# Patient Record
Sex: FEMALE | Race: WHITE
[De-identification: names, ages, dates, MRNs, and addresses within clinical notes are randomized per-mention and may not be internally consistent; named-entity substitution may affect disease eponyms.]

---

## 2017-07-21 ENCOUNTER — HOSPITAL ENCOUNTER (EMERGENCY)
Dept: HOSPITAL 8 - ED | Age: 42
Discharge: HOME | End: 2017-07-21
Payer: COMMERCIAL

## 2017-07-21 VITALS — SYSTOLIC BLOOD PRESSURE: 120 MMHG | DIASTOLIC BLOOD PRESSURE: 80 MMHG

## 2017-07-21 VITALS — BODY MASS INDEX: 30.47 KG/M2 | WEIGHT: 155.21 LBS | HEIGHT: 60 IN
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
## 2017-07-21 DIAGNOSIS — S33.5XXA: Primary | ICD-10-CM
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
## 2017-07-21 DIAGNOSIS — Z88.8: ICD-10-CM
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
## 2017-07-21 PROCEDURE — 72110 X-RAY EXAM L-2 SPINE 4/>VWS: CPT

## 2017-07-21 PROCEDURE — 99284 EMERGENCY DEPT VISIT MOD MDM: CPT

## 2017-07-21 PROCEDURE — 96372 THER/PROPH/DIAG INJ SC/IM: CPT

## 2017-07-21 PROCEDURE — 72072 X-RAY EXAM THORAC SPINE 3VWS: CPT

## 2017-12-12 ENCOUNTER — HOSPITAL ENCOUNTER (EMERGENCY)
Dept: HOSPITAL 8 - ED | Age: 42
Discharge: HOME | End: 2017-12-12
Payer: COMMERCIAL

## 2017-12-12 VITALS — DIASTOLIC BLOOD PRESSURE: 87 MMHG | SYSTOLIC BLOOD PRESSURE: 125 MMHG

## 2017-12-12 VITALS — BODY MASS INDEX: 25.22 KG/M2 | HEIGHT: 64 IN | WEIGHT: 147.71 LBS

## 2017-12-12 DIAGNOSIS — K21.9: Primary | ICD-10-CM

## 2017-12-12 LAB
BUN SERPL-MCNC: 12 MG/DL (ref 7–18)
HCT VFR BLD CALC: 43.4 % (ref 34.6–47.8)
HGB BLD-MCNC: 14.5 G/DL (ref 11.7–16.4)
WBC # BLD AUTO: 8.1 X10^3/UL (ref 3.4–10)

## 2017-12-12 PROCEDURE — 80048 BASIC METABOLIC PNL TOTAL CA: CPT

## 2017-12-12 PROCEDURE — 82040 ASSAY OF SERUM ALBUMIN: CPT

## 2017-12-12 PROCEDURE — 36415 COLL VENOUS BLD VENIPUNCTURE: CPT

## 2017-12-12 PROCEDURE — 87086 URINE CULTURE/COLONY COUNT: CPT

## 2017-12-12 PROCEDURE — 96376 TX/PRO/DX INJ SAME DRUG ADON: CPT

## 2017-12-12 PROCEDURE — 81001 URINALYSIS AUTO W/SCOPE: CPT

## 2017-12-12 PROCEDURE — 74176 CT ABD & PELVIS W/O CONTRAST: CPT

## 2017-12-12 PROCEDURE — 85025 COMPLETE CBC W/AUTO DIFF WBC: CPT

## 2017-12-12 PROCEDURE — 96374 THER/PROPH/DIAG INJ IV PUSH: CPT

## 2017-12-12 PROCEDURE — 96375 TX/PRO/DX INJ NEW DRUG ADDON: CPT

## 2017-12-12 PROCEDURE — 99285 EMERGENCY DEPT VISIT HI MDM: CPT

## 2017-12-12 RX ADMIN — MORPHINE SULFATE PRN MG: 4 INJECTION INTRAVENOUS at 16:53

## 2017-12-12 RX ADMIN — MORPHINE SULFATE PRN MG: 4 INJECTION INTRAVENOUS at 17:53

## 2020-01-18 ENCOUNTER — HOSPITAL ENCOUNTER (EMERGENCY)
Dept: HOSPITAL 8 - ED | Age: 45
Discharge: HOME | End: 2020-01-18
Payer: COMMERCIAL

## 2020-01-18 VITALS — BODY MASS INDEX: 28.98 KG/M2 | HEIGHT: 64 IN | WEIGHT: 169.76 LBS

## 2020-01-18 VITALS — SYSTOLIC BLOOD PRESSURE: 101 MMHG | DIASTOLIC BLOOD PRESSURE: 63 MMHG

## 2020-01-18 DIAGNOSIS — Z90.49: ICD-10-CM

## 2020-01-18 DIAGNOSIS — Z90.710: ICD-10-CM

## 2020-01-18 DIAGNOSIS — Z87.891: ICD-10-CM

## 2020-01-18 DIAGNOSIS — K21.9: ICD-10-CM

## 2020-01-18 DIAGNOSIS — G43.009: Primary | ICD-10-CM

## 2020-01-18 DIAGNOSIS — N10: ICD-10-CM

## 2020-01-18 LAB
ALBUMIN SERPL-MCNC: 4.4 G/DL (ref 3.4–5)
ALP SERPL-CCNC: 112 U/L (ref 45–117)
ALT SERPL-CCNC: 43 U/L (ref 12–78)
ANION GAP SERPL CALC-SCNC: 9 MMOL/L (ref 5–15)
BASOPHILS # BLD AUTO: 0.09 X10^3/UL (ref 0–0.1)
BASOPHILS NFR BLD AUTO: 1 % (ref 0–1)
BILIRUB SERPL-MCNC: 0.5 MG/DL (ref 0.2–1)
CALCIUM SERPL-MCNC: 9.3 MG/DL (ref 8.5–10.1)
CHLORIDE SERPL-SCNC: 113 MMOL/L (ref 98–107)
CREAT SERPL-MCNC: 1.19 MG/DL (ref 0.55–1.02)
CULTURE INDICATED?: YES
EOSINOPHIL # BLD AUTO: 0.1 X10^3/UL (ref 0–0.4)
EOSINOPHIL NFR BLD AUTO: 1 % (ref 1–7)
ERYTHROCYTE [DISTWIDTH] IN BLOOD BY AUTOMATED COUNT: 12.7 % (ref 9.6–15.2)
HCG UR SG: 1.02 (ref 1–1.03)
LYMPHOCYTES # BLD AUTO: 1.46 X10^3/UL (ref 1–3.4)
LYMPHOCYTES NFR BLD AUTO: 15 % (ref 22–44)
MCH RBC QN AUTO: 31.3 PG (ref 27–34.8)
MCHC RBC AUTO-ENTMCNC: 33.2 G/DL (ref 32.4–35.8)
MCV RBC AUTO: 94.2 FL (ref 80–100)
MD: NO
MICROSCOPIC: (no result)
MONOCYTES # BLD AUTO: 0.41 X10^3/UL (ref 0.2–0.8)
MONOCYTES NFR BLD AUTO: 4 % (ref 2–9)
NEUTROPHILS # BLD AUTO: 7.94 X10^3/UL (ref 1.8–6.8)
NEUTROPHILS NFR BLD AUTO: 80 % (ref 42–75)
PLATELET # BLD AUTO: 380 X10^3/UL (ref 130–400)
PMV BLD AUTO: 7.8 FL (ref 7.4–10.4)
PROT SERPL-MCNC: 7.6 G/DL (ref 6.4–8.2)
RBC # BLD AUTO: 4.52 X10^6/UL (ref 3.82–5.3)

## 2020-01-18 PROCEDURE — 81001 URINALYSIS AUTO W/SCOPE: CPT

## 2020-01-18 PROCEDURE — 96365 THER/PROPH/DIAG IV INF INIT: CPT

## 2020-01-18 PROCEDURE — 36415 COLL VENOUS BLD VENIPUNCTURE: CPT

## 2020-01-18 PROCEDURE — 96375 TX/PRO/DX INJ NEW DRUG ADDON: CPT

## 2020-01-18 PROCEDURE — 83690 ASSAY OF LIPASE: CPT

## 2020-01-18 PROCEDURE — 87086 URINE CULTURE/COLONY COUNT: CPT

## 2020-01-18 PROCEDURE — 80053 COMPREHEN METABOLIC PANEL: CPT

## 2020-01-18 PROCEDURE — 96361 HYDRATE IV INFUSION ADD-ON: CPT

## 2020-01-18 PROCEDURE — 96376 TX/PRO/DX INJ SAME DRUG ADON: CPT

## 2020-01-18 PROCEDURE — 85025 COMPLETE CBC W/AUTO DIFF WBC: CPT

## 2020-01-18 PROCEDURE — 81025 URINE PREGNANCY TEST: CPT

## 2020-01-18 PROCEDURE — 99283 EMERGENCY DEPT VISIT LOW MDM: CPT

## 2020-01-18 NOTE — NUR
ROCEPHIN ROSEMARY, INFUSING AT 100ML/HR VIA PUMP.  IV SITE PATENT.  PT LYING 
QUIETLY ON BED.  REPORTS SLIGHT IMPROVEMENT IN HA PAIN, BUT CONTINUING NAUSEA; 
REQUESTED ADDITIONAL ZOFRAN; ERP WILL BE CONSULTED.  SIDE RAILS UP X2, CALL 
LIGHT W/IN REACH, ROOM LIGHTS DIMMED. PT'S DAUGHTER IN ROOM.

## 2020-01-21 ENCOUNTER — HOSPITAL ENCOUNTER (EMERGENCY)
Dept: HOSPITAL 8 - ED | Age: 45
Discharge: HOME | End: 2020-01-21
Payer: COMMERCIAL

## 2020-01-21 VITALS — WEIGHT: 171.3 LBS | BODY MASS INDEX: 29.24 KG/M2 | HEIGHT: 64 IN

## 2020-01-21 VITALS — DIASTOLIC BLOOD PRESSURE: 80 MMHG | SYSTOLIC BLOOD PRESSURE: 118 MMHG

## 2020-01-21 DIAGNOSIS — Z90.710: ICD-10-CM

## 2020-01-21 DIAGNOSIS — Z90.49: ICD-10-CM

## 2020-01-21 DIAGNOSIS — R10.9: Primary | ICD-10-CM

## 2020-01-21 DIAGNOSIS — Z88.8: ICD-10-CM

## 2020-01-21 LAB
ALBUMIN SERPL-MCNC: 4.5 G/DL (ref 3.4–5)
ALP SERPL-CCNC: 118 U/L (ref 45–117)
ALT SERPL-CCNC: 39 U/L (ref 12–78)
ANION GAP SERPL CALC-SCNC: 7 MMOL/L (ref 5–15)
BASOPHILS # BLD AUTO: 0.07 X10^3/UL (ref 0–0.1)
BASOPHILS NFR BLD AUTO: 1 % (ref 0–1)
BILIRUB SERPL-MCNC: 0.5 MG/DL (ref 0.2–1)
CALCIUM SERPL-MCNC: 9.7 MG/DL (ref 8.5–10.1)
CHLORIDE SERPL-SCNC: 112 MMOL/L (ref 98–107)
CREAT SERPL-MCNC: 1.38 MG/DL (ref 0.55–1.02)
CULTURE INDICATED?: NO
EOSINOPHIL # BLD AUTO: 0.12 X10^3/UL (ref 0–0.4)
EOSINOPHIL NFR BLD AUTO: 2 % (ref 1–7)
ERYTHROCYTE [DISTWIDTH] IN BLOOD BY AUTOMATED COUNT: 12.7 % (ref 9.6–15.2)
LYMPHOCYTES # BLD AUTO: 1.7 X10^3/UL (ref 1–3.4)
LYMPHOCYTES NFR BLD AUTO: 29 % (ref 22–44)
MCH RBC QN AUTO: 31.6 PG (ref 27–34.8)
MCHC RBC AUTO-ENTMCNC: 33.4 G/DL (ref 32.4–35.8)
MCV RBC AUTO: 94.7 FL (ref 80–100)
MD: NO
MICROSCOPIC: (no result)
MONOCYTES # BLD AUTO: 0.3 X10^3/UL (ref 0.2–0.8)
MONOCYTES NFR BLD AUTO: 5 % (ref 2–9)
NEUTROPHILS # BLD AUTO: 3.61 X10^3/UL (ref 1.8–6.8)
NEUTROPHILS NFR BLD AUTO: 62 % (ref 42–75)
PLATELET # BLD AUTO: 384 X10^3/UL (ref 130–400)
PMV BLD AUTO: 7.4 FL (ref 7.4–10.4)
PROT SERPL-MCNC: 7.9 G/DL (ref 6.4–8.2)
RBC # BLD AUTO: 4.86 X10^6/UL (ref 3.82–5.3)

## 2020-01-21 PROCEDURE — 83605 ASSAY OF LACTIC ACID: CPT

## 2020-01-21 PROCEDURE — 99284 EMERGENCY DEPT VISIT MOD MDM: CPT

## 2020-01-21 PROCEDURE — 80053 COMPREHEN METABOLIC PANEL: CPT

## 2020-01-21 PROCEDURE — 85025 COMPLETE CBC W/AUTO DIFF WBC: CPT

## 2020-01-21 PROCEDURE — 87040 BLOOD CULTURE FOR BACTERIA: CPT

## 2020-01-21 PROCEDURE — 36415 COLL VENOUS BLD VENIPUNCTURE: CPT

## 2020-01-21 PROCEDURE — 96372 THER/PROPH/DIAG INJ SC/IM: CPT

## 2020-01-21 PROCEDURE — 81003 URINALYSIS AUTO W/O SCOPE: CPT

## 2020-01-21 PROCEDURE — 74176 CT ABD & PELVIS W/O CONTRAST: CPT

## 2020-01-21 NOTE — NUR
BREAK RN: THIS IS A 43 YO FEMALE WHO PRESENTS TO THE ER C/O RIGHT FLANK PAIN 
RADIATING DOWN RIGHT LEG WITH GENERAL BODY ACHES. PT REPORTS NAUSEA BUT DENIES 
VOMITING OR DIARRHEA. PT AO X 4. SKIN PWD. RESP EVEN AND UNLABORED. PT ON CONT 
BP AND O2 MONITORS. CALL LIGHT WITHIN REACH. WILL CONT TO MONITOR PT.

## 2020-03-18 ENCOUNTER — HOSPITAL ENCOUNTER (OUTPATIENT)
Dept: LAB | Facility: MEDICAL CENTER | Age: 45
End: 2020-03-18
Attending: FAMILY MEDICINE
Payer: COMMERCIAL

## 2020-03-18 LAB
25(OH)D3 SERPL-MCNC: 47 NG/ML (ref 30–100)
ALBUMIN SERPL BCP-MCNC: 4.5 G/DL (ref 3.2–4.9)
ALBUMIN/GLOB SERPL: 2 G/DL
ALP SERPL-CCNC: 91 U/L (ref 30–99)
ALT SERPL-CCNC: 40 U/L (ref 2–50)
ANION GAP SERPL CALC-SCNC: 9 MMOL/L (ref 7–16)
AST SERPL-CCNC: 26 U/L (ref 12–45)
BASOPHILS # BLD AUTO: 1 % (ref 0–1.8)
BASOPHILS # BLD: 0.05 K/UL (ref 0–0.12)
BILIRUB SERPL-MCNC: <0.2 MG/DL (ref 0.1–1.5)
BUN SERPL-MCNC: 16 MG/DL (ref 8–22)
CALCIUM SERPL-MCNC: 9.5 MG/DL (ref 8.5–10.5)
CHLORIDE SERPL-SCNC: 111 MMOL/L (ref 96–112)
CO2 SERPL-SCNC: 19 MMOL/L (ref 20–33)
CREAT SERPL-MCNC: 1 MG/DL (ref 0.5–1.4)
EOSINOPHIL # BLD AUTO: 0.08 K/UL (ref 0–0.51)
EOSINOPHIL NFR BLD: 1.5 % (ref 0–6.9)
ERYTHROCYTE [DISTWIDTH] IN BLOOD BY AUTOMATED COUNT: 44.2 FL (ref 35.9–50)
EST. AVERAGE GLUCOSE BLD GHB EST-MCNC: 103 MG/DL
ESTRADIOL SERPL-MCNC: 55.9 PG/ML
FASTING STATUS PATIENT QL REPORTED: NORMAL
GLOBULIN SER CALC-MCNC: 2.3 G/DL (ref 1.9–3.5)
GLUCOSE SERPL-MCNC: 97 MG/DL (ref 65–99)
HBA1C MFR BLD: 5.2 % (ref 0–5.6)
HCT VFR BLD AUTO: 40.9 % (ref 37–47)
HCYS SERPL-SCNC: 10.88 UMOL/L
HGB BLD-MCNC: 13.3 G/DL (ref 12–16)
IMM GRANULOCYTES # BLD AUTO: 0.02 K/UL (ref 0–0.11)
IMM GRANULOCYTES NFR BLD AUTO: 0.4 % (ref 0–0.9)
LYMPHOCYTES # BLD AUTO: 1.29 K/UL (ref 1–4.8)
LYMPHOCYTES NFR BLD: 24.9 % (ref 22–41)
MCH RBC QN AUTO: 32 PG (ref 27–33)
MCHC RBC AUTO-ENTMCNC: 32.5 G/DL (ref 33.6–35)
MCV RBC AUTO: 98.6 FL (ref 81.4–97.8)
MONOCYTES # BLD AUTO: 0.28 K/UL (ref 0–0.85)
MONOCYTES NFR BLD AUTO: 5.4 % (ref 0–13.4)
NEUTROPHILS # BLD AUTO: 3.47 K/UL (ref 2–7.15)
NEUTROPHILS NFR BLD: 66.8 % (ref 44–72)
NRBC # BLD AUTO: 0 K/UL
NRBC BLD-RTO: 0 /100 WBC
PLATELET # BLD AUTO: 335 K/UL (ref 164–446)
PMV BLD AUTO: 9.4 FL (ref 9–12.9)
POTASSIUM SERPL-SCNC: 3.9 MMOL/L (ref 3.6–5.5)
PROGEST SERPL-MCNC: 0.07 NG/ML
PROT SERPL-MCNC: 6.8 G/DL (ref 6–8.2)
RBC # BLD AUTO: 4.15 M/UL (ref 4.2–5.4)
SODIUM SERPL-SCNC: 139 MMOL/L (ref 135–145)
T3FREE SERPL-MCNC: 2.53 PG/ML (ref 2.4–4.2)
T4 FREE SERPL-MCNC: 0.88 NG/DL (ref 0.53–1.43)
THYROPEROXIDASE AB SERPL-ACNC: <9 IU/ML (ref 0–9)
TSH SERPL DL<=0.005 MIU/L-ACNC: 1.38 UIU/ML (ref 0.38–5.33)
WBC # BLD AUTO: 5.2 K/UL (ref 4.8–10.8)

## 2020-03-18 PROCEDURE — 83036 HEMOGLOBIN GLYCOSYLATED A1C: CPT

## 2020-03-18 PROCEDURE — 84403 ASSAY OF TOTAL TESTOSTERONE: CPT

## 2020-03-18 PROCEDURE — 82306 VITAMIN D 25 HYDROXY: CPT

## 2020-03-18 PROCEDURE — 36415 COLL VENOUS BLD VENIPUNCTURE: CPT

## 2020-03-18 PROCEDURE — 84402 ASSAY OF FREE TESTOSTERONE: CPT

## 2020-03-18 PROCEDURE — 84481 FREE ASSAY (FT-3): CPT

## 2020-03-18 PROCEDURE — 82670 ASSAY OF TOTAL ESTRADIOL: CPT

## 2020-03-18 PROCEDURE — 84144 ASSAY OF PROGESTERONE: CPT

## 2020-03-18 PROCEDURE — 84482 T3 REVERSE: CPT

## 2020-03-18 PROCEDURE — 84270 ASSAY OF SEX HORMONE GLOBUL: CPT

## 2020-03-18 PROCEDURE — 86376 MICROSOMAL ANTIBODY EACH: CPT

## 2020-03-18 PROCEDURE — 80053 COMPREHEN METABOLIC PANEL: CPT

## 2020-03-18 PROCEDURE — 82679 ASSAY OF ESTRONE: CPT

## 2020-03-18 PROCEDURE — 86800 THYROGLOBULIN ANTIBODY: CPT

## 2020-03-18 PROCEDURE — 85025 COMPLETE CBC W/AUTO DIFF WBC: CPT

## 2020-03-18 PROCEDURE — 84439 ASSAY OF FREE THYROXINE: CPT

## 2020-03-18 PROCEDURE — 83525 ASSAY OF INSULIN: CPT

## 2020-03-18 PROCEDURE — 83090 ASSAY OF HOMOCYSTEINE: CPT

## 2020-03-18 PROCEDURE — 84443 ASSAY THYROID STIM HORMONE: CPT

## 2020-03-20 LAB
ESTRONE SERPL-MCNC: 10.8 PG/ML
INSULIN P FAST SERPL-ACNC: 15 UIU/ML (ref 3–19)
THYROGLOB AB SERPL-ACNC: <0.9 IU/ML (ref 0–4)

## 2020-03-21 LAB
SHBG SERPL-SCNC: 20 NMOL/L (ref 30–135)
T3REVERSE SERPL-MCNC: 10.5 NG/DL (ref 9–27)
TESTOST FREE SERPL-MCNC: 26.4 PG/ML (ref 1.1–5.8)
TESTOST SERPL-MCNC: 122 NG/DL (ref 9–55)

## 2020-07-14 ENCOUNTER — APPOINTMENT (RX ONLY)
Dept: URBAN - METROPOLITAN AREA CLINIC 4 | Facility: CLINIC | Age: 45
Setting detail: DERMATOLOGY
End: 2020-07-14

## 2020-07-14 DIAGNOSIS — L81.4 OTHER MELANIN HYPERPIGMENTATION: ICD-10-CM

## 2020-07-14 DIAGNOSIS — L82.1 OTHER SEBORRHEIC KERATOSIS: ICD-10-CM

## 2020-07-14 DIAGNOSIS — D22 MELANOCYTIC NEVI: ICD-10-CM

## 2020-07-14 DIAGNOSIS — Q819 OTHER SPECIFIED ANOMALIES OF SKIN: ICD-10-CM

## 2020-07-14 DIAGNOSIS — Z71.89 OTHER SPECIFIED COUNSELING: ICD-10-CM

## 2020-07-14 DIAGNOSIS — Q826 OTHER SPECIFIED ANOMALIES OF SKIN: ICD-10-CM

## 2020-07-14 DIAGNOSIS — D18.0 HEMANGIOMA: ICD-10-CM

## 2020-07-14 DIAGNOSIS — Q828 OTHER SPECIFIED ANOMALIES OF SKIN: ICD-10-CM

## 2020-07-14 PROBLEM — D22.61 MELANOCYTIC NEVI OF RIGHT UPPER LIMB, INCLUDING SHOULDER: Status: ACTIVE | Noted: 2020-07-14

## 2020-07-14 PROBLEM — D22.5 MELANOCYTIC NEVI OF TRUNK: Status: ACTIVE | Noted: 2020-07-14

## 2020-07-14 PROBLEM — D22.62 MELANOCYTIC NEVI OF LEFT UPPER LIMB, INCLUDING SHOULDER: Status: ACTIVE | Noted: 2020-07-14

## 2020-07-14 PROBLEM — D18.01 HEMANGIOMA OF SKIN AND SUBCUTANEOUS TISSUE: Status: ACTIVE | Noted: 2020-07-14

## 2020-07-14 PROBLEM — D22.39 MELANOCYTIC NEVI OF OTHER PARTS OF FACE: Status: ACTIVE | Noted: 2020-07-14

## 2020-07-14 PROBLEM — L85.8 OTHER SPECIFIED EPIDERMAL THICKENING: Status: ACTIVE | Noted: 2020-07-14

## 2020-07-14 PROBLEM — D23.39 OTHER BENIGN NEOPLASM OF SKIN OF OTHER PARTS OF FACE: Status: ACTIVE | Noted: 2020-07-14

## 2020-07-14 PROCEDURE — ? DIAGNOSIS COMMENT

## 2020-07-14 PROCEDURE — 99203 OFFICE O/P NEW LOW 30 MIN: CPT

## 2020-07-14 PROCEDURE — ? OBSERVATION

## 2020-07-14 PROCEDURE — ? COUNSELING

## 2020-07-14 PROCEDURE — ? ADDITIONAL NOTES

## 2020-07-14 ASSESSMENT — LOCATION DETAILED DESCRIPTION DERM
LOCATION DETAILED: LEFT ANTERIOR PROXIMAL UPPER ARM
LOCATION DETAILED: RIGHT PROXIMAL POSTERIOR UPPER ARM
LOCATION DETAILED: RIGHT MID-UPPER BACK
LOCATION DETAILED: RIGHT SUPERIOR FOREHEAD
LOCATION DETAILED: LEFT PERIAREOLAR BREAST 12-1:00 REGION
LOCATION DETAILED: EPIGASTRIC SKIN
LOCATION DETAILED: SUPERIOR THORACIC SPINE
LOCATION DETAILED: RIGHT ANTERIOR PROXIMAL UPPER ARM
LOCATION DETAILED: LEFT PROXIMAL POSTERIOR UPPER ARM
LOCATION DETAILED: RIGHT VENTRAL DISTAL FOREARM
LOCATION DETAILED: LEFT MEDIAL UPPER BACK
LOCATION DETAILED: RIGHT ANTERIOR DISTAL UPPER ARM
LOCATION DETAILED: RIGHT ANTERIOR SHOULDER
LOCATION DETAILED: LOWER STERNUM
LOCATION DETAILED: LEFT SUPERIOR MEDIAL MIDBACK
LOCATION DETAILED: RIGHT LATERAL SUPERIOR CHEST
LOCATION DETAILED: LEFT INFERIOR MEDIAL FOREHEAD
LOCATION DETAILED: LEFT SUPERIOR FOREHEAD
LOCATION DETAILED: LEFT VENTRAL PROXIMAL FOREARM
LOCATION DETAILED: RIGHT PERIAREOLAR BREAST 11-12:00 REGION

## 2020-07-14 ASSESSMENT — LOCATION SIMPLE DESCRIPTION DERM
LOCATION SIMPLE: LEFT BREAST
LOCATION SIMPLE: LEFT UPPER ARM
LOCATION SIMPLE: RIGHT SHOULDER
LOCATION SIMPLE: LEFT UPPER BACK
LOCATION SIMPLE: RIGHT POSTERIOR UPPER ARM
LOCATION SIMPLE: LEFT POSTERIOR UPPER ARM
LOCATION SIMPLE: RIGHT UPPER ARM
LOCATION SIMPLE: CHEST
LOCATION SIMPLE: RIGHT BREAST
LOCATION SIMPLE: RIGHT UPPER BACK
LOCATION SIMPLE: UPPER BACK
LOCATION SIMPLE: LEFT LOWER BACK
LOCATION SIMPLE: RIGHT FOREARM
LOCATION SIMPLE: LEFT FOREARM
LOCATION SIMPLE: ABDOMEN
LOCATION SIMPLE: LEFT FOREHEAD
LOCATION SIMPLE: RIGHT FOREHEAD

## 2020-07-14 ASSESSMENT — LOCATION ZONE DERM
LOCATION ZONE: ARM
LOCATION ZONE: FACE
LOCATION ZONE: TRUNK

## 2020-07-14 NOTE — PROCEDURE: ADDITIONAL NOTES
Detail Level: Detailed
Additional Notes: Recommended CeraVe SA, amlactin that can help with the bumps.

## 2020-07-14 NOTE — PROCEDURE: DIAGNOSIS COMMENT
Comment: Lesions of concerns on intake clinically consistent with Seborrheic keratoses.
Detail Level: Detailed
Comment: Photos obtained with measurement, will monitor.

## 2020-09-04 ENCOUNTER — HOSPITAL ENCOUNTER (OUTPATIENT)
Dept: LAB | Facility: MEDICAL CENTER | Age: 45
End: 2020-09-04
Attending: FAMILY MEDICINE
Payer: COMMERCIAL

## 2020-09-04 LAB
25(OH)D3 SERPL-MCNC: 64 NG/ML (ref 30–100)
ALBUMIN SERPL BCP-MCNC: 5 G/DL (ref 3.2–4.9)
ALBUMIN/GLOB SERPL: 2.4 G/DL
ALP SERPL-CCNC: 102 U/L (ref 30–99)
ALT SERPL-CCNC: 22 U/L (ref 2–50)
ANION GAP SERPL CALC-SCNC: 11 MMOL/L (ref 7–16)
AST SERPL-CCNC: 18 U/L (ref 12–45)
BASOPHILS # BLD AUTO: 0.8 % (ref 0–1.8)
BASOPHILS # BLD: 0.06 K/UL (ref 0–0.12)
BILIRUB SERPL-MCNC: 0.3 MG/DL (ref 0.1–1.5)
BUN SERPL-MCNC: 19 MG/DL (ref 8–22)
CALCIUM SERPL-MCNC: 10.2 MG/DL (ref 8.5–10.5)
CHLORIDE SERPL-SCNC: 109 MMOL/L (ref 96–112)
CO2 SERPL-SCNC: 20 MMOL/L (ref 20–33)
CREAT SERPL-MCNC: 1.09 MG/DL (ref 0.5–1.4)
EOSINOPHIL # BLD AUTO: 0.09 K/UL (ref 0–0.51)
EOSINOPHIL NFR BLD: 1.3 % (ref 0–6.9)
ERYTHROCYTE [DISTWIDTH] IN BLOOD BY AUTOMATED COUNT: 44.2 FL (ref 35.9–50)
ESTRADIOL SERPL-MCNC: 51.7 PG/ML
FASTING STATUS PATIENT QL REPORTED: NORMAL
GLOBULIN SER CALC-MCNC: 2.1 G/DL (ref 1.9–3.5)
GLUCOSE SERPL-MCNC: 96 MG/DL (ref 65–99)
HCT VFR BLD AUTO: 46.2 % (ref 37–47)
HCYS SERPL-SCNC: 11.54 UMOL/L
HGB BLD-MCNC: 14.5 G/DL (ref 12–16)
IMM GRANULOCYTES # BLD AUTO: 0.02 K/UL (ref 0–0.11)
IMM GRANULOCYTES NFR BLD AUTO: 0.3 % (ref 0–0.9)
LYMPHOCYTES # BLD AUTO: 1.37 K/UL (ref 1–4.8)
LYMPHOCYTES NFR BLD: 19.2 % (ref 22–41)
MCH RBC QN AUTO: 31.5 PG (ref 27–33)
MCHC RBC AUTO-ENTMCNC: 31.4 G/DL (ref 33.6–35)
MCV RBC AUTO: 100.2 FL (ref 81.4–97.8)
MONOCYTES # BLD AUTO: 0.28 K/UL (ref 0–0.85)
MONOCYTES NFR BLD AUTO: 3.9 % (ref 0–13.4)
NEUTROPHILS # BLD AUTO: 5.33 K/UL (ref 2–7.15)
NEUTROPHILS NFR BLD: 74.5 % (ref 44–72)
NRBC # BLD AUTO: 0 K/UL
NRBC BLD-RTO: 0 /100 WBC
PLATELET # BLD AUTO: 332 K/UL (ref 164–446)
PMV BLD AUTO: 10.1 FL (ref 9–12.9)
POTASSIUM SERPL-SCNC: 3.7 MMOL/L (ref 3.6–5.5)
PROGEST SERPL-MCNC: 0.08 NG/ML
PROT SERPL-MCNC: 7.1 G/DL (ref 6–8.2)
RBC # BLD AUTO: 4.61 M/UL (ref 4.2–5.4)
SODIUM SERPL-SCNC: 140 MMOL/L (ref 135–145)
T3FREE SERPL-MCNC: 2.43 PG/ML (ref 2–4.4)
TSH SERPL DL<=0.005 MIU/L-ACNC: 0.5 UIU/ML (ref 0.38–5.33)
WBC # BLD AUTO: 7.2 K/UL (ref 4.8–10.8)

## 2020-09-04 PROCEDURE — 84144 ASSAY OF PROGESTERONE: CPT

## 2020-09-04 PROCEDURE — 83525 ASSAY OF INSULIN: CPT

## 2020-09-04 PROCEDURE — 84443 ASSAY THYROID STIM HORMONE: CPT

## 2020-09-04 PROCEDURE — 84402 ASSAY OF FREE TESTOSTERONE: CPT

## 2020-09-04 PROCEDURE — 83090 ASSAY OF HOMOCYSTEINE: CPT

## 2020-09-04 PROCEDURE — 80053 COMPREHEN METABOLIC PANEL: CPT

## 2020-09-04 PROCEDURE — 82306 VITAMIN D 25 HYDROXY: CPT

## 2020-09-04 PROCEDURE — 85025 COMPLETE CBC W/AUTO DIFF WBC: CPT

## 2020-09-04 PROCEDURE — 84481 FREE ASSAY (FT-3): CPT

## 2020-09-04 PROCEDURE — 84305 ASSAY OF SOMATOMEDIN: CPT

## 2020-09-04 PROCEDURE — 82679 ASSAY OF ESTRONE: CPT

## 2020-09-04 PROCEDURE — 84403 ASSAY OF TOTAL TESTOSTERONE: CPT

## 2020-09-04 PROCEDURE — 82670 ASSAY OF TOTAL ESTRADIOL: CPT

## 2020-09-04 PROCEDURE — 36415 COLL VENOUS BLD VENIPUNCTURE: CPT

## 2020-09-04 PROCEDURE — 84270 ASSAY OF SEX HORMONE GLOBUL: CPT

## 2020-09-06 LAB — INSULIN P FAST SERPL-ACNC: 12 UIU/ML (ref 3–19)

## 2020-09-07 LAB
ESTRONE SERPL-MCNC: 9.1 PG/ML
IGF-I SERPL-MCNC: 230 NG/ML (ref 62–243)
IGF-I Z-SCORE SERPL: 1.6

## 2020-09-08 LAB
SHBG SERPL-SCNC: 18 NMOL/L (ref 30–135)
TESTOST FREE SERPL-MCNC: 14.9 PG/ML (ref 1.1–5.8)
TESTOST SERPL-MCNC: 67 NG/DL (ref 9–55)

## 2020-09-18 ENCOUNTER — APPOINTMENT (OUTPATIENT)
Dept: LAB | Facility: MEDICAL CENTER | Age: 45
End: 2020-09-18
Attending: FAMILY MEDICINE
Payer: COMMERCIAL

## 2020-09-19 ENCOUNTER — HOSPITAL ENCOUNTER (OUTPATIENT)
Facility: MEDICAL CENTER | Age: 45
End: 2020-09-19
Attending: FAMILY MEDICINE
Payer: COMMERCIAL

## 2020-09-19 PROCEDURE — 87046 STOOL CULTR AEROBIC BACT EA: CPT

## 2020-09-19 PROCEDURE — 87045 FECES CULTURE AEROBIC BACT: CPT

## 2020-09-19 PROCEDURE — 87899 AGENT NOS ASSAY W/OPTIC: CPT

## 2020-09-21 ENCOUNTER — HOSPITAL ENCOUNTER (OUTPATIENT)
Dept: LAB | Facility: MEDICAL CENTER | Age: 45
End: 2020-09-21
Attending: FAMILY MEDICINE
Payer: COMMERCIAL

## 2020-09-23 LAB
E COLI SXT1+2 STL IA: NORMAL
SIGNIFICANT IND 70042: NORMAL
SITE SITE: NORMAL
SOURCE SOURCE: NORMAL

## 2020-09-25 LAB
BACTERIA STL CULT: NORMAL
E COLI SXT1+2 STL IA: NORMAL
SIGNIFICANT IND 70042: NORMAL
SITE SITE: NORMAL
SOURCE SOURCE: NORMAL

## 2020-10-09 ENCOUNTER — HOSPITAL ENCOUNTER (OUTPATIENT)
Dept: LAB | Facility: MEDICAL CENTER | Age: 45
End: 2020-10-09
Attending: FAMILY MEDICINE
Payer: COMMERCIAL

## 2020-10-09 PROCEDURE — 91065 BREATH HYDROGEN/METHANE TEST: CPT

## 2020-10-25 LAB — TEST NAME 95000: NORMAL

## 2020-11-02 DIAGNOSIS — N13.30 HYDRONEPHROSIS, UNSPECIFIED HYDRONEPHROSIS TYPE: ICD-10-CM

## 2020-11-02 DIAGNOSIS — R93.429 ECHOGENIC KIDNEYS ON RENAL ULTRASOUND: ICD-10-CM

## 2020-11-02 DIAGNOSIS — Z87.442 HISTORY OF KIDNEY STONES: ICD-10-CM

## 2020-11-04 ENCOUNTER — HOSPITAL ENCOUNTER (OUTPATIENT)
Dept: LAB | Facility: MEDICAL CENTER | Age: 45
End: 2020-11-04
Attending: INTERNAL MEDICINE
Payer: COMMERCIAL

## 2020-11-04 ENCOUNTER — HOSPITAL ENCOUNTER (OUTPATIENT)
Dept: RADIOLOGY | Facility: MEDICAL CENTER | Age: 45
End: 2020-11-04
Attending: INTERNAL MEDICINE
Payer: COMMERCIAL

## 2020-11-04 DIAGNOSIS — R93.429 ECHOGENIC KIDNEYS ON RENAL ULTRASOUND: ICD-10-CM

## 2020-11-04 DIAGNOSIS — Z87.442 HISTORY OF KIDNEY STONES: ICD-10-CM

## 2020-11-04 DIAGNOSIS — N13.30 HYDRONEPHROSIS, UNSPECIFIED HYDRONEPHROSIS TYPE: ICD-10-CM

## 2020-11-04 LAB
ALBUMIN SERPL BCP-MCNC: 4.3 G/DL (ref 3.2–4.9)
ANION GAP SERPL CALC-SCNC: 13 MMOL/L (ref 7–16)
APPEARANCE UR: CLEAR
BACTERIA #/AREA URNS HPF: ABNORMAL /HPF
BILIRUB UR QL STRIP.AUTO: NEGATIVE
BUN SERPL-MCNC: 17 MG/DL (ref 8–22)
CALCIUM SERPL-MCNC: 9.9 MG/DL (ref 8.4–10.2)
CHLORIDE SERPL-SCNC: 109 MMOL/L (ref 96–112)
CO2 SERPL-SCNC: 18 MMOL/L (ref 20–33)
COLOR UR: YELLOW
CREAT SERPL-MCNC: 0.89 MG/DL (ref 0.5–1.4)
CREAT UR-MCNC: 17.36 MG/DL
EPI CELLS #/AREA URNS HPF: ABNORMAL /HPF
ERYTHROCYTE [DISTWIDTH] IN BLOOD BY AUTOMATED COUNT: 42.9 FL (ref 35.9–50)
FASTING STATUS PATIENT QL REPORTED: NORMAL
GLUCOSE SERPL-MCNC: 76 MG/DL (ref 65–99)
GLUCOSE UR STRIP.AUTO-MCNC: NEGATIVE MG/DL
HCT VFR BLD AUTO: 40.5 % (ref 37–47)
HGB BLD-MCNC: 13.8 G/DL (ref 12–16)
HYALINE CASTS #/AREA URNS LPF: ABNORMAL /LPF
KETONES UR STRIP.AUTO-MCNC: ABNORMAL MG/DL
LEUKOCYTE ESTERASE UR QL STRIP.AUTO: ABNORMAL
MCH RBC QN AUTO: 31.9 PG (ref 27–33)
MCHC RBC AUTO-ENTMCNC: 34.1 G/DL (ref 33.6–35)
MCV RBC AUTO: 93.5 FL (ref 81.4–97.8)
MICRO URNS: ABNORMAL
MICROALBUMIN UR-MCNC: <1.2 MG/DL
MICROALBUMIN/CREAT UR: NORMAL MG/G (ref 0–30)
NITRITE UR QL STRIP.AUTO: NEGATIVE
PH UR STRIP.AUTO: 6 [PH] (ref 5–8)
PHOSPHATE SERPL-MCNC: 3.4 MG/DL (ref 2.5–4.5)
PLATELET # BLD AUTO: 343 K/UL (ref 164–446)
PMV BLD AUTO: 9.7 FL (ref 9–12.9)
POTASSIUM SERPL-SCNC: 3.9 MMOL/L (ref 3.6–5.5)
PROT UR QL STRIP: NEGATIVE MG/DL
RBC # BLD AUTO: 4.33 M/UL (ref 4.2–5.4)
RBC # URNS HPF: ABNORMAL /HPF
RBC UR QL AUTO: NEGATIVE
SODIUM SERPL-SCNC: 140 MMOL/L (ref 135–145)
SP GR UR STRIP.AUTO: <=1.005
URATE SERPL-MCNC: 4.8 MG/DL (ref 1.9–8.2)
WBC # BLD AUTO: 6.4 K/UL (ref 4.8–10.8)
WBC #/AREA URNS HPF: ABNORMAL /HPF

## 2020-11-04 PROCEDURE — 85027 COMPLETE CBC AUTOMATED: CPT

## 2020-11-04 PROCEDURE — 74176 CT ABD & PELVIS W/O CONTRAST: CPT

## 2020-11-04 PROCEDURE — 36415 COLL VENOUS BLD VENIPUNCTURE: CPT

## 2020-11-04 PROCEDURE — 82570 ASSAY OF URINE CREATININE: CPT

## 2020-11-04 PROCEDURE — 82306 VITAMIN D 25 HYDROXY: CPT

## 2020-11-04 PROCEDURE — 84100 ASSAY OF PHOSPHORUS: CPT

## 2020-11-04 PROCEDURE — 82043 UR ALBUMIN QUANTITATIVE: CPT

## 2020-11-04 PROCEDURE — 83970 ASSAY OF PARATHORMONE: CPT

## 2020-11-04 PROCEDURE — 84550 ASSAY OF BLOOD/URIC ACID: CPT

## 2020-11-04 PROCEDURE — 81001 URINALYSIS AUTO W/SCOPE: CPT

## 2020-11-04 PROCEDURE — 82040 ASSAY OF SERUM ALBUMIN: CPT

## 2020-11-04 PROCEDURE — 80048 BASIC METABOLIC PNL TOTAL CA: CPT

## 2020-11-04 PROCEDURE — 84156 ASSAY OF PROTEIN URINE: CPT

## 2020-11-05 LAB
25(OH)D3 SERPL-MCNC: 74 NG/ML (ref 30–100)
CREAT UR-MCNC: 18.26 MG/DL
PROT UR-MCNC: <4 MG/DL (ref 0–15)
PROT/CREAT UR: NORMAL MG/G (ref 10–107)
PTH-INTACT SERPL-MCNC: 46.3 PG/ML (ref 14–72)

## 2020-11-06 ENCOUNTER — TELEPHONE (OUTPATIENT)
Dept: NEPHROLOGY | Facility: MEDICAL CENTER | Age: 45
End: 2020-11-06

## 2020-11-07 NOTE — TELEPHONE ENCOUNTER
Spoke with patient on the phone. She has had multiple kidney stones in the past requiring surgery / laser lithotripsy, and ureteral stents in the past. She doesn't know what kind of stones she has but she was given magnesium citrate pills in the past.     Will discuss further kidney stone history in clinic. Will likely also refer to urology.     Trung Schwab MD  Nephrology

## 2020-11-11 ENCOUNTER — OFFICE VISIT (OUTPATIENT)
Dept: NEPHROLOGY | Facility: MEDICAL CENTER | Age: 45
End: 2020-11-11
Payer: COMMERCIAL

## 2020-11-11 VITALS
HEART RATE: 78 BPM | SYSTOLIC BLOOD PRESSURE: 106 MMHG | RESPIRATION RATE: 16 BRPM | BODY MASS INDEX: 26.29 KG/M2 | HEIGHT: 64 IN | OXYGEN SATURATION: 98 % | DIASTOLIC BLOOD PRESSURE: 68 MMHG | TEMPERATURE: 98 F | WEIGHT: 154 LBS

## 2020-11-11 DIAGNOSIS — N20.0 NEPHROLITHIASIS: ICD-10-CM

## 2020-11-11 DIAGNOSIS — N25.1 DIABETES INSIPIDUS, NEPHROGENIC (HCC): ICD-10-CM

## 2020-11-11 DIAGNOSIS — N18.2 CKD (CHRONIC KIDNEY DISEASE) STAGE 2, GFR 60-89 ML/MIN: ICD-10-CM

## 2020-11-11 PROBLEM — F31.9 BIPOLAR DISORDER (HCC): Status: ACTIVE | Noted: 2020-11-11

## 2020-11-11 PROBLEM — E03.9 HYPOTHYROIDISM: Status: ACTIVE | Noted: 2020-11-11

## 2020-11-11 PROBLEM — G89.4 CHRONIC PAIN SYNDROME: Status: ACTIVE | Noted: 2020-11-11

## 2020-11-11 PROBLEM — Z85.41 HISTORY OF MALIGNANT NEOPLASM OF CERVIX: Status: ACTIVE | Noted: 2020-11-11

## 2020-11-11 PROBLEM — E66.9 OBESITY: Status: ACTIVE | Noted: 2020-11-11

## 2020-11-11 PROBLEM — R32 URINARY INCONTINENCE: Status: ACTIVE | Noted: 2020-11-11

## 2020-11-11 PROBLEM — G40.909 SEIZURE DISORDER (HCC): Status: ACTIVE | Noted: 2020-11-11

## 2020-11-11 PROBLEM — G43.909 MIGRAINE: Status: ACTIVE | Noted: 2020-11-11

## 2020-11-11 PROBLEM — K76.0 NONALCOHOLIC FATTY LIVER DISEASE: Status: ACTIVE | Noted: 2020-11-11

## 2020-11-11 PROCEDURE — 99204 OFFICE O/P NEW MOD 45 MIN: CPT | Performed by: INTERNAL MEDICINE

## 2020-11-11 RX ORDER — LAMOTRIGINE 100 MG/1
100 TABLET ORAL 2 TIMES DAILY
COMMUNITY
End: 2022-11-15

## 2020-11-11 RX ORDER — ALPRAZOLAM 0.25 MG/1
0.25 TABLET ORAL
COMMUNITY
End: 2020-11-11

## 2020-11-11 RX ORDER — LITHIUM CARBONATE 300 MG
3 TABLET ORAL DAILY
COMMUNITY
End: 2021-05-07

## 2020-11-11 RX ORDER — CIPROFLOXACIN 500 MG/1
500 TABLET, FILM COATED ORAL
COMMUNITY
End: 2020-11-11

## 2020-11-11 RX ORDER — CHOLECALCIFEROL (VITAMIN D3) 125 MCG
CAPSULE ORAL
COMMUNITY
End: 2022-05-10

## 2020-11-11 RX ORDER — CEPHALEXIN 500 MG/1
500 CAPSULE ORAL
COMMUNITY
End: 2020-11-11

## 2020-11-11 RX ORDER — CYCLOBENZAPRINE HCL 10 MG
10 TABLET ORAL
COMMUNITY
End: 2023-07-31

## 2020-11-11 RX ORDER — BUTALBITAL, ACETAMINOPHEN AND CAFFEINE 50; 325; 40 MG/1; MG/1; MG/1
50-325 TABLET ORAL
COMMUNITY
End: 2020-11-11

## 2020-11-11 RX ORDER — DICYCLOMINE HCL 20 MG
20 TABLET ORAL
COMMUNITY
End: 2024-02-23

## 2020-11-11 RX ORDER — DOXEPIN HYDROCHLORIDE 10 MG/1
10 CAPSULE ORAL
COMMUNITY
End: 2020-12-08

## 2020-11-11 RX ORDER — ALBUTEROL SULFATE 90 UG/1
90 AEROSOL, METERED RESPIRATORY (INHALATION)
COMMUNITY
End: 2024-02-23

## 2020-11-11 RX ORDER — POTASSIUM CITRATE 15 MEQ/1
15 TABLET, EXTENDED RELEASE ORAL
COMMUNITY
End: 2020-11-11

## 2020-11-11 RX ORDER — PSEUDOEPHEDRINE HCL 30 MG
100 TABLET ORAL
COMMUNITY
End: 2020-11-11

## 2020-11-11 RX ORDER — LEVOTHYROXINE SODIUM 0.07 MG/1
75 TABLET ORAL
COMMUNITY
Start: 2020-10-08 | End: 2020-11-11

## 2020-11-11 RX ORDER — SUMATRIPTAN 50 MG/1
50 TABLET, FILM COATED ORAL
COMMUNITY
End: 2023-02-06

## 2020-11-11 RX ORDER — TOPIRAMATE 50 MG/1
50 TABLET, FILM COATED ORAL 2 TIMES DAILY
COMMUNITY
End: 2021-10-26

## 2020-11-11 RX ORDER — VENLAFAXINE HYDROCHLORIDE 37.5 MG/1
37.5 CAPSULE, EXTENDED RELEASE ORAL
COMMUNITY
End: 2020-11-11

## 2020-11-11 RX ORDER — ESZOPICLONE 3 MG/1
3 TABLET, FILM COATED ORAL
COMMUNITY
End: 2020-11-11

## 2020-11-11 RX ORDER — DICYCLOMINE HYDROCHLORIDE 10 MG/1
10 CAPSULE ORAL
COMMUNITY
End: 2020-11-11

## 2020-11-11 RX ORDER — LEVETIRACETAM 1000 MG/1
1000 TABLET ORAL
COMMUNITY
End: 2020-11-11

## 2020-11-11 RX ORDER — ALPRAZOLAM 0.5 MG/1
0.5 TABLET ORAL
COMMUNITY
End: 2023-02-10

## 2020-11-11 RX ORDER — ARMODAFINIL 250 MG/1
250 TABLET ORAL
COMMUNITY
End: 2020-11-11

## 2020-11-11 RX ORDER — AZITHROMYCIN 250 MG/1
250 TABLET, FILM COATED ORAL
COMMUNITY
End: 2020-11-11

## 2020-11-11 RX ORDER — BENZONATATE 100 MG/1
100 CAPSULE ORAL
COMMUNITY
End: 2023-02-06

## 2020-11-11 RX ORDER — LORATADINE 10 MG/1
10 TABLET ORAL
COMMUNITY
End: 2021-07-23

## 2020-11-11 RX ORDER — BUTALBITAL AND ACETAMINOPHEN 50; 300 MG/1; MG/1
300 CAPSULE ORAL
COMMUNITY
End: 2020-11-11

## 2020-11-11 RX ORDER — CIPROFLOXACIN HYDROCHLORIDE 3.5 MG/ML
0.3 SOLUTION/ DROPS TOPICAL
COMMUNITY
End: 2020-11-11

## 2020-11-11 RX ORDER — FLUTICASONE PROPIONATE 50 MCG
SPRAY, SUSPENSION (ML) NASAL
COMMUNITY
End: 2021-07-23

## 2020-11-11 RX ORDER — HYDROMORPHONE HYDROCHLORIDE 4 MG/1
TABLET ORAL
COMMUNITY
End: 2023-07-31

## 2020-11-11 RX ORDER — VENLAFAXINE HYDROCHLORIDE 75 MG/1
75 CAPSULE, EXTENDED RELEASE ORAL
COMMUNITY
End: 2020-11-11

## 2020-11-11 RX ORDER — 7-OXODEHYDROEPIANDROSTERONE,MC 100 %
POWDER (GRAM) MISCELLANEOUS
COMMUNITY
End: 2022-05-13

## 2020-11-11 RX ORDER — LIOTHYRONINE SODIUM 5 UG/1
0.01 TABLET ORAL
COMMUNITY
End: 2023-07-31

## 2020-11-11 RX ORDER — PHENAZOPYRIDINE HYDROCHLORIDE 100 MG/1
100 TABLET, FILM COATED ORAL
COMMUNITY
End: 2020-11-11

## 2020-11-11 RX ORDER — GABAPENTIN 300 MG/1
300 CAPSULE ORAL
COMMUNITY
End: 2020-11-11

## 2020-11-11 RX ORDER — LIDOCAINE 50 MG/G
5 PATCH TOPICAL
COMMUNITY
End: 2023-02-06

## 2020-11-11 RX ORDER — ESZOPICLONE 3 MG/1
3 TABLET, FILM COATED ORAL
COMMUNITY
Start: 2020-10-15 | End: 2022-08-12 | Stop reason: SDUPTHER

## 2020-11-11 RX ORDER — OXYCODONE HYDROCHLORIDE AND ACETAMINOPHEN 5; 325 MG/1; MG/1
5 TABLET ORAL
COMMUNITY
End: 2020-11-11

## 2020-11-11 RX ORDER — BUTALBITAL, ACETAMINOPHEN, CAFFEINE AND CODEINE PHOSPHATE 50; 325; 40; 30 MG/1; MG/1; MG/1; MG/1
40 CAPSULE ORAL
COMMUNITY
Start: 2020-10-15 | End: 2023-02-06

## 2020-11-11 RX ORDER — OXYBUTYNIN CHLORIDE 15 MG/1
15 TABLET, EXTENDED RELEASE ORAL
COMMUNITY
End: 2020-11-11

## 2020-11-11 RX ORDER — CLOTRIMAZOLE 10 MG/1
10 LOZENGE ORAL; TOPICAL
COMMUNITY
End: 2020-11-11

## 2020-11-11 RX ORDER — OXYBUTYNIN CHLORIDE 10 MG/1
10 TABLET, EXTENDED RELEASE ORAL
COMMUNITY
End: 2020-11-11

## 2020-11-11 RX ORDER — ONDANSETRON 4 MG/1
4 TABLET, ORALLY DISINTEGRATING ORAL
COMMUNITY
End: 2024-02-23 | Stop reason: SDUPTHER

## 2020-11-11 RX ORDER — LEVOTHYROXINE SODIUM 0.07 MG/1
75 TABLET ORAL
COMMUNITY

## 2020-11-11 RX ORDER — INHALER, ASSIST DEVICES
SPACER (EA) MISCELLANEOUS
COMMUNITY
End: 2023-07-31

## 2020-11-11 RX ORDER — OXYCODONE AND ACETAMINOPHEN 10; 325 MG/1; MG/1
10-325 TABLET ORAL
COMMUNITY
End: 2020-11-11

## 2020-11-11 ASSESSMENT — ENCOUNTER SYMPTOMS
ABDOMINAL PAIN: 1
SHORTNESS OF BREATH: 0
FEVER: 0

## 2020-11-11 ASSESSMENT — FIBROSIS 4 INDEX: FIB4 SCORE: 0.53

## 2020-11-11 NOTE — PROGRESS NOTES
Chief Complaint   Patient presents with   • New Patient       Requesting Provider: Edmond Thomas D.*    HPI:  Earnestine Lindsay is a 45 y.o. female with a history of nephrolithiasis, bipolar II disorder who presents today to establish nephrology care.     Re: nephrolithiasis, She has had more kidney stones than she can count. Her first kidney stone episode was mid-20's. She has had stones multiple times a year since her 20's. Stones have been so severe in the past that she has needed laser lithotripsy. She drinks at least 120 oz of water overnight. She complains of constant thirst. She drinks lemon pureed with water and a little bit of stevia. She has had 24-hour collections in the past, and was told she was low on citrate. She's not sure if she had lots of calcium in the urine. She has never had sestamibi scan.     Re: Bipolar disorder, she has been on lithium therapy since late 20's. Kidney stones came before lithium therapy.         Past Medical History:   Diagnosis Date   • Anxiety    • Bipolar 2 disorder (HCC)    • Bipolar disorder (HCC) 11/11/2020   • Hypothyroidism 11/11/2020   • Nephrolithiasis 11/11/2020       Past Surgical History:   Procedure Laterality Date   • CHOLECYSTECTOMY  2009   • ABDOMINAL HYSTERECTOMY TOTAL  2002   • LITHOTRIPSY Right 2014 and 2015    kidney stone   • LYSIS ADHESIONS GENERAL     • OOPHORECTOMY          Outpatient Encounter Medications as of 11/11/2020   Medication Sig Dispense Refill   • albuterol 108 (90 Base) MCG/ACT Aero Soln inhalation aerosol 90 Puffs.     • ALPRAZolam (XANAX) 0.5 MG Tab 0.5 mg.     • benzonatate (TESSALON) 100 MG Cap 100 mg.     • butalbital-acetaminophen-caffeine-codeine (FIORICET W/CODEINE) -04-30 MG per capsule 40 Caps.     • cyclobenzaprine (FLEXERIL) 10 mg Tab 10 mg.     • dicyclomine (BENTYL) 20 MG Tab 20 mg.     • Eszopiclone 3 MG Tab 3 mg.     • levothyroxine (SYNTHROID) 75 MCG Tab 75 mcg.     • lidocaine (LIDODERM) 5 % Patch 5 Patches.      • liothyronine (CYTOMEL) 5 MCG Tab 0.005 mcg.     • loratadine (CLARITIN) 10 MG Tab 10 mg.     • ondansetron (ZOFRAN ODT) 4 MG TABLET DISPERSIBLE 4 mg.     • Multiple Vitamins-Minerals (MULTIVITAMIN ADULT PO) Take  by mouth.     • Cholecalciferol (VITAMIN D) 125 MCG (5000 UT) Cap Take  by mouth.     • Dextrose, Diabetic Use, (GLUCOSE) 1 g Chew Tab Take  by mouth.     • 7-Keto DHEA Powder by Does not apply route.     • doxepin (SINEQUAN) 10 MG Cap 10 mg.     • topiramate (TOPAMAX) 50 MG tablet Take 50 mg by mouth 2 Times a Day.     • SUMAtriptan (IMITREX) 50 MG Tab 50 mg.     • lithium (ESKALITH) 300 MG Tab Take 3 Tabs by mouth every day. 1 tab in AM, 2 tabs in PM     • TESTOSTERONE AQUEOUS IM      • Spacer/Aero-Holding Chambers (AEROCHAMBER MAX W/FLOW-VU) Misc Aerochamber Plus Flow-Vu     • fluticasone (FLONASE) 50 MCG/ACT nasal spray fluticasone propionate 50 mcg/actuation nasal spray,suspension     • HYDROmorphone (DILAUDID) 4 MG Tab hydromorphone 4 mg tablet     • lamotrigine (LAMICTAL) 200 MG tablet Take 1 Tab by mouth 2 Times a Day.     • progesterone (PROMETRIUM) 100 MG Cap Take 1 Cap by mouth every day.     • [DISCONTINUED] ALPRAZolam (XANAX) 0.25 MG Tab 0.25 mg.     • [DISCONTINUED] Armodafinil 250 MG Tab 250 mg.     • [DISCONTINUED] azithromycin (ZITHROMAX) 250 MG Tab 250 mg.     • [DISCONTINUED] Butalbital-Acetaminophen  MG Cap 300 mg.     • [DISCONTINUED] butalbital/apap/caffeine -40 mg (FIORICET) -40 MG Tab  Tabs.     • [DISCONTINUED] cephALEXin (KEFLEX) 500 MG Cap 500 mg.     • [DISCONTINUED] ciprofloxacin (CIPRO) 500 MG Tab 500 mg.     • [DISCONTINUED] ciprofloxacin (CILOXIN) 0.3 % Solution 0.3 Drops.     • [DISCONTINUED] clotrimazole (MYCELEX) 10 MG Ct ct 10 mg.     • [DISCONTINUED] dicyclomine (BENTYL) 10 MG Cap 10 mg.     • [DISCONTINUED] docusate sodium 100 MG Cap 100 mg.     • [DISCONTINUED] Eszopiclone 3 MG Tab 3 mg.     • [DISCONTINUED] gabapentin (NEURONTIN)  300 MG Cap 300 mg.     • [DISCONTINUED] levetiracetam (KEPPRA) 1000 MG tablet 1,000 mg.     • [DISCONTINUED] levothyroxine (SYNTHROID) 75 MCG Tab 75 mcg.     • [DISCONTINUED] onabotulinum toxin type A (BOTOX) 100 UNIT Recon Soln 100 Units.     • [DISCONTINUED] oxybutynin SR (DITROPAN-XL) 10 MG CR tablet 10 mg.     • [DISCONTINUED] oxybutynin (DITROPAN XL) 15 MG CR tablet 15 mg.     • [DISCONTINUED] oxyCODONE-acetaminophen (PERCOCET-10)  MG Tab  Tabs.     • [DISCONTINUED] oxyCODONE-acetaminophen (PERCOCET) 5-325 MG Tab 5 Tabs.     • [DISCONTINUED] phenazopyridine (PYRIDIUM) 100 MG Tab 100 mg.     • [DISCONTINUED] Potassium Citrate 15 MEQ (1620 MG) Tab CR 15 mg.     • [DISCONTINUED] venlafaxine XR (EFFEXOR XR) 37.5 MG CAPSULE SR 24 HR 37.5 mg.     • [DISCONTINUED] venlafaxine XR (EFFEXOR XR) 75 MG CAPSULE SR 24 HR 75 mg.       No facility-administered encounter medications on file as of 11/11/2020.         Allergies   Allergen Reactions   • Promethazine Hives       Social History     Socioeconomic History   • Marital status: Single     Spouse name: Not on file   • Number of children: Not on file   • Years of education: Not on file   • Highest education level: Not on file   Occupational History   • Not on file   Social Needs   • Financial resource strain: Not on file   • Food insecurity     Worry: Not on file     Inability: Not on file   • Transportation needs     Medical: Not on file     Non-medical: Not on file   Tobacco Use   • Smoking status: Former Smoker     Packs/day: 2.00     Years: 13.00     Pack years: 26.00     Types: Cigarettes   • Smokeless tobacco: Never Used   Substance and Sexual Activity   • Alcohol use: Not Currently     Comment: Used to drink, stopped comlpetely 2010's   • Drug use: Yes     Types: Marijuana   • Sexual activity: Not on file   Lifestyle   • Physical activity     Days per week: Not on file     Minutes per session: Not on file   • Stress: Not on file   Relationships   •  "Social connections     Talks on phone: Not on file     Gets together: Not on file     Attends Holiness service: Not on file     Active member of club or organization: Not on file     Attends meetings of clubs or organizations: Not on file     Relationship status: Not on file   • Intimate partner violence     Fear of current or ex partner: Not on file     Emotionally abused: Not on file     Physically abused: Not on file     Forced sexual activity: Not on file   Other Topics Concern   • Not on file   Social History Narrative    Works as        Family History   Problem Relation Age of Onset   • Kidney stones Brother    • Kidney Disease Neg Hx        Review of Systems   Constitutional: Negative for fever.   Respiratory: Negative for shortness of breath.    Cardiovascular: Negative for chest pain.   Gastrointestinal: Positive for abdominal pain (right back and burning across both sides).   Genitourinary: Positive for dysuria.   All other systems reviewed and are negative.      /68 (BP Location: Right arm, Patient Position: Sitting)   Pulse 78   Temp 36.7 °C (98 °F)   Resp 16   Ht 1.626 m (5' 4\")   Wt 69.9 kg (154 lb)   SpO2 98%   BMI 26.43 kg/m²     Physical Exam   Constitutional: She is oriented to person, place, and time and well-developed, well-nourished, and in no distress. No distress.   HENT:   Mouth/Throat: Oropharynx is clear and moist. No oropharyngeal exudate.   Eyes: EOM are normal. No scleral icterus.   Neck: Neck supple. No tracheal deviation present.   Cardiovascular: Normal rate, regular rhythm and normal heart sounds.   No murmur heard.  Pulmonary/Chest: Effort normal and breath sounds normal. No stridor. She has no rales.   Abdominal: Soft. Bowel sounds are normal. There is no abdominal tenderness.   Musculoskeletal: Normal range of motion.         General: No edema.   Neurological: She is alert and oriented to person, place, and time.   Skin: Skin is warm and dry. No rash " noted.   Psychiatric: Mood and affect normal.         Labs reviewed.    Recent Labs     03/18/20  0855 09/04/20  0755 11/04/20  1611   ALBUMIN 4.5 5.0* 4.3   SODIUM 139 140 140   POTASSIUM 3.9 3.7 3.9   CHLORIDE 111 109 109   CO2 19* 20 18*   BUN 16 19 17   CREATININE 1.00 1.09 0.89   PHOSPHORUS  --   --  3.4       Lab Results   Component Value Date/Time    WBC 6.4 11/04/2020 04:11 PM    RBC 4.33 11/04/2020 04:11 PM    HEMOGLOBIN 13.8 11/04/2020 04:11 PM    HEMATOCRIT 40.5 11/04/2020 04:11 PM    MCV 93.5 11/04/2020 04:11 PM    MCH 31.9 11/04/2020 04:11 PM    MCHC 34.1 11/04/2020 04:11 PM    MPV 9.7 11/04/2020 04:11 PM           URINALYSIS:  Lab Results   Component Value Date/Time    COLORURINE Yellow 11/04/2020 1611    CLARITY Clear 11/04/2020 1611    SPECGRAVITY <=1.005 11/04/2020 1611    PHURINE 6.0 11/04/2020 1611    KETONES Trace (A) 11/04/2020 1611    PROTEINURIN Negative 11/04/2020 1611    BILIRUBINUR Negative 11/04/2020 1611    NITRITE Negative 11/04/2020 1611    LEUKESTERAS Small (A) 11/04/2020 1611    OCCULTBLOOD Negative 11/04/2020 1611     UPC  Lab Results   Component Value Date/Time    TOTPROTUR <4.0 11/04/2020 1611      Lab Results   Component Value Date/Time    CREATININEU 18.26 11/04/2020 1611         Imaging reviewed  No orders to display         Assessment:  Earnestine Lindsay is a 45 y.o. female with a history of nephrolithiasis, bipolar II disorder who presents today to establish nephrology care.    Plan:  1. Nephrolithiasis  -Unclear etiology of recurrent nephrolithiasis.  Patient does not know if she has ever had any kidney stones analyzed for chemistry composition.  Recommend 24-hour urine collection.  Patient does have hypercalciuria, would consider sestamibi scan to evaluate for parathyroid adenoma.  Recommend drinking just water instead of lemon water during 24-hour collection.  Okay to continue lithium and topiramate while obtaining 24-hour collection.  If patient does have high urine  calcium, would consider hydrochlorothiazide 25 mg p.o. twice daily to decrease calciuria.    2. CKD (chronic kidney disease) stage 2, GFR 60-89 ml/min  -Patient hovers between CKD stage II and IIIa.  CKD likely due to mild interstitial disease from long-term lithium exposure.  I advised the patient to avoid NSAIDs and other nephrotoxins.    3.  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  Patient well compensated and able to respond to thirst by drinking plenty of water.  If patient is ever hospitalized, she will likely need copious water repletion if she does not have free access to water herself.        RTC 4 months with preclinic labs    Trung Schwab MD  Nephrology

## 2020-12-08 ENCOUNTER — PRE-ADMISSION TESTING (OUTPATIENT)
Dept: ADMISSIONS | Facility: MEDICAL CENTER | Age: 45
End: 2020-12-08
Attending: UROLOGY
Payer: COMMERCIAL

## 2020-12-08 DIAGNOSIS — Z01.812 PRE-OPERATIVE LABORATORY EXAMINATION: ICD-10-CM

## 2020-12-08 LAB
ANION GAP SERPL CALC-SCNC: 10 MMOL/L (ref 7–16)
BASOPHILS # BLD AUTO: 0.8 % (ref 0–1.8)
BASOPHILS # BLD: 0.06 K/UL (ref 0–0.12)
BUN SERPL-MCNC: 11 MG/DL (ref 8–22)
CALCIUM SERPL-MCNC: 10.3 MG/DL (ref 8.5–10.5)
CHLORIDE SERPL-SCNC: 107 MMOL/L (ref 96–112)
CO2 SERPL-SCNC: 23 MMOL/L (ref 20–33)
COVID ORDER STATUS COVID19: NORMAL
CREAT SERPL-MCNC: 0.95 MG/DL (ref 0.5–1.4)
EOSINOPHIL # BLD AUTO: 0.1 K/UL (ref 0–0.51)
EOSINOPHIL NFR BLD: 1.4 % (ref 0–6.9)
ERYTHROCYTE [DISTWIDTH] IN BLOOD BY AUTOMATED COUNT: 46.3 FL (ref 35.9–50)
GLUCOSE SERPL-MCNC: 85 MG/DL (ref 65–99)
HCT VFR BLD AUTO: 44.5 % (ref 37–47)
HGB BLD-MCNC: 14.3 G/DL (ref 12–16)
IMM GRANULOCYTES # BLD AUTO: 0.02 K/UL (ref 0–0.11)
IMM GRANULOCYTES NFR BLD AUTO: 0.3 % (ref 0–0.9)
LYMPHOCYTES # BLD AUTO: 1.99 K/UL (ref 1–4.8)
LYMPHOCYTES NFR BLD: 27.3 % (ref 22–41)
MCH RBC QN AUTO: 32.4 PG (ref 27–33)
MCHC RBC AUTO-ENTMCNC: 32.1 G/DL (ref 33.6–35)
MCV RBC AUTO: 100.7 FL (ref 81.4–97.8)
MONOCYTES # BLD AUTO: 0.37 K/UL (ref 0–0.85)
MONOCYTES NFR BLD AUTO: 5.1 % (ref 0–13.4)
NEUTROPHILS # BLD AUTO: 4.75 K/UL (ref 2–7.15)
NEUTROPHILS NFR BLD: 65.1 % (ref 44–72)
NRBC # BLD AUTO: 0 K/UL
NRBC BLD-RTO: 0 /100 WBC
PLATELET # BLD AUTO: 344 K/UL (ref 164–446)
PMV BLD AUTO: 10.2 FL (ref 9–12.9)
POTASSIUM SERPL-SCNC: 4.1 MMOL/L (ref 3.6–5.5)
RBC # BLD AUTO: 4.42 M/UL (ref 4.2–5.4)
SODIUM SERPL-SCNC: 140 MMOL/L (ref 135–145)
WBC # BLD AUTO: 7.3 K/UL (ref 4.8–10.8)

## 2020-12-08 PROCEDURE — C9803 HOPD COVID-19 SPEC COLLECT: HCPCS

## 2020-12-08 PROCEDURE — U0003 INFECTIOUS AGENT DETECTION BY NUCLEIC ACID (DNA OR RNA); SEVERE ACUTE RESPIRATORY SYNDROME CORONAVIRUS 2 (SARS-COV-2) (CORONAVIRUS DISEASE [COVID-19]), AMPLIFIED PROBE TECHNIQUE, MAKING USE OF HIGH THROUGHPUT TECHNOLOGIES AS DESCRIBED BY CMS-2020-01-R: HCPCS

## 2020-12-08 PROCEDURE — 85025 COMPLETE CBC W/AUTO DIFF WBC: CPT

## 2020-12-08 PROCEDURE — 36415 COLL VENOUS BLD VENIPUNCTURE: CPT

## 2020-12-08 PROCEDURE — 80048 BASIC METABOLIC PNL TOTAL CA: CPT

## 2020-12-08 RX ORDER — TOPIRAMATE 100 MG/1
TABLET, FILM COATED ORAL
COMMUNITY
Start: 2020-11-15 | End: 2021-10-26

## 2020-12-08 ASSESSMENT — FIBROSIS 4 INDEX: FIB4 SCORE: 0.53

## 2020-12-09 ENCOUNTER — ANESTHESIA (OUTPATIENT)
Dept: SURGERY | Facility: MEDICAL CENTER | Age: 45
End: 2020-12-09
Payer: COMMERCIAL

## 2020-12-09 ENCOUNTER — HOSPITAL ENCOUNTER (OUTPATIENT)
Facility: MEDICAL CENTER | Age: 45
End: 2020-12-09
Attending: UROLOGY | Admitting: UROLOGY
Payer: COMMERCIAL

## 2020-12-09 ENCOUNTER — ANESTHESIA EVENT (OUTPATIENT)
Dept: SURGERY | Facility: MEDICAL CENTER | Age: 45
End: 2020-12-09
Payer: COMMERCIAL

## 2020-12-09 ENCOUNTER — APPOINTMENT (OUTPATIENT)
Dept: RADIOLOGY | Facility: MEDICAL CENTER | Age: 45
End: 2020-12-09
Attending: UROLOGY
Payer: COMMERCIAL

## 2020-12-09 VITALS
WEIGHT: 155.42 LBS | OXYGEN SATURATION: 96 % | BODY MASS INDEX: 26.53 KG/M2 | DIASTOLIC BLOOD PRESSURE: 72 MMHG | TEMPERATURE: 98 F | HEIGHT: 64 IN | RESPIRATION RATE: 16 BRPM | HEART RATE: 69 BPM | SYSTOLIC BLOOD PRESSURE: 106 MMHG

## 2020-12-09 DIAGNOSIS — G89.18 POSTOPERATIVE PAIN: ICD-10-CM

## 2020-12-09 LAB
SARS-COV-2 RNA RESP QL NAA+PROBE: NOTDETECTED
SPECIMEN SOURCE: NORMAL

## 2020-12-09 PROCEDURE — 700111 HCHG RX REV CODE 636 W/ 250 OVERRIDE (IP): Performed by: ANESTHESIOLOGY

## 2020-12-09 PROCEDURE — 160025 RECOVERY II MINUTES (STATS): Performed by: UROLOGY

## 2020-12-09 PROCEDURE — 160039 HCHG SURGERY MINUTES - EA ADDL 1 MIN LEVEL 3: Performed by: UROLOGY

## 2020-12-09 PROCEDURE — 160046 HCHG PACU - 1ST 60 MINS PHASE II: Performed by: UROLOGY

## 2020-12-09 PROCEDURE — 160028 HCHG SURGERY MINUTES - 1ST 30 MINS LEVEL 3: Performed by: UROLOGY

## 2020-12-09 PROCEDURE — C1758 CATHETER, URETERAL: HCPCS | Performed by: UROLOGY

## 2020-12-09 PROCEDURE — 160048 HCHG OR STATISTICAL LEVEL 1-5: Performed by: UROLOGY

## 2020-12-09 PROCEDURE — 160002 HCHG RECOVERY MINUTES (STAT): Performed by: UROLOGY

## 2020-12-09 PROCEDURE — 160036 HCHG PACU - EA ADDL 30 MINS PHASE I: Performed by: UROLOGY

## 2020-12-09 PROCEDURE — 160035 HCHG PACU - 1ST 60 MINS PHASE I: Performed by: UROLOGY

## 2020-12-09 PROCEDURE — 700102 HCHG RX REV CODE 250 W/ 637 OVERRIDE(OP): Performed by: ANESTHESIOLOGY

## 2020-12-09 PROCEDURE — C2617 STENT, NON-COR, TEM W/O DEL: HCPCS | Performed by: UROLOGY

## 2020-12-09 PROCEDURE — 700102 HCHG RX REV CODE 250 W/ 637 OVERRIDE(OP): Performed by: UROLOGY

## 2020-12-09 PROCEDURE — 500879 HCHG PACK, CYSTO: Performed by: UROLOGY

## 2020-12-09 PROCEDURE — C1769 GUIDE WIRE: HCPCS | Performed by: UROLOGY

## 2020-12-09 PROCEDURE — A9270 NON-COVERED ITEM OR SERVICE: HCPCS | Performed by: ANESTHESIOLOGY

## 2020-12-09 PROCEDURE — 160009 HCHG ANES TIME/MIN: Performed by: UROLOGY

## 2020-12-09 PROCEDURE — 700101 HCHG RX REV CODE 250: Performed by: ANESTHESIOLOGY

## 2020-12-09 PROCEDURE — A9270 NON-COVERED ITEM OR SERVICE: HCPCS | Performed by: UROLOGY

## 2020-12-09 PROCEDURE — 501329 HCHG SET, CYSTO IRRIG Y TUR: Performed by: UROLOGY

## 2020-12-09 PROCEDURE — 700117 HCHG RX CONTRAST REV CODE 255: Performed by: UROLOGY

## 2020-12-09 PROCEDURE — 700105 HCHG RX REV CODE 258: Performed by: UROLOGY

## 2020-12-09 DEVICE — STENT UROLOGICAL POLARIS 6X24  ULTRA: Type: IMPLANTABLE DEVICE | Status: FUNCTIONAL

## 2020-12-09 RX ORDER — OXYCODONE HYDROCHLORIDE AND ACETAMINOPHEN 5; 325 MG/1; MG/1
1 TABLET ORAL EVERY 6 HOURS PRN
Qty: 9 TAB | Refills: 0 | Status: SHIPPED | OUTPATIENT
Start: 2020-12-09 | End: 2020-12-12

## 2020-12-09 RX ORDER — ONDANSETRON 2 MG/ML
INJECTION INTRAMUSCULAR; INTRAVENOUS PRN
Status: DISCONTINUED | OUTPATIENT
Start: 2020-12-09 | End: 2020-12-09 | Stop reason: SURG

## 2020-12-09 RX ORDER — OXYCODONE HCL 5 MG/5 ML
5 SOLUTION, ORAL ORAL
Status: COMPLETED | OUTPATIENT
Start: 2020-12-09 | End: 2020-12-09

## 2020-12-09 RX ORDER — DIPHENHYDRAMINE HYDROCHLORIDE 50 MG/ML
12.5 INJECTION INTRAMUSCULAR; INTRAVENOUS
Status: DISCONTINUED | OUTPATIENT
Start: 2020-12-09 | End: 2020-12-09 | Stop reason: HOSPADM

## 2020-12-09 RX ORDER — HYDROMORPHONE HYDROCHLORIDE 1 MG/ML
0.2 INJECTION, SOLUTION INTRAMUSCULAR; INTRAVENOUS; SUBCUTANEOUS
Status: DISCONTINUED | OUTPATIENT
Start: 2020-12-09 | End: 2020-12-09 | Stop reason: HOSPADM

## 2020-12-09 RX ORDER — KETOROLAC TROMETHAMINE 30 MG/ML
INJECTION, SOLUTION INTRAMUSCULAR; INTRAVENOUS PRN
Status: DISCONTINUED | OUTPATIENT
Start: 2020-12-09 | End: 2020-12-09 | Stop reason: SURG

## 2020-12-09 RX ORDER — CEFAZOLIN SODIUM 1 G/3ML
INJECTION, POWDER, FOR SOLUTION INTRAMUSCULAR; INTRAVENOUS PRN
Status: DISCONTINUED | OUTPATIENT
Start: 2020-12-09 | End: 2020-12-09 | Stop reason: SURG

## 2020-12-09 RX ORDER — IODIXANOL 270 MG/ML
INJECTION, SOLUTION INTRAVASCULAR
Status: DISCONTINUED | OUTPATIENT
Start: 2020-12-09 | End: 2020-12-09 | Stop reason: HOSPADM

## 2020-12-09 RX ORDER — SODIUM CHLORIDE, SODIUM LACTATE, POTASSIUM CHLORIDE, CALCIUM CHLORIDE 600; 310; 30; 20 MG/100ML; MG/100ML; MG/100ML; MG/100ML
INJECTION, SOLUTION INTRAVENOUS CONTINUOUS
Status: DISCONTINUED | OUTPATIENT
Start: 2020-12-09 | End: 2020-12-09 | Stop reason: HOSPADM

## 2020-12-09 RX ORDER — HALOPERIDOL 5 MG/ML
1 INJECTION INTRAMUSCULAR
Status: DISCONTINUED | OUTPATIENT
Start: 2020-12-09 | End: 2020-12-09 | Stop reason: HOSPADM

## 2020-12-09 RX ORDER — OXYCODONE HCL 5 MG/5 ML
10 SOLUTION, ORAL ORAL
Status: COMPLETED | OUTPATIENT
Start: 2020-12-09 | End: 2020-12-09

## 2020-12-09 RX ORDER — DEXAMETHASONE SODIUM PHOSPHATE 4 MG/ML
INJECTION, SOLUTION INTRA-ARTICULAR; INTRALESIONAL; INTRAMUSCULAR; INTRAVENOUS; SOFT TISSUE PRN
Status: DISCONTINUED | OUTPATIENT
Start: 2020-12-09 | End: 2020-12-09 | Stop reason: SURG

## 2020-12-09 RX ORDER — LIDOCAINE HYDROCHLORIDE 20 MG/ML
INJECTION, SOLUTION EPIDURAL; INFILTRATION; INTRACAUDAL; PERINEURAL PRN
Status: DISCONTINUED | OUTPATIENT
Start: 2020-12-09 | End: 2020-12-09 | Stop reason: SURG

## 2020-12-09 RX ORDER — MIDAZOLAM HYDROCHLORIDE 1 MG/ML
INJECTION INTRAMUSCULAR; INTRAVENOUS PRN
Status: DISCONTINUED | OUTPATIENT
Start: 2020-12-09 | End: 2020-12-09 | Stop reason: SURG

## 2020-12-09 RX ORDER — ONDANSETRON 2 MG/ML
4 INJECTION INTRAMUSCULAR; INTRAVENOUS
Status: COMPLETED | OUTPATIENT
Start: 2020-12-09 | End: 2020-12-09

## 2020-12-09 RX ORDER — HYDROMORPHONE HYDROCHLORIDE 1 MG/ML
0.1 INJECTION, SOLUTION INTRAMUSCULAR; INTRAVENOUS; SUBCUTANEOUS
Status: DISCONTINUED | OUTPATIENT
Start: 2020-12-09 | End: 2020-12-09 | Stop reason: HOSPADM

## 2020-12-09 RX ORDER — HYDROMORPHONE HYDROCHLORIDE 1 MG/ML
0.4 INJECTION, SOLUTION INTRAMUSCULAR; INTRAVENOUS; SUBCUTANEOUS
Status: DISCONTINUED | OUTPATIENT
Start: 2020-12-09 | End: 2020-12-09 | Stop reason: HOSPADM

## 2020-12-09 RX ORDER — LORAZEPAM 2 MG/ML
0.5 INJECTION INTRAMUSCULAR
Status: DISCONTINUED | OUTPATIENT
Start: 2020-12-09 | End: 2020-12-09 | Stop reason: HOSPADM

## 2020-12-09 RX ADMIN — LIDOCAINE HYDROCHLORIDE 50 MG: 20 INJECTION, SOLUTION EPIDURAL; INFILTRATION; INTRACAUDAL at 15:21

## 2020-12-09 RX ADMIN — FENTANYL CITRATE 50 MCG: 50 INJECTION, SOLUTION INTRAMUSCULAR; INTRAVENOUS at 17:21

## 2020-12-09 RX ADMIN — FENTANYL CITRATE 50 MCG: 50 INJECTION, SOLUTION INTRAMUSCULAR; INTRAVENOUS at 16:16

## 2020-12-09 RX ADMIN — CEFAZOLIN 2 G: 330 INJECTION, POWDER, FOR SOLUTION INTRAMUSCULAR; INTRAVENOUS at 15:21

## 2020-12-09 RX ADMIN — HYDROMORPHONE HYDROCHLORIDE 0.4 MG: 1 INJECTION, SOLUTION INTRAMUSCULAR; INTRAVENOUS; SUBCUTANEOUS at 16:43

## 2020-12-09 RX ADMIN — FENTANYL CITRATE 50 MCG: 50 INJECTION, SOLUTION INTRAMUSCULAR; INTRAVENOUS at 17:28

## 2020-12-09 RX ADMIN — OXYCODONE HYDROCHLORIDE 10 MG: 5 SOLUTION ORAL at 16:14

## 2020-12-09 RX ADMIN — MIDAZOLAM HYDROCHLORIDE 2 MG: 1 INJECTION, SOLUTION INTRAMUSCULAR; INTRAVENOUS at 15:18

## 2020-12-09 RX ADMIN — HYDROMORPHONE HYDROCHLORIDE 0.2 MG: 1 INJECTION, SOLUTION INTRAMUSCULAR; INTRAVENOUS; SUBCUTANEOUS at 17:00

## 2020-12-09 RX ADMIN — FENTANYL CITRATE 150 MCG: 50 INJECTION, SOLUTION INTRAMUSCULAR; INTRAVENOUS at 15:21

## 2020-12-09 RX ADMIN — PROPOFOL 150 MG: 10 INJECTION, EMULSION INTRAVENOUS at 15:21

## 2020-12-09 RX ADMIN — ONDANSETRON 4 MG: 2 INJECTION INTRAMUSCULAR; INTRAVENOUS at 15:21

## 2020-12-09 RX ADMIN — FENTANYL CITRATE 50 MCG: 50 INJECTION, SOLUTION INTRAMUSCULAR; INTRAVENOUS at 16:29

## 2020-12-09 RX ADMIN — POVIDONE IODINE 15 ML: 100 SOLUTION TOPICAL at 15:12

## 2020-12-09 RX ADMIN — SODIUM CHLORIDE, POTASSIUM CHLORIDE, SODIUM LACTATE AND CALCIUM CHLORIDE: 600; 310; 30; 20 INJECTION, SOLUTION INTRAVENOUS at 15:13

## 2020-12-09 RX ADMIN — DEXAMETHASONE SODIUM PHOSPHATE 4 MG: 4 INJECTION, SOLUTION INTRA-ARTICULAR; INTRALESIONAL; INTRAMUSCULAR; INTRAVENOUS; SOFT TISSUE at 15:21

## 2020-12-09 RX ADMIN — ONDANSETRON 4 MG: 2 INJECTION INTRAMUSCULAR; INTRAVENOUS at 18:17

## 2020-12-09 RX ADMIN — KETOROLAC TROMETHAMINE 30 MG: 30 INJECTION, SOLUTION INTRAMUSCULAR at 15:44

## 2020-12-09 RX ADMIN — HYDROMORPHONE HYDROCHLORIDE 0.4 MG: 1 INJECTION, SOLUTION INTRAMUSCULAR; INTRAVENOUS; SUBCUTANEOUS at 17:05

## 2020-12-09 ASSESSMENT — PAIN DESCRIPTION - PAIN TYPE
TYPE: ACUTE PAIN;SURGICAL PAIN

## 2020-12-09 NOTE — ANESTHESIA QCDR
2019 [facility] Clinical Data Registry (for Quality Improvement)     Postoperative nausea/vomiting risk protocol (Adult = 18 yrs and Pediatric 3-17 yrs)- (430 and 463)  General inhalation anesthetic (NOT TIVA) with PONV risk factors: Yes  Provision of anti-emetic therapy with at least 2 different classes of agents: Yes   Patient DID NOT receive anti-emetic therapy and reason is documented in Medical Record:  N/A    Multimodal Pain Management- (477)  Non-emergent surgery AND patient age >= 18: Yes  Use of Multimodal Pain Management, two or more drugs and/or interventions, NOT including systemic opioids: Yes  Exception: Documented allergy to multiple classes of analgesics: N/A    Smoking Abstinence (404)  Patient is current smoker (cigarette, pipe, e-cig, marijuanna): No  Elective Surgery:   Abstinence instructions provided prior to day of surgery:   Patient abstained from smoking on day of surgery:     Pre-Op Beta-Blocker in Isolated CABG (44)  Isolated CABG AND patient age >= 18:   Beta-blocker admin within 24 hours of surgical incision:   Exception:of medical reason(s) for not administering beta blocker within 24 hours prior to surgical incision (e.g., not  indicated,other medical reason):     PACU assessment of acute postoperative pain prior to Anesthesia Care End- Applies to Patients Age = 18- (ABG7)  Initial PACU pain score is which of the following: < 7/10  Patient unable to report pain score: N/A    Post-anesthetic transfer of care checklist/protocol to PACU/ICU- (426 and 427)  Upon conclusion of case, patient transferred to which of the following locations: PACU/Non-ICU  Use of transfer checklist/protocol: Yes  Exclusion: Service Performed in Patient Hospital Room (and thus did not require transfer): N/A  Unplanned admission to ICU related to anesthesia service up through end of PACU care- (MD51)  Unplanned admission to ICU (not initially anticipated at anesthesia start time): No

## 2020-12-09 NOTE — OR SURGEON
Immediate Post OP Note    PreOp Diagnosis: right hydro    PostOp Diagnosis: same, likely UPJO    Procedure(s):  CYSTOSCOPY, WITH URETERAL STENT INSERTION - Wound Class: Clean Contaminated  RETROGRADE  URETEROSCOPY - Wound Class: Clean Contaminated    Surgeon(s):  Dorian Estrada M.D.    Anesthesiologist/Type of Anesthesia:  Anesthesiologist: Parveen Cortes M.D./General    Surgical Staff:  Circulator: Sridhar Archer R.N.  Laser Staff: Audi Cordero  Scrub Person: Brianna Torres  Radiology Technologist: Earnestine Olivarez    Specimens removed if any:  * No specimens in log *    Estimated Blood Loss: none    Findings: obstructed UPJ with redundant fold; no stone.  24x6 stent    Complications: none        12/9/2020 3:51 PM Dorian Estrada M.D.

## 2020-12-09 NOTE — ANESTHESIA PREPROCEDURE EVALUATION
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    Pertinent diagnostic labs and testing reviewed    Informed Consent:    Anesthetic plan and risks discussed with patient.    Use of blood products discussed with: patient whom consented to blood products.

## 2020-12-09 NOTE — ANESTHESIA TIME REPORT
Anesthesia Start and Stop Event Times     Date Time Event    12/9/2020 1509 Ready for Procedure     1518 Anesthesia Start     1553 Anesthesia Stop        Responsible Staff  12/09/20    Name Role Begin End    Parveen Cortes M.D. Anesth 1518 1553        Preop Diagnosis (Free Text):  Pre-op Diagnosis     RIGHT HYDRONEPHROSIS        Preop Diagnosis (Codes):    Post op Diagnosis  Hydronephrosis      Premium Reason  B. 1st Call    Comments:

## 2020-12-09 NOTE — ANESTHESIA PROCEDURE NOTES
Airway    Date/Time: 12/9/2020 3:22 PM  Performed by: Parveen Cortes M.D.  Authorized by: Parveen Cortes M.D.     Location:  OR  Urgency:  Elective  Indications for Airway Management:  Anesthesia      Spontaneous Ventilation: absent    Sedation Level:  Deep  Preoxygenated: Yes    Mask Difficulty Assessment:  0 - not attempted  Final Airway Type:  Supraglottic airway  Final Supraglottic Airway:  Standard LMA    SGA Size:  4  Number of Attempts at Approach:  1

## 2020-12-10 NOTE — OP REPORT
DATE OF SERVICE:  12/09/2020     NAME OF OPERATION:   1.  Right retrograde pyelogram.  2.  Right ureteroscopy.  3.  Right ureteral stent placement.     PREOPERATIVE DIAGNOSES:    1.  Right hydronephrosis.  2.  Possible right ureteropelvic junction stone.     POSTOPERATIVE DIAGNOSES:    1.  Right hydronephrosis.  2.  Likely right ureteropelvic junction obstruction.     PRIMARY SURGEON:  Dorian Estrada MD     ANESTHESIOLOGIST:  Parveen Cortes MD     FINDINGS:  Normal appearing right ureter.  A redundant fold at the right   ureteropelvic junction, likely causing functional obstruction.  Hydronephrosis   noted within the right kidney.  No stone intrarenally identified.  A 24 cm x   6-Faroese ureteral stent.     INDICATIONS:  Briefly, the patient is a 45-year-old woman with recurrent   severe right right-sided flank pain.  A CT scan this demonstrated mild right   hydronephrosis.  There is a calcification in the vicinity of her right   ureteropelvic junction.  This raised the suspicion for obstructing stone   versus ureteropelvic junction obstruction.  Informed consent has been obtained   for further evaluation ureteroscopically.  Informed consent was obtained.     DESCRIPTION OF PROCEDURE:  Patient was taken to the operating room and placed   on the operating table in supine position.  After administration of general   anesthetic, she was placed in lithotomy.  Genitals were prepped and draped   sterilely.  A 21 Faroese cystoscope was passed in the bladder.  Bladder was   within normal limits.  Right ureteral orifice was identified and a 6-Faroese   open-ended ureteral catheter was placed in the mid distal ureter.    Approximately 8 mL of half strength contrast was injected retrograde.  There   is normal course and caliber of the ureter and what appeared to be an open   ureteropelvic junction.     A wire was then passed up to the ureter under fluoroscopic guidance.  A   digital flexible disposable ureteroscope was then  passed easily over the wire   to the level of the proximal ureter.  The wire was removed.  An inspection of   the proximal ureter demonstrated that there was an acute change of direction   creating a flap-like deformity that I was able to negotiate with the scope.    There was no intrinsic scar.  This was suggestive of a redundant fold of the   proximal ureter or at the junction itself.  In the kidney, there was mild   hydronephrosis observed.  Each jumana was inspected for evidence of a stone.    There was no stone found.     The scope was then slowly withdrawn across the ureteropelvic junction.  Again,   there was no intrinsic scar identified.  That stated, there was what appeared   to be a fold over that would certainly be a source of obstruction.    Additional retrograde pyelogram was taken through the scope at this level,   which demonstrated this quality fairly well.  The entire length of the ureter   was inspected as the scope was removed.  There was no evidence of ureteral   stone or ureteral injury.     A wire was then passed cystoscopically up to the right kidney.  A 24 cm x   6-Panamanian ureteral stent was placed.  Its proximal J and was seen curling in   the right renal pelvis and its distal J in the bladder.  There was excellent   efflux of slightly bloody material from the stent, indicating proper   positioning.  The bladder was drained, the case concluded.  She tolerated the   procedure well and was taken to recovery room in stable condition.        ______________________________________________  Dorian Estrada MD [facility]    DD:  12/09/2020 15:58  DT:  12/09/2020 16:53    Job#:  [id number]

## 2020-12-10 NOTE — OR NURSING
Pt recovered well. Had some pain in right flank area, managed to tolerable level with medication. Pt denies nausea, tolerating sips of water. Pt is A&OX4, VSS, no incision, no belongings in PACU. Daughter was called and updated on pt status and plan for d/c shortly. 1 script on chart, d/c order placed. Pt currently resting in bed in no acute distress, able to make needs known.

## 2020-12-10 NOTE — DISCHARGE INSTRUCTIONS
ACTIVITY: Rest and take it easy for the first 24 hours.  A responsible adult is recommended to remain with you during that time.  It is normal to feel sleepy.  We encourage you to not do anything that requires balance, judgment or coordination.    MILD FLU-LIKE SYMPTOMS ARE NORMAL. YOU MAY EXPERIENCE GENERALIZED MUSCLE ACHES, THROAT IRRITATION, HEADACHE AND/OR SOME NAUSEA.    FOR 24 HOURS DO NOT:  Drive, operate machinery or run household appliances.  Drink beer or alcoholic beverages.   Make important decisions or sign legal documents.    SPECIAL INSTRUCTIONS:   It is common to have blood in your urine following this procedure.    A ureteral stent was placed during your procedure.    Dr. Estrada's office will call you to arrange follow up.    DIET: To avoid nausea, slowly advance diet as tolerated, avoiding spicy or greasy foods for the first day.  Add more substantial food to your diet according to your physician's instructions.  Babies can be fed formula or breast milk as soon as they are hungry.  INCREASE FLUIDS AND FIBER TO AVOID CONSTIPATION.    SURGICAL DRESSING/BATHING: You may shower as you normally do.    FOLLOW-UP APPOINTMENT:  A follow-up appointment should be arranged with your doctor; call to schedule.    You should CALL YOUR PHYSICIAN if you develop:  Fever greater than 101 degrees F.  Pain not relieved by medication, or persistent nausea or vomiting.  Excessive bleeding (blood soaking through dressing) or unexpected drainage from the wound.  Extreme redness or swelling around the incision site, drainage of pus or foul smelling drainage.  Inability to urinate or empty your bladder within 8 hours.  Problems with breathing or chest pain.    You should call 911 if you develop problems with breathing or chest pain.  If you are unable to contact your doctor or surgical center, you should go to the nearest emergency room or urgent care center.      Physician's telephone #: [phone]   Sean    If any questions arise, call your doctor.  If your doctor is not available, please feel free to call the Surgical Center at [phone]. The Contact Center is open Monday through Friday 7AM to 5PM and may speak to a nurse at [phone], or toll free at [phone].     A registered nurse may call you a few days after your surgery to see how you are doing after your procedure.    MEDICATIONS: Resume taking daily medication.  Take prescribed pain medication with food.  If no medication is prescribed, you may take non-aspirin pain medication if needed.  PAIN MEDICATION CAN BE VERY CONSTIPATING.  Take a stool softener or laxative such as senokot, pericolace, or milk of magnesia if needed.    Prescription given for Percocet.  Last pain medication given at 4:14 PM.    If your physician has prescribed pain medication that includes Acetaminophen (Tylenol), do not take additional Acetaminophen (Tylenol) while taking the prescribed medication.    Depression / Suicide Risk    As you are discharged from this [facility] facility, it is important to learn how to keep safe from harming yourself.    Recognize the warning signs:  · Abrupt changes in personality, positive or negative- including increase in energy   · Giving away possessions  · Change in eating patterns- significant weight changes-  positive or negative  · Change in sleeping patterns- unable to sleep or sleeping all the time   · Unwillingness or inability to communicate  · Depression  · Unusual sadness, discouragement and loneliness  · Talk of wanting to die  · Neglect of personal appearance   · Rebelliousness- reckless behavior  · Withdrawal from people/activities they love  · Confusion- inability to concentrate     If you or a loved one observes any of these behaviors or has concerns about self-harm, here's what you can do:  · Talk about it- your feelings and reasons for harming yourself  · Remove any means that you might use to hurt yourself  (examples: pills, rope, extension cords, firearm)  · Get professional help from the community (Mental Health, Substance Abuse, psychological counseling)  · Do not be alone:Call your Safe Contact- someone whom you trust who will be there for you.  · Call your local CRISIS HOTLINE 394-8512 or 999-655-9662  · Call your local Children's Mobile Crisis Response Team [place] (344) 851-8406 or www.ADmantX  · Call the toll free National Suicide Prevention Hotlines   · National Suicide Prevention Lifeline 757-274-CBGR (6215)  · National Hope Line Network 800-SUICIDE (670-4656)

## 2020-12-10 NOTE — OR NURSING
Arrived to Phase II after report from ANIYAH Mcgowan    VSS, denies nausea, reports pain 7/10, but states the pain is tolerable. Ambulated from gurney to chair with standby assist.    No surgical site. Pt. Did void x1 on arrive to discharge.     Alarms on and set to appropriate parameters. Call light within reach, rounding in place. Belongings given back to pt.

## 2020-12-10 NOTE — ANESTHESIA POSTPROCEDURE EVALUATION
Patient: Earnestine Lindsay    Procedure Summary     Date: 12/09/20 Room / Location: [place] [address] / SURGERY [facility]    Anesthesia Start: 1518 Anesthesia Stop: 1553    Procedures:       CYSTOSCOPY, WITH URETERAL STENT INSERTION (Right Bladder)      URETEROSCOPY (Right Ureter) Diagnosis: (RIGHT HYDRONEPHROSIS)    Surgeons: Dorian Estrada M.D. Responsible Provider: Parveen Cortes M.D.    Anesthesia Type: general ASA Status: 2          Final Anesthesia Type: general  Last vitals  BP   Blood Pressure: (!) 94/62    Temp   35.9 °C (96.7 °F)    Pulse   Pulse: 68   Resp   16    SpO2   100 %      Anesthesia Post Evaluation    Patient location during evaluation: PACU  Patient participation: complete - patient participated  Level of consciousness: awake and alert    Airway patency: patent  Anesthetic complications: no  Cardiovascular status: hemodynamically stable  Respiratory status: acceptable  Hydration status: euvolemic    PONV: none           Nurse Pain Score: 7 (NPRS)

## 2020-12-10 NOTE — OR NURSING
Pt's VSS; zofran given for nausea, emesis x1 but pt. States she is ready to go home. Pt. Able to void x2 while in DC area. States urine is a little bloody. Pain tolerable. DC instructions reviewed with pt. All questions answered. IV removed. Pt. D/c in w/c with RN to daughter at [facility].

## 2021-01-06 ENCOUNTER — HOSPITAL ENCOUNTER (OUTPATIENT)
Dept: RADIOLOGY | Facility: MEDICAL CENTER | Age: 46
End: 2021-01-06
Attending: UROLOGY
Payer: COMMERCIAL

## 2021-01-06 DIAGNOSIS — N13.30 HYDRONEPHROSIS, UNSPECIFIED HYDRONEPHROSIS TYPE: ICD-10-CM

## 2021-01-06 PROCEDURE — 78708 K FLOW/FUNCT IMAGE W/DRUG: CPT

## 2021-01-06 RX ORDER — FUROSEMIDE 10 MG/ML
INJECTION INTRAMUSCULAR; INTRAVENOUS
Status: DISCONTINUED
Start: 2021-01-06 | End: 2021-01-07 | Stop reason: HOSPADM

## 2021-03-02 ENCOUNTER — TELEPHONE (OUTPATIENT)
Dept: NEPHROLOGY | Facility: MEDICAL CENTER | Age: 46
End: 2021-03-02

## 2021-03-02 NOTE — TELEPHONE ENCOUNTER
Pt calling in regards to urine test. They are not sure if its going to the lab for a quick sample or for the 24 hour test.

## 2021-03-03 ENCOUNTER — TELEPHONE (OUTPATIENT)
Dept: NEPHROLOGY | Facility: MEDICAL CENTER | Age: 46
End: 2021-03-03

## 2021-04-05 ENCOUNTER — HOSPITAL ENCOUNTER (OUTPATIENT)
Dept: LAB | Facility: MEDICAL CENTER | Age: 46
End: 2021-04-05
Attending: INTERNAL MEDICINE
Payer: COMMERCIAL

## 2021-04-05 DIAGNOSIS — N18.2 CKD (CHRONIC KIDNEY DISEASE) STAGE 2, GFR 60-89 ML/MIN: ICD-10-CM

## 2021-04-05 DIAGNOSIS — N20.0 NEPHROLITHIASIS: ICD-10-CM

## 2021-04-05 LAB
ALBUMIN SERPL BCP-MCNC: 4.6 G/DL (ref 3.2–4.9)
ANION GAP SERPL CALC-SCNC: 7 MMOL/L (ref 7–16)
APPEARANCE UR: CLEAR
BILIRUB UR QL STRIP.AUTO: NEGATIVE
BUN SERPL-MCNC: 20 MG/DL (ref 8–22)
CALCIUM SERPL-MCNC: 10 MG/DL (ref 8.4–10.2)
CHLORIDE SERPL-SCNC: 112 MMOL/L (ref 96–112)
CO2 SERPL-SCNC: 23 MMOL/L (ref 20–33)
COLOR UR: YELLOW
CREAT SERPL-MCNC: 0.94 MG/DL (ref 0.5–1.4)
CREAT UR-MCNC: 20.08 MG/DL
CREAT UR-MCNC: 20.25 MG/DL
ERYTHROCYTE [DISTWIDTH] IN BLOOD BY AUTOMATED COUNT: 43.8 FL (ref 35.9–50)
FASTING STATUS PATIENT QL REPORTED: NORMAL
GLUCOSE SERPL-MCNC: 94 MG/DL (ref 65–99)
GLUCOSE UR STRIP.AUTO-MCNC: NEGATIVE MG/DL
HCT VFR BLD AUTO: 43.5 % (ref 37–47)
HGB BLD-MCNC: 13.6 G/DL (ref 12–16)
KETONES UR STRIP.AUTO-MCNC: NEGATIVE MG/DL
LEUKOCYTE ESTERASE UR QL STRIP.AUTO: NEGATIVE
MCH RBC QN AUTO: 32.2 PG (ref 27–33)
MCHC RBC AUTO-ENTMCNC: 31.3 G/DL (ref 33.6–35)
MCV RBC AUTO: 102.8 FL (ref 81.4–97.8)
MICRO URNS: NORMAL
MICROALBUMIN UR-MCNC: <1.2 MG/DL
MICROALBUMIN/CREAT UR: NORMAL MG/G (ref 0–30)
NITRITE UR QL STRIP.AUTO: NEGATIVE
PH UR STRIP.AUTO: 7 [PH] (ref 5–8)
PHOSPHATE SERPL-MCNC: 3.2 MG/DL (ref 2.5–4.5)
PLATELET # BLD AUTO: 295 K/UL (ref 164–446)
PMV BLD AUTO: 10.3 FL (ref 9–12.9)
POTASSIUM SERPL-SCNC: 4.4 MMOL/L (ref 3.6–5.5)
PROT UR QL STRIP: NEGATIVE MG/DL
PROT UR-MCNC: <4 MG/DL (ref 0–15)
PROT/CREAT UR: NORMAL MG/G (ref 10–107)
PTH-INTACT SERPL-MCNC: 36.3 PG/ML (ref 14–72)
RBC # BLD AUTO: 4.23 M/UL (ref 4.2–5.4)
RBC UR QL AUTO: NEGATIVE
SODIUM SERPL-SCNC: 142 MMOL/L (ref 135–145)
SP GR UR STRIP.AUTO: 1.01
URATE SERPL-MCNC: 4.7 MG/DL (ref 1.9–8.2)
WBC # BLD AUTO: 5.1 K/UL (ref 4.8–10.8)

## 2021-04-05 PROCEDURE — 82040 ASSAY OF SERUM ALBUMIN: CPT

## 2021-04-05 PROCEDURE — 81003 URINALYSIS AUTO W/O SCOPE: CPT

## 2021-04-05 PROCEDURE — 82043 UR ALBUMIN QUANTITATIVE: CPT

## 2021-04-05 PROCEDURE — 36415 COLL VENOUS BLD VENIPUNCTURE: CPT

## 2021-04-05 PROCEDURE — 84100 ASSAY OF PHOSPHORUS: CPT

## 2021-04-05 PROCEDURE — 82570 ASSAY OF URINE CREATININE: CPT

## 2021-04-05 PROCEDURE — 84156 ASSAY OF PROTEIN URINE: CPT

## 2021-04-05 PROCEDURE — 80048 BASIC METABOLIC PNL TOTAL CA: CPT

## 2021-04-05 PROCEDURE — 83970 ASSAY OF PARATHORMONE: CPT

## 2021-04-05 PROCEDURE — 84550 ASSAY OF BLOOD/URIC ACID: CPT

## 2021-04-05 PROCEDURE — 85027 COMPLETE CBC AUTOMATED: CPT

## 2021-04-05 PROCEDURE — 82306 VITAMIN D 25 HYDROXY: CPT

## 2021-04-06 ENCOUNTER — HOSPITAL ENCOUNTER (OUTPATIENT)
Facility: MEDICAL CENTER | Age: 46
End: 2021-04-06
Attending: INTERNAL MEDICINE
Payer: COMMERCIAL

## 2021-04-06 LAB — 25(OH)D3 SERPL-MCNC: 48 NG/ML (ref 30–80)

## 2021-04-06 PROCEDURE — 84105 ASSAY OF URINE PHOSPHORUS: CPT

## 2021-04-06 PROCEDURE — 83945 ASSAY OF OXALATE: CPT

## 2021-04-06 PROCEDURE — 82570 ASSAY OF URINE CREATININE: CPT

## 2021-04-06 PROCEDURE — 83735 ASSAY OF MAGNESIUM: CPT

## 2021-04-06 PROCEDURE — 84300 ASSAY OF URINE SODIUM: CPT

## 2021-04-06 PROCEDURE — 82507 ASSAY OF CITRATE: CPT

## 2021-04-06 PROCEDURE — 84560 ASSAY OF URINE/URIC ACID: CPT

## 2021-04-06 PROCEDURE — 82340 ASSAY OF CALCIUM IN URINE: CPT

## 2021-04-06 PROCEDURE — 82436 ASSAY OF URINE CHLORIDE: CPT

## 2021-04-06 PROCEDURE — 84133 ASSAY OF URINE POTASSIUM: CPT

## 2021-04-06 PROCEDURE — 83986 ASSAY PH BODY FLUID NOS: CPT

## 2021-04-13 ENCOUNTER — OFFICE VISIT (OUTPATIENT)
Dept: NEPHROLOGY | Facility: MEDICAL CENTER | Age: 46
End: 2021-04-13
Payer: COMMERCIAL

## 2021-04-13 VITALS
WEIGHT: 150 LBS | BODY MASS INDEX: 25.61 KG/M2 | OXYGEN SATURATION: 97 % | HEART RATE: 84 BPM | HEIGHT: 64 IN | SYSTOLIC BLOOD PRESSURE: 128 MMHG | TEMPERATURE: 97.5 F | DIASTOLIC BLOOD PRESSURE: 72 MMHG

## 2021-04-13 DIAGNOSIS — N20.0 NEPHROLITHIASIS: ICD-10-CM

## 2021-04-13 DIAGNOSIS — F31.9 BIPOLAR AFFECTIVE DISORDER, REMISSION STATUS UNSPECIFIED (HCC): ICD-10-CM

## 2021-04-13 DIAGNOSIS — N25.1 DIABETES INSIPIDUS, NEPHROGENIC (HCC): ICD-10-CM

## 2021-04-13 DIAGNOSIS — N18.2 CKD (CHRONIC KIDNEY DISEASE) STAGE 2, GFR 60-89 ML/MIN: ICD-10-CM

## 2021-04-13 LAB
CALCIUM 24H UR-MCNC: 3.5 MG/DL
CALCIUM 24H UR-MRATE: 186 MG/D
CHLORIDE 24H UR-SCNC: 31 MMOL/L
CHLORIDE 24H UR-SRATE: 164 MMOL/D (ref 140–250)
CITRATE 24H UR-MCNC: 66 MG/L
CITRATE 24H UR-MRATE: 350 MG/D (ref 320–1240)
COLLECT DURATION TIME SPEC: 24 HRS
CREAT 24H UR-MCNC: 25 MG/DL
CREAT 24H UR-MRATE: 1325 MG/D (ref 700–1600)
MAGNESIUM 24H UR-MCNC: 4.2 MG/DL
MAGNESIUM 24H UR-MRATE: 223 MG/D (ref 12–199)
OXALATE 24H UR-MCNC: 20 MG/L
OXALATE 24H UR-MRATE: 106 MG/D (ref 13–40)
PATHOLOGY STUDY: ABNORMAL
PH UR: 7.17 [PH] (ref 5–7.5)
PHOSPHATE 24H UR-MCNC: 16 MG/DL
PHOSPHATE 24H UR-MRATE: 848 MG/D (ref 400–1300)
POTASSIUM 24H UR-SCNC: 18 MMOL/L
POTASSIUM 24H UR-SRATE: 95 MMOL/D (ref 25–125)
SODIUM 24H UR-SCNC: 38 MMOL/L
SODIUM 24H UR-SRATE: 201 MMOL/D (ref 51–286)
TOTAL VOLUME 1105: ABNORMAL ML
URATE 24H UR-MCNC: 9.7 MG/DL
URATE 24H UR-MRATE: 514 MG/D (ref 250–750)

## 2021-04-13 PROCEDURE — 99214 OFFICE O/P EST MOD 30 MIN: CPT | Performed by: INTERNAL MEDICINE

## 2021-04-13 RX ORDER — AMILORIDE HYDROCHLORIDE 5 MG/1
5 TABLET ORAL 2 TIMES DAILY
Qty: 180 TABLET | Refills: 3 | Status: SHIPPED | OUTPATIENT
Start: 2021-04-13 | End: 2021-05-19

## 2021-04-13 RX ORDER — POTASSIUM CITRATE 15 MEQ/1
1 TABLET, EXTENDED RELEASE ORAL 2 TIMES DAILY
Qty: 180 TABLET | Refills: 3 | Status: SHIPPED | OUTPATIENT
Start: 2021-04-13 | End: 2021-06-15

## 2021-04-13 ASSESSMENT — ENCOUNTER SYMPTOMS
SHORTNESS OF BREATH: 1
ABDOMINAL PAIN: 1
FEVER: 0

## 2021-04-13 ASSESSMENT — FIBROSIS 4 INDEX: FIB4 SCORE: 0.61

## 2021-04-13 NOTE — PATIENT INSTRUCTIONS
[employer] Low Oxalate diet.     NEW medicines  - amiloride 5mg twice a day (once in morning and once in mid-afternoon)  - potassium citrate 1 tablet (15 mEq) Twice a day    Get your lithium level checked one week after starting amiloride and again one week later and contact your doctor to see if your lithium dose needs to be adjusted.    - repeat 24-hour urine collection in one month (mid-May).

## 2021-04-13 NOTE — PROGRESS NOTES
"Chief Complaint   Patient presents with   • Follow-Up         HPI:  Earnestine Lindsay is a 45 y.o. female with a history of nephrolithiasis, bipolar II disorder who presents today for follow up.     Re: nephrolithiasis, She has had more kidney stones than she can count. Her first kidney stone episode was mid-20's. She has had stones multiple times a year since her 20's. She has never had sestamibi scan.  Stones have been so severe in the past that she has needed laser lithotripsy. 24-h test results as below. She still has occasional pain in her right flank.     Re: Bipolar disorder, she has been on lithium therapy since late 20's. Kidney stones came before lithium therapy. She remains on lithium therapy. She had 5300mL on the 24-h collection and she admits she missed episodes of urination.     Re: Diabetes insipidus. She wakes every hour at night to urinate. She is constantly thirsty. She drinks \"a lot\" especially at night.       Past Medical History:   Diagnosis Date   • Anxiety    • Asthma     inhalers   • Bipolar 2 disorder (HCC)     depression , anxiety   • Bipolar disorder (HCC) 11/11/2020   • Cancer (HCC) 1996    cervical   • Hypothyroidism 11/11/2020   • Nephrolithiasis 11/11/2020   • Pain     back   • Seizure (HCC) 2013    takes po meds   • Stroke (HCC) 2013    TIA       Past Surgical History:   Procedure Laterality Date   • PB CYSTOSCOPY,INSERT URETERAL STENT Right 12/9/2020    Procedure: CYSTOSCOPY, WITH URETERAL STENT INSERTION;  Surgeon: Dorian Estrada M.D.;  Location: SURGERY [facility];  Service: Urology   • PB CYSTO/URETERO/PYELOSCOPY, DX Right 12/9/2020    Procedure: URETEROSCOPY;  Surgeon: Dorian Estrada M.D.;  Location: SURGERY [facility];  Service: Urology   • CHOLECYSTECTOMY  2009   • ABDOMINAL HYSTERECTOMY TOTAL  2002   • EXPLORATORY LAPAROTOMY     • HAND SURGERY      3 x right hand, 1x left   • LITHOTRIPSY Right 2014 and 2015    kidney stone   • LYSIS ADHESIONS GENERAL     • " OOPHORECTOMY  2003, 2004,    ovarian cysct removal         Outpatient Encounter Medications as of 4/13/2021   Medication Sig Dispense Refill   • topiramate (TOPAMAX) 100 MG Tab      • Cetirizine HCl (ZYRTEC PO) Take  by mouth as needed.     • oxyCODONE-Acetaminophen (PERCOCET PO) Take  by mouth.     • albuterol 108 (90 Base) MCG/ACT Aero Soln inhalation aerosol 90 Puffs.     • ALPRAZolam (XANAX) 0.5 MG Tab 0.5 mg.     • benzonatate (TESSALON) 100 MG Cap 100 mg.     • cyclobenzaprine (FLEXERIL) 10 mg Tab 10 mg.     • dicyclomine (BENTYL) 20 MG Tab 20 mg.     • Eszopiclone 3 MG Tab 3 mg.     • levothyroxine (SYNTHROID) 75 MCG Tab 75 mcg.     • lidocaine (LIDODERM) 5 % Patch 5 Patches.     • liothyronine (CYTOMEL) 5 MCG Tab 0.005 mcg.     • ondansetron (ZOFRAN ODT) 4 MG TABLET DISPERSIBLE 4 mg.     • topiramate (TOPAMAX) 50 MG tablet Take 50 mg by mouth 2 Times a Day.     • SUMAtriptan (IMITREX) 50 MG Tab 50 mg.     • Multiple Vitamins-Minerals (MULTIVITAMIN ADULT PO) Take  by mouth.     • Cholecalciferol (VITAMIN D) 125 MCG (5000 UT) Cap Take  by mouth.     • 7-Keto DHEA Powder by Does not apply route.     • lithium (ESKALITH) 300 MG Tab Take 3 Tabs by mouth every day. 1 tab in AM, 2 tabs in PM     • TESTOSTERONE AQUEOUS IM      • fluticasone (FLONASE) 50 MCG/ACT nasal spray fluticasone propionate 50 mcg/actuation nasal spray,suspension     • HYDROmorphone (DILAUDID) 4 MG Tab hydromorphone 4 mg tablet     • lamotrigine (LAMICTAL) 200 MG tablet Take 1 Tab by mouth 2 Times a Day.     • progesterone (PROMETRIUM) 100 MG Cap Take 1 Cap by mouth every day.     • butalbital-acetaminophen-caffeine-codeine (FIORICET W/CODEINE) -67-30 MG per capsule 40 Caps.     • loratadine (CLARITIN) 10 MG Tab 10 mg.     • Spacer/Aero-Holding Chambers (AEROCHAMBER MAX W/FLOW-VU) Misc Aerochamber Plus Flow-Vu       No facility-administered encounter medications on file as of 4/13/2021.        Allergies   Allergen Reactions   •  "Promethazine Hives           Family History   Problem Relation Age of Onset   • Kidney stones Brother    • Kidney Disease Neg Hx        Review of Systems   Constitutional: Negative for fever.   Respiratory: Positive for shortness of breath (\"always\").    Cardiovascular: Negative for chest pain.   Gastrointestinal: Positive for abdominal pain.   Genitourinary: Negative for dysuria and hematuria.   All other systems reviewed and are negative.      /72 (BP Location: Right arm, Patient Position: Sitting, BP Cuff Size: Adult)   Pulse 84   Temp 36.4 °C (97.5 °F) (Temporal)   Ht 1.626 m (5' 4\")   Wt 68 kg (150 lb)   SpO2 97%   BMI 25.75 kg/m²     Physical Exam   Constitutional: She is oriented to person, place, and time and well-developed, well-nourished, and in no distress. No distress.   HENT:   Mouth/Throat: Oropharynx is clear and moist. No oropharyngeal exudate.   Eyes: EOM are normal. No scleral icterus.   Neck: No tracheal deviation present.   Cardiovascular: Normal rate, regular rhythm and normal heart sounds.   No murmur heard.  Pulmonary/Chest: Effort normal and breath sounds normal. No stridor. She has no rales.   Abdominal: Soft. Bowel sounds are normal. There is no abdominal tenderness.   Musculoskeletal:         General: No edema. Normal range of motion.      Cervical back: Neck supple.   Neurological: She is alert and oriented to person, place, and time.   Skin: Skin is warm and dry. No rash noted.   Psychiatric: Mood and affect normal.         Labs reviewed.    Recent Labs     09/04/20  0755 09/04/20  0755 11/04/20  1611 12/08/20  1547 04/05/21  0819   ALBUMIN 5.0*  --  4.3  --  4.6   SODIUM 140   < > 140 140 142   POTASSIUM 3.7   < > 3.9 4.1 4.4   CHLORIDE 109   < > 109 107 112   CO2 20   < > 18* 23 23   BUN 19   < > 17 11 20   CREATININE 1.09   < > 0.89 0.95 0.94   PHOSPHORUS  --   --  3.4  --  3.2    < > = values in this interval not displayed.       Lab Results   Component Value Date/Time    " WBC 5.1 04/05/2021 08:19 AM    RBC 4.23 04/05/2021 08:19 AM    HEMOGLOBIN 13.6 04/05/2021 08:19 AM    HEMATOCRIT 43.5 04/05/2021 08:19 AM    .8 (H) 04/05/2021 08:19 AM    MCH 32.2 04/05/2021 08:19 AM    MCHC 31.3 (L) 04/05/2021 08:19 AM    MPV 10.3 04/05/2021 08:19 AM             URINALYSIS:  Lab Results   Component Value Date/Time    COLORURINE Yellow 04/05/2021 0818    CLARITY Clear 04/05/2021 0818    SPECGRAVITY 1.010 04/05/2021 0818    PHURINE 7.17 04/06/2021 0700    KETONES Negative 04/05/2021 0818    PROTEINURIN Negative 04/05/2021 0818    BILIRUBINUR Negative 04/05/2021 0818    NITRITE Negative 04/05/2021 0818    LEUKESTERAS Negative 04/05/2021 0818    OCCULTBLOOD Negative 04/05/2021 0818       UPC  Lab Results   Component Value Date/Time    TOTPROTUR <4.0 04/05/2021 0818      Lab Results   Component Value Date/Time    CREATININEU 1325 04/06/2021 0700         Imaging reviewed  No orders to display     24-hour urine collection April 2021 with 5300 mL, high urine oxalate, low average urine citric acid, 186 mg/day calcium    Assessment:  Earnestine Lindsay is a 45 y.o. female with a history of nephrolithiasis, bipolar II disorder who presents today for follow up.    Plan:  1. Nephrolithiasis  -Most likely due to calcium oxalate stones given 24-hour urine results.  Recommend low oxalate diet.  Recommend starting potassium citrate 15 mEq p.o. twice daily.  Repeat 24-hour urine collection in 1 month to assess progress.    2. CKD (chronic kidney disease) stage 2, GFR 60-89 ml/min  -Stable.  Patient hovers between CKD stage II and IIIa.  CKD likely due to mild interstitial disease from long-term lithium exposure.  Recommend starting amiloride as below.  Avoid NSAIDs and other nephrotoxins.     3.  Diabetes insipidus.  -Likely from long-term lithium use.  Recommend starting amiloride 5 mg p.o. twice daily.  If polyuria and polydipsia do not improve with amiloride 5 mg dose, would recommend increase to 10 mg p.o.  twice daily.  Recommend check lithium level weekly for 2 weeks after starting amiloride, with adjustments as needed, defer to psychiatry or primary care.      RTC 3 months with preclinic labs    Trung Schwab MD  Nephrology

## 2021-04-23 ENCOUNTER — HOSPITAL ENCOUNTER (OUTPATIENT)
Dept: LAB | Facility: MEDICAL CENTER | Age: 46
End: 2021-04-23
Attending: INTERNAL MEDICINE
Payer: COMMERCIAL

## 2021-04-23 DIAGNOSIS — F31.9 BIPOLAR AFFECTIVE DISORDER, REMISSION STATUS UNSPECIFIED (HCC): ICD-10-CM

## 2021-04-23 DIAGNOSIS — N18.2 CKD (CHRONIC KIDNEY DISEASE) STAGE 2, GFR 60-89 ML/MIN: ICD-10-CM

## 2021-04-23 DIAGNOSIS — N20.0 NEPHROLITHIASIS: ICD-10-CM

## 2021-04-23 PROCEDURE — 36415 COLL VENOUS BLD VENIPUNCTURE: CPT

## 2021-04-23 PROCEDURE — 80178 ASSAY OF LITHIUM: CPT

## 2021-04-24 LAB — LITHIUM SERPL-MCNC: 1.1 MMOL/L (ref 0.6–1.2)

## 2021-04-30 ENCOUNTER — HOSPITAL ENCOUNTER (OUTPATIENT)
Dept: LAB | Facility: MEDICAL CENTER | Age: 46
End: 2021-04-30
Attending: INTERNAL MEDICINE
Payer: COMMERCIAL

## 2021-04-30 DIAGNOSIS — F31.9 BIPOLAR AFFECTIVE DISORDER, REMISSION STATUS UNSPECIFIED (HCC): ICD-10-CM

## 2021-04-30 LAB — LITHIUM SERPL-MCNC: 1.4 MMOL/L (ref 0.6–1.2)

## 2021-04-30 PROCEDURE — 36415 COLL VENOUS BLD VENIPUNCTURE: CPT

## 2021-04-30 PROCEDURE — 80178 ASSAY OF LITHIUM: CPT

## 2021-05-04 ENCOUNTER — PATIENT MESSAGE (OUTPATIENT)
Dept: NEPHROLOGY | Facility: MEDICAL CENTER | Age: 46
End: 2021-05-04

## 2021-05-04 DIAGNOSIS — F31.9 BIPOLAR AFFECTIVE DISORDER, REMISSION STATUS UNSPECIFIED (HCC): ICD-10-CM

## 2021-05-04 DIAGNOSIS — Z79.899 LITHIUM USE: ICD-10-CM

## 2021-05-06 ENCOUNTER — APPOINTMENT (RX ONLY)
Dept: URBAN - METROPOLITAN AREA CLINIC 4 | Facility: CLINIC | Age: 46
Setting detail: DERMATOLOGY
End: 2021-05-06

## 2021-05-06 DIAGNOSIS — D18.0 HEMANGIOMA: ICD-10-CM

## 2021-05-06 DIAGNOSIS — L82.1 OTHER SEBORRHEIC KERATOSIS: ICD-10-CM

## 2021-05-06 DIAGNOSIS — L81.4 OTHER MELANIN HYPERPIGMENTATION: ICD-10-CM

## 2021-05-06 DIAGNOSIS — Q826 OTHER SPECIFIED ANOMALIES OF SKIN: ICD-10-CM

## 2021-05-06 DIAGNOSIS — Q819 OTHER SPECIFIED ANOMALIES OF SKIN: ICD-10-CM

## 2021-05-06 DIAGNOSIS — L21.8 OTHER SEBORRHEIC DERMATITIS: ICD-10-CM

## 2021-05-06 DIAGNOSIS — D22 MELANOCYTIC NEVI: ICD-10-CM

## 2021-05-06 DIAGNOSIS — Q828 OTHER SPECIFIED ANOMALIES OF SKIN: ICD-10-CM

## 2021-05-06 DIAGNOSIS — Z71.89 OTHER SPECIFIED COUNSELING: ICD-10-CM

## 2021-05-06 PROBLEM — D23.39 OTHER BENIGN NEOPLASM OF SKIN OF OTHER PARTS OF FACE: Status: ACTIVE | Noted: 2021-05-06

## 2021-05-06 PROBLEM — D18.01 HEMANGIOMA OF SKIN AND SUBCUTANEOUS TISSUE: Status: ACTIVE | Noted: 2021-05-06

## 2021-05-06 PROBLEM — D22.5 MELANOCYTIC NEVI OF TRUNK: Status: ACTIVE | Noted: 2021-05-06

## 2021-05-06 PROBLEM — D22.71 MELANOCYTIC NEVI OF RIGHT LOWER LIMB, INCLUDING HIP: Status: ACTIVE | Noted: 2021-05-06

## 2021-05-06 PROBLEM — D22.72 MELANOCYTIC NEVI OF LEFT LOWER LIMB, INCLUDING HIP: Status: ACTIVE | Noted: 2021-05-06

## 2021-05-06 PROBLEM — D22.62 MELANOCYTIC NEVI OF LEFT UPPER LIMB, INCLUDING SHOULDER: Status: ACTIVE | Noted: 2021-05-06

## 2021-05-06 PROBLEM — D22.61 MELANOCYTIC NEVI OF RIGHT UPPER LIMB, INCLUDING SHOULDER: Status: ACTIVE | Noted: 2021-05-06

## 2021-05-06 PROBLEM — L85.8 OTHER SPECIFIED EPIDERMAL THICKENING: Status: ACTIVE | Noted: 2021-05-06

## 2021-05-06 PROBLEM — D22.39 MELANOCYTIC NEVI OF OTHER PARTS OF FACE: Status: ACTIVE | Noted: 2021-05-06

## 2021-05-06 PROCEDURE — ? OBSERVATION

## 2021-05-06 PROCEDURE — ? SUNSCREEN TREATMENT REGIMEN

## 2021-05-06 PROCEDURE — ? ADDITIONAL NOTES

## 2021-05-06 PROCEDURE — 99213 OFFICE O/P EST LOW 20 MIN: CPT

## 2021-05-06 PROCEDURE — ? COUNSELING

## 2021-05-06 ASSESSMENT — LOCATION SIMPLE DESCRIPTION DERM
LOCATION SIMPLE: UPPER BACK
LOCATION SIMPLE: LEFT FOREHEAD
LOCATION SIMPLE: LEFT THIGH
LOCATION SIMPLE: LEFT POSTERIOR THIGH
LOCATION SIMPLE: CHEST
LOCATION SIMPLE: ABDOMEN
LOCATION SIMPLE: RIGHT POSTERIOR UPPER ARM
LOCATION SIMPLE: RIGHT SHOULDER
LOCATION SIMPLE: LEFT UPPER BACK
LOCATION SIMPLE: RIGHT POSTERIOR THIGH
LOCATION SIMPLE: LEFT POSTERIOR UPPER ARM
LOCATION SIMPLE: LEFT SCALP
LOCATION SIMPLE: LEFT UPPER ARM
LOCATION SIMPLE: RIGHT THIGH
LOCATION SIMPLE: RIGHT FOREHEAD
LOCATION SIMPLE: RIGHT UPPER ARM

## 2021-05-06 ASSESSMENT — LOCATION ZONE DERM
LOCATION ZONE: SCALP
LOCATION ZONE: FACE
LOCATION ZONE: TRUNK
LOCATION ZONE: LEG
LOCATION ZONE: ARM

## 2021-05-06 ASSESSMENT — LOCATION DETAILED DESCRIPTION DERM
LOCATION DETAILED: LEFT PROXIMAL POSTERIOR UPPER ARM
LOCATION DETAILED: LEFT ANTERIOR PROXIMAL UPPER ARM
LOCATION DETAILED: LEFT SUPERIOR MEDIAL UPPER BACK
LOCATION DETAILED: INFERIOR THORACIC SPINE
LOCATION DETAILED: LEFT MEDIAL FOREHEAD
LOCATION DETAILED: LEFT ANTERIOR DISTAL UPPER ARM
LOCATION DETAILED: LEFT INFERIOR MEDIAL FOREHEAD
LOCATION DETAILED: RIGHT ANTERIOR PROXIMAL THIGH
LOCATION DETAILED: RIGHT DISTAL POSTERIOR THIGH
LOCATION DETAILED: RIGHT ANTERIOR DISTAL UPPER ARM
LOCATION DETAILED: SUPERIOR THORACIC SPINE
LOCATION DETAILED: RIGHT ANTERIOR PROXIMAL UPPER ARM
LOCATION DETAILED: RIGHT PROXIMAL POSTERIOR UPPER ARM
LOCATION DETAILED: MIDDLE STERNUM
LOCATION DETAILED: RIGHT POSTERIOR SHOULDER
LOCATION DETAILED: EPIGASTRIC SKIN
LOCATION DETAILED: LEFT ANTERIOR LATERAL PROXIMAL UPPER ARM
LOCATION DETAILED: RIGHT MEDIAL FOREHEAD
LOCATION DETAILED: LOWER STERNUM
LOCATION DETAILED: LEFT ANTERIOR DISTAL THIGH
LOCATION DETAILED: RIGHT PROXIMAL POSTERIOR THIGH
LOCATION DETAILED: LEFT PROXIMAL POSTERIOR THIGH
LOCATION DETAILED: LEFT DISTAL POSTERIOR THIGH
LOCATION DETAILED: LEFT ANTERIOR PROXIMAL THIGH
LOCATION DETAILED: LEFT MEDIAL UPPER BACK
LOCATION DETAILED: LEFT MEDIAL FRONTAL SCALP

## 2021-05-06 NOTE — PROCEDURE: ADDITIONAL NOTES
Detail Level: Detailed
Additional Notes: Previously recommended CeraVe SA, amlactin smoothing. May continue with recommended products.
Detail Level: Simple
Render Risk Assessment In Note?: no
Additional Notes: Discussed treatment. Patient declines.

## 2021-05-07 ENCOUNTER — HOSPITAL ENCOUNTER (OUTPATIENT)
Dept: LAB | Facility: MEDICAL CENTER | Age: 46
End: 2021-05-07
Attending: INTERNAL MEDICINE
Payer: COMMERCIAL

## 2021-05-07 DIAGNOSIS — F31.9 BIPOLAR AFFECTIVE DISORDER, REMISSION STATUS UNSPECIFIED (HCC): ICD-10-CM

## 2021-05-07 DIAGNOSIS — Z79.899 LITHIUM USE: ICD-10-CM

## 2021-05-07 PROCEDURE — 80178 ASSAY OF LITHIUM: CPT

## 2021-05-07 PROCEDURE — 36415 COLL VENOUS BLD VENIPUNCTURE: CPT

## 2021-05-07 RX ORDER — LITHIUM CARBONATE 300 MG
750 TABLET ORAL DAILY
Qty: 75 TABLET | Refills: 3
Start: 2021-05-07 | End: 2021-05-11

## 2021-05-08 LAB — LITHIUM SERPL-MCNC: 1.3 MMOL/L (ref 0.6–1.2)

## 2021-05-08 NOTE — PROGRESS NOTES
Lithium dose decreased from 900mg daily to 750mg daily per patient due to high lithium levels.    Plan to check in around 5/17/21 about polyuria/polydipsia and see if amiloride dose needs to be increased.    Trung Schwab MD  Nephrology

## 2021-05-11 RX ORDER — LITHIUM CARBONATE 300 MG
600 TABLET ORAL DAILY
Qty: 60 TABLET | Refills: 3
Start: 2021-05-11 | End: 2021-07-23

## 2021-05-17 ENCOUNTER — HOSPITAL ENCOUNTER (OUTPATIENT)
Dept: LAB | Facility: MEDICAL CENTER | Age: 46
End: 2021-05-17
Attending: INTERNAL MEDICINE
Payer: COMMERCIAL

## 2021-05-17 DIAGNOSIS — Z79.899 LITHIUM USE: ICD-10-CM

## 2021-05-17 DIAGNOSIS — F31.9 BIPOLAR AFFECTIVE DISORDER, REMISSION STATUS UNSPECIFIED (HCC): ICD-10-CM

## 2021-05-17 LAB — LITHIUM SERPL-MCNC: 0.6 MMOL/L (ref 0.6–1.2)

## 2021-05-17 PROCEDURE — 36415 COLL VENOUS BLD VENIPUNCTURE: CPT

## 2021-05-17 PROCEDURE — 80178 ASSAY OF LITHIUM: CPT

## 2021-05-18 ENCOUNTER — HOSPITAL ENCOUNTER (OUTPATIENT)
Facility: MEDICAL CENTER | Age: 46
End: 2021-05-18
Attending: INTERNAL MEDICINE
Payer: COMMERCIAL

## 2021-05-18 PROCEDURE — 82570 ASSAY OF URINE CREATININE: CPT

## 2021-05-18 PROCEDURE — 82436 ASSAY OF URINE CHLORIDE: CPT

## 2021-05-18 PROCEDURE — 83945 ASSAY OF OXALATE: CPT

## 2021-05-18 PROCEDURE — 83735 ASSAY OF MAGNESIUM: CPT

## 2021-05-18 PROCEDURE — 82507 ASSAY OF CITRATE: CPT

## 2021-05-18 PROCEDURE — 84105 ASSAY OF URINE PHOSPHORUS: CPT

## 2021-05-18 PROCEDURE — 84560 ASSAY OF URINE/URIC ACID: CPT

## 2021-05-18 PROCEDURE — 82340 ASSAY OF CALCIUM IN URINE: CPT

## 2021-05-18 PROCEDURE — 83986 ASSAY PH BODY FLUID NOS: CPT

## 2021-05-18 PROCEDURE — 84300 ASSAY OF URINE SODIUM: CPT

## 2021-05-18 PROCEDURE — 84133 ASSAY OF URINE POTASSIUM: CPT

## 2021-05-19 DIAGNOSIS — N25.1 DIABETES INSIPIDUS, NEPHROGENIC (HCC): ICD-10-CM

## 2021-05-19 RX ORDER — AMILORIDE HYDROCHLORIDE 5 MG/1
10 TABLET ORAL 2 TIMES DAILY
Qty: 360 TABLET | Refills: 3 | Status: SHIPPED | OUTPATIENT
Start: 2021-05-19 | End: 2022-03-30

## 2021-05-19 NOTE — PROGRESS NOTES
Oh wow! Each of those jugs is 3 liters! That means you're urinating over 6 liters a day. That is a LOT! And it suggests that the amiloride dose of 5mg isn't strong enough.     I think we should increase it to 10mg twice a day (10mg in morning and 10mg in afternoon). Please continue to have your lithium level checked once a week as this change in dose could affect your lithium level. I will send in a new prescription with the updated quantity but this will mean taking two 5mg tabs in morning and two 5mg tabs in afternoon.    Sincerely,  Dr. Schwab (kidney doctor)       ===View-only below this line===      ----- Message -----       From:Earnestine Lindsay       Sent:5/17/2021  9:07 AM PDT         To:Physician Trung Schwab    Subject:Test Result Question    Good morning Dr Schwab,    I went for my lithium blood work this morning. I also went to drop off my specimen for my urine study however they wouldn't accept it. I filled 2 containers in 18 hours but they stated they needed a full 24 hours. So they sent me home with 3 containers to do it again. I just wanted to let you know that is the reason it will be a little late in getting to you.     Have a great week!    Earnestine      ----- Message -----       From:Physician Trung Schwab       Sent:5/11/2021  5:08 PM PDT         To:Earnestine Lindsay    Subject:RE: Test Result Question    Thanks for the update!    Sincerely,  Dr. Schwab (kidney doctor)         ----- Message -----       From:Earnestine Lindsay       Sent:5/11/2021 10:17 AM PDT         To:Physician Trung Schwab    Subject:RE: Test Result Question    Hello Dr Schwab,    I wanted to let you know that Dr Thomas lowered my [place] again. I am now at 600mg a day.     Have a great week!    Earnestine

## 2021-05-25 ENCOUNTER — HOSPITAL ENCOUNTER (OUTPATIENT)
Dept: LAB | Facility: MEDICAL CENTER | Age: 46
End: 2021-05-25
Attending: INTERNAL MEDICINE
Payer: COMMERCIAL

## 2021-05-25 DIAGNOSIS — F31.9 BIPOLAR AFFECTIVE DISORDER, REMISSION STATUS UNSPECIFIED (HCC): ICD-10-CM

## 2021-05-25 DIAGNOSIS — Z79.899 LITHIUM USE: ICD-10-CM

## 2021-05-25 LAB — LITHIUM SERPL-MCNC: 0.6 MMOL/L (ref 0.6–1.2)

## 2021-05-25 PROCEDURE — 36415 COLL VENOUS BLD VENIPUNCTURE: CPT

## 2021-05-25 PROCEDURE — 80178 ASSAY OF LITHIUM: CPT

## 2021-05-27 LAB
CALCIUM 24H UR-MCNC: 3.3 MG/DL
CALCIUM 24H UR-MRATE: 228 MG/D
CHLORIDE 24H UR-SCNC: <20 MMOL/L
CHLORIDE 24H UR-SRATE: <138 MMOL/D (ref 140–250)
CITRATE 24H UR-MCNC: 45 MG/L
CITRATE 24H UR-MRATE: 310 MG/D (ref 320–1240)
COLLECT DURATION TIME SPEC: 24 HRS
CREAT 24H UR-MCNC: 26 MG/DL
CREAT 24H UR-MRATE: 1794 MG/D (ref 700–1600)
MAGNESIUM 24H UR-MCNC: 3 MG/DL
MAGNESIUM 24H UR-MRATE: 207 MG/D (ref 12–199)
OXALATE 24H UR-MCNC: 8 MG/L
OXALATE 24H UR-MRATE: 55 MG/D (ref 13–40)
PATHOLOGY STUDY: ABNORMAL
PH UR: 7.39 [PH] (ref 5–7.5)
PHOSPHATE 24H UR-MCNC: 15 MG/DL
PHOSPHATE 24H UR-MRATE: 1035 MG/D (ref 400–1300)
POTASSIUM 24H UR-SCNC: 19 MMOL/L
POTASSIUM 24H UR-SRATE: 131 MMOL/D (ref 25–125)
SODIUM 24H UR-SCNC: <20 MMOL/L
SODIUM 24H UR-SRATE: <138 MMOL/D (ref 51–286)
TOTAL VOLUME 1105: 6900 ML
URATE 24H UR-MCNC: 11.7 MG/DL
URATE 24H UR-MRATE: 807 MG/D (ref 250–750)

## 2021-06-04 DIAGNOSIS — Z79.899 LITHIUM USE: ICD-10-CM

## 2021-06-04 DIAGNOSIS — F31.9 BIPOLAR AFFECTIVE DISORDER, REMISSION STATUS UNSPECIFIED (HCC): ICD-10-CM

## 2021-06-08 ENCOUNTER — HOSPITAL ENCOUNTER (OUTPATIENT)
Dept: LAB | Facility: MEDICAL CENTER | Age: 46
End: 2021-06-08
Attending: INTERNAL MEDICINE
Payer: COMMERCIAL

## 2021-06-08 DIAGNOSIS — F31.9 BIPOLAR AFFECTIVE DISORDER, REMISSION STATUS UNSPECIFIED (HCC): ICD-10-CM

## 2021-06-08 DIAGNOSIS — Z79.899 LITHIUM USE: ICD-10-CM

## 2021-06-08 LAB — LITHIUM SERPL-MCNC: 1.2 MMOL/L (ref 0.6–1.2)

## 2021-06-08 PROCEDURE — 80178 ASSAY OF LITHIUM: CPT

## 2021-06-08 PROCEDURE — 36415 COLL VENOUS BLD VENIPUNCTURE: CPT

## 2021-06-15 ENCOUNTER — HOSPITAL ENCOUNTER (OUTPATIENT)
Dept: LAB | Facility: MEDICAL CENTER | Age: 46
End: 2021-06-15
Attending: INTERNAL MEDICINE
Payer: COMMERCIAL

## 2021-06-15 DIAGNOSIS — F31.9 BIPOLAR AFFECTIVE DISORDER, REMISSION STATUS UNSPECIFIED (HCC): ICD-10-CM

## 2021-06-15 DIAGNOSIS — N20.0 NEPHROLITHIASIS: ICD-10-CM

## 2021-06-15 DIAGNOSIS — Z79.899 LITHIUM USE: ICD-10-CM

## 2021-06-15 LAB — LITHIUM SERPL-MCNC: 0.7 MMOL/L (ref 0.6–1.2)

## 2021-06-15 PROCEDURE — 36415 COLL VENOUS BLD VENIPUNCTURE: CPT

## 2021-06-15 PROCEDURE — 80178 ASSAY OF LITHIUM: CPT

## 2021-06-15 RX ORDER — POTASSIUM CITRATE 15 MEQ/1
2 TABLET, EXTENDED RELEASE ORAL 2 TIMES DAILY
Qty: 360 TABLET | Refills: 3 | Status: SHIPPED | OUTPATIENT
Start: 2021-06-15 | End: 2022-09-28

## 2021-06-23 ENCOUNTER — HOSPITAL ENCOUNTER (OUTPATIENT)
Dept: LAB | Facility: MEDICAL CENTER | Age: 46
End: 2021-06-23
Attending: INTERNAL MEDICINE
Payer: COMMERCIAL

## 2021-06-23 DIAGNOSIS — F31.9 BIPOLAR AFFECTIVE DISORDER, REMISSION STATUS UNSPECIFIED (HCC): ICD-10-CM

## 2021-06-23 DIAGNOSIS — Z79.899 LITHIUM USE: ICD-10-CM

## 2021-06-23 PROCEDURE — 80178 ASSAY OF LITHIUM: CPT

## 2021-06-23 PROCEDURE — 36415 COLL VENOUS BLD VENIPUNCTURE: CPT

## 2021-06-24 LAB — LITHIUM SERPL-MCNC: 1 MMOL/L (ref 0.6–1.2)

## 2021-07-01 ENCOUNTER — HOSPITAL ENCOUNTER (OUTPATIENT)
Dept: LAB | Facility: MEDICAL CENTER | Age: 46
End: 2021-07-01
Attending: INTERNAL MEDICINE
Payer: COMMERCIAL

## 2021-07-01 DIAGNOSIS — Z79.899 LITHIUM USE: ICD-10-CM

## 2021-07-01 DIAGNOSIS — F31.9 BIPOLAR AFFECTIVE DISORDER, REMISSION STATUS UNSPECIFIED (HCC): ICD-10-CM

## 2021-07-01 LAB — LITHIUM SERPL-MCNC: 1.4 MMOL/L (ref 0.6–1.2)

## 2021-07-01 PROCEDURE — 36415 COLL VENOUS BLD VENIPUNCTURE: CPT

## 2021-07-01 PROCEDURE — 80178 ASSAY OF LITHIUM: CPT

## 2021-07-12 ENCOUNTER — HOSPITAL ENCOUNTER (OUTPATIENT)
Dept: LAB | Facility: MEDICAL CENTER | Age: 46
End: 2021-07-12
Attending: INTERNAL MEDICINE
Payer: COMMERCIAL

## 2021-07-12 LAB — LITHIUM SERPL-MCNC: 0.8 MMOL/L (ref 0.6–1.2)

## 2021-07-12 PROCEDURE — 36415 COLL VENOUS BLD VENIPUNCTURE: CPT

## 2021-07-12 PROCEDURE — 80178 ASSAY OF LITHIUM: CPT

## 2021-07-16 ENCOUNTER — HOSPITAL ENCOUNTER (OUTPATIENT)
Facility: MEDICAL CENTER | Age: 46
End: 2021-07-16
Attending: INTERNAL MEDICINE
Payer: COMMERCIAL

## 2021-07-16 ENCOUNTER — HOSPITAL ENCOUNTER (OUTPATIENT)
Dept: LAB | Facility: MEDICAL CENTER | Age: 46
End: 2021-07-16
Attending: INTERNAL MEDICINE
Payer: COMMERCIAL

## 2021-07-16 DIAGNOSIS — N25.1 DIABETES INSIPIDUS, NEPHROGENIC (HCC): ICD-10-CM

## 2021-07-16 DIAGNOSIS — N20.0 NEPHROLITHIASIS: ICD-10-CM

## 2021-07-16 DIAGNOSIS — N18.2 CKD (CHRONIC KIDNEY DISEASE) STAGE 2, GFR 60-89 ML/MIN: ICD-10-CM

## 2021-07-16 LAB
ALBUMIN SERPL BCP-MCNC: 4.7 G/DL (ref 3.2–4.9)
ANION GAP SERPL CALC-SCNC: 8 MMOL/L (ref 7–16)
APPEARANCE UR: CLEAR
BACTERIA #/AREA URNS HPF: ABNORMAL /HPF
BILIRUB UR QL STRIP.AUTO: NEGATIVE
BUN SERPL-MCNC: 26 MG/DL (ref 8–22)
CALCIUM SERPL-MCNC: 9.3 MG/DL (ref 8.4–10.2)
CHLORIDE SERPL-SCNC: 111 MMOL/L (ref 96–112)
CO2 SERPL-SCNC: 21 MMOL/L (ref 20–33)
COLOR UR: ABNORMAL
CREAT SERPL-MCNC: 1.2 MG/DL (ref 0.5–1.4)
CREAT UR-MCNC: 32.13 MG/DL
CREAT UR-MCNC: 32.61 MG/DL
EPI CELLS #/AREA URNS HPF: ABNORMAL /HPF
ERYTHROCYTE [DISTWIDTH] IN BLOOD BY AUTOMATED COUNT: 41.5 FL (ref 35.9–50)
GLUCOSE SERPL-MCNC: 79 MG/DL (ref 65–99)
GLUCOSE UR STRIP.AUTO-MCNC: NEGATIVE MG/DL
HCT VFR BLD AUTO: 43 % (ref 37–47)
HGB BLD-MCNC: 13.8 G/DL (ref 12–16)
KETONES UR STRIP.AUTO-MCNC: NEGATIVE MG/DL
LEUKOCYTE ESTERASE UR QL STRIP.AUTO: ABNORMAL
MCH RBC QN AUTO: 32.2 PG (ref 27–33)
MCHC RBC AUTO-ENTMCNC: 32.1 G/DL (ref 33.6–35)
MCV RBC AUTO: 100.2 FL (ref 81.4–97.8)
MICRO URNS: ABNORMAL
MICROALBUMIN UR-MCNC: <1.2 MG/DL
MICROALBUMIN/CREAT UR: NORMAL MG/G (ref 0–30)
MUCOUS THREADS #/AREA URNS HPF: ABNORMAL /HPF
NITRITE UR QL STRIP.AUTO: NEGATIVE
OSMOLALITY SERPL: 300 MOSM/KG H2O (ref 278–298)
OSMOLALITY UR: 351 MOSM/KG H2O (ref 300–900)
PH UR STRIP.AUTO: 7 [PH] (ref 5–8)
PHOSPHATE SERPL-MCNC: 3.8 MG/DL (ref 2.5–4.5)
PLATELET # BLD AUTO: 327 K/UL (ref 164–446)
PMV BLD AUTO: 9.5 FL (ref 9–12.9)
POTASSIUM SERPL-SCNC: 4.2 MMOL/L (ref 3.6–5.5)
PROT UR QL STRIP: NEGATIVE MG/DL
PROT UR-MCNC: <4 MG/DL (ref 0–15)
PROT/CREAT UR: NORMAL MG/G (ref 10–107)
RBC # BLD AUTO: 4.29 M/UL (ref 4.2–5.4)
RBC UR QL AUTO: NEGATIVE
SODIUM SERPL-SCNC: 140 MMOL/L (ref 135–145)
SP GR UR STRIP.AUTO: 1.01
WBC # BLD AUTO: 6.4 K/UL (ref 4.8–10.8)
WBC #/AREA URNS HPF: ABNORMAL /HPF

## 2021-07-16 PROCEDURE — 82436 ASSAY OF URINE CHLORIDE: CPT

## 2021-07-16 PROCEDURE — 84105 ASSAY OF URINE PHOSPHORUS: CPT

## 2021-07-16 PROCEDURE — 82340 ASSAY OF CALCIUM IN URINE: CPT

## 2021-07-16 PROCEDURE — 84300 ASSAY OF URINE SODIUM: CPT

## 2021-07-16 PROCEDURE — 84100 ASSAY OF PHOSPHORUS: CPT

## 2021-07-16 PROCEDURE — 83986 ASSAY PH BODY FLUID NOS: CPT

## 2021-07-16 PROCEDURE — 36415 COLL VENOUS BLD VENIPUNCTURE: CPT

## 2021-07-16 PROCEDURE — 82043 UR ALBUMIN QUANTITATIVE: CPT

## 2021-07-16 PROCEDURE — 82570 ASSAY OF URINE CREATININE: CPT

## 2021-07-16 PROCEDURE — 81001 URINALYSIS AUTO W/SCOPE: CPT

## 2021-07-16 PROCEDURE — 82507 ASSAY OF CITRATE: CPT

## 2021-07-16 PROCEDURE — 84156 ASSAY OF PROTEIN URINE: CPT

## 2021-07-16 PROCEDURE — 83935 ASSAY OF URINE OSMOLALITY: CPT

## 2021-07-16 PROCEDURE — 80048 BASIC METABOLIC PNL TOTAL CA: CPT

## 2021-07-16 PROCEDURE — 84560 ASSAY OF URINE/URIC ACID: CPT

## 2021-07-16 PROCEDURE — 82040 ASSAY OF SERUM ALBUMIN: CPT

## 2021-07-16 PROCEDURE — 84133 ASSAY OF URINE POTASSIUM: CPT

## 2021-07-16 PROCEDURE — 83735 ASSAY OF MAGNESIUM: CPT

## 2021-07-16 PROCEDURE — 83945 ASSAY OF OXALATE: CPT

## 2021-07-16 PROCEDURE — 85027 COMPLETE CBC AUTOMATED: CPT

## 2021-07-16 PROCEDURE — 83930 ASSAY OF BLOOD OSMOLALITY: CPT

## 2021-07-23 ENCOUNTER — OFFICE VISIT (OUTPATIENT)
Dept: NEPHROLOGY | Facility: MEDICAL CENTER | Age: 46
End: 2021-07-23
Payer: COMMERCIAL

## 2021-07-23 VITALS
HEART RATE: 74 BPM | DIASTOLIC BLOOD PRESSURE: 66 MMHG | OXYGEN SATURATION: 91 % | SYSTOLIC BLOOD PRESSURE: 118 MMHG | HEIGHT: 64 IN | BODY MASS INDEX: 23.82 KG/M2 | TEMPERATURE: 98.6 F | WEIGHT: 139.5 LBS

## 2021-07-23 DIAGNOSIS — N25.1 DIABETES INSIPIDUS, NEPHROGENIC (HCC): ICD-10-CM

## 2021-07-23 DIAGNOSIS — N18.2 CKD (CHRONIC KIDNEY DISEASE) STAGE 2, GFR 60-89 ML/MIN: ICD-10-CM

## 2021-07-23 DIAGNOSIS — F31.9 BIPOLAR AFFECTIVE DISORDER, REMISSION STATUS UNSPECIFIED (HCC): ICD-10-CM

## 2021-07-23 DIAGNOSIS — N20.0 NEPHROLITHIASIS: ICD-10-CM

## 2021-07-23 PROCEDURE — 99214 OFFICE O/P EST MOD 30 MIN: CPT | Performed by: INTERNAL MEDICINE

## 2021-07-23 RX ORDER — PROGESTERONE 100 MG/1
CAPSULE ORAL
COMMUNITY
Start: 2021-05-27 | End: 2021-07-23

## 2021-07-23 RX ORDER — LITHIUM CARBONATE 300 MG
450 TABLET ORAL DAILY
Qty: 45 TABLET | Refills: 3
Start: 2021-07-23 | End: 2021-11-02 | Stop reason: SDUPTHER

## 2021-07-23 RX ORDER — TAMSULOSIN HYDROCHLORIDE 0.4 MG/1
0.4 CAPSULE ORAL DAILY
Qty: 30 CAPSULE | Refills: 3 | Status: SHIPPED | OUTPATIENT
Start: 2021-07-23 | End: 2021-08-17

## 2021-07-23 ASSESSMENT — ENCOUNTER SYMPTOMS
FLANK PAIN: 1
FEVER: 0
ABDOMINAL PAIN: 1
SHORTNESS OF BREATH: 1

## 2021-07-23 ASSESSMENT — FIBROSIS 4 INDEX: FIB4 SCORE: 0.56

## 2021-07-23 NOTE — PROGRESS NOTES
Chief Complaint   Patient presents with   • Follow-Up   • Chronic Kidney Disease         HPI:  Earnestine Lindsay is a 45 y.o. female with a history of nephrolithiasis, bipolar II disorder who presents today for follow up.     Re: nephrolithiasis, She has had more kidney stones than she can count. Her first kidney stone episode was mid-20's. She has had stones multiple times a year since her 20's. She has never had sestamibi scan.  Stones have been so severe in the past that she has needed laser lithotripsy. 24-h test results as below. She has right flank pain currently, worries it's a kidney stone. It started left flank, then around groin area and down left thigh.     Re: Bipolar disorder, she has been on lithium therapy since late 20's. Kidney stones came before lithium therapy. She remains on lithium therapy. She is having some mood troubles. Her PCP is managing her bipolar. She requests psychiatry referral.     Re: Diabetes insipidus. She is on amiloride 10mg BID. She has not had reduction in UOP. But she is able to sleep for 3-4 hours at a time when she used to wake up every 1-2 hours, but still having lots of urine output. She says it seems she can hold her bladder for more volume so she isn't urinating as frequently.      Past Medical History:   Diagnosis Date   • Anxiety    • Asthma     inhalers   • Bipolar 2 disorder (HCC)     depression , anxiety   • Bipolar disorder (HCC) 11/11/2020   • Cancer (HCC) 1996    cervical   • Hypothyroidism 11/11/2020   • Nephrolithiasis 11/11/2020   • Pain     back   • Seizure (HCC) 2013    takes po meds   • Stroke (HCC) 2013    TIA       Past Surgical History:   Procedure Laterality Date   • PB CYSTOSCOPY,INSERT URETERAL STENT Right 12/9/2020    Procedure: CYSTOSCOPY, WITH URETERAL STENT INSERTION;  Surgeon: Dorian Estrada M.D.;  Location: SURGERY [facility];  Service: Urology   • PB CYSTO/URETERO/PYELOSCOPY, DX Right 12/9/2020    Procedure: URETEROSCOPY;  Surgeon:  Dorian Estrada M.D.;  Location: SURGERY [facility];  Service: Urology   • CHOLECYSTECTOMY  2009   • ABDOMINAL HYSTERECTOMY TOTAL  2002   • EXPLORATORY LAPAROTOMY     • HAND SURGERY      3 x right hand, 1x left   • LITHOTRIPSY Right 2014 and 2015    kidney stone   • LYSIS ADHESIONS GENERAL     • OOPHORECTOMY  2003, 2004,    ovarian cysct removal         Outpatient Encounter Medications as of 7/23/2021   Medication Sig Dispense Refill   • tamsulosin (FLOMAX) 0.4 MG capsule Take 1 capsule by mouth every day. Take daily during episodes of kidney stone pain. 30 capsule 3   • lithium carbonate, IR, 300 MG Tab tablet Take 1.5 Tablets by mouth every day. 45 tablet 3   • Potassium Citrate 15 MEQ (1620 MG) Tab CR Take 2 Tablets by mouth 2 times a day. 360 tablet 3   • AMILoride (MIDAMOR) 5 MG Tab Take 2 Tablets by mouth 2 times a day. 360 tablet 3   • topiramate (TOPAMAX) 100 MG Tab      • Cetirizine HCl (ZYRTEC PO) Take  by mouth as needed.     • oxyCODONE-Acetaminophen (PERCOCET PO) Take  by mouth.     • albuterol 108 (90 Base) MCG/ACT Aero Soln inhalation aerosol 90 Puffs.     • ALPRAZolam (XANAX) 0.5 MG Tab 0.5 mg.     • benzonatate (TESSALON) 100 MG Cap 100 mg.     • butalbital-acetaminophen-caffeine-codeine (FIORICET W/CODEINE) -51-30 MG per capsule 40 Caps.     • cyclobenzaprine (FLEXERIL) 10 mg Tab 10 mg.     • dicyclomine (BENTYL) 20 MG Tab 20 mg.     • Eszopiclone 3 MG Tab 3 mg.     • levothyroxine (SYNTHROID) 75 MCG Tab 75 mcg.     • lidocaine (LIDODERM) 5 % Patch 5 Patches.     • liothyronine (CYTOMEL) 5 MCG Tab 0.005 mcg.     • ondansetron (ZOFRAN ODT) 4 MG TABLET DISPERSIBLE 4 mg.     • topiramate (TOPAMAX) 50 MG tablet Take 50 mg by mouth 2 Times a Day.     • SUMAtriptan (IMITREX) 50 MG Tab 50 mg.     • Multiple Vitamins-Minerals (MULTIVITAMIN ADULT PO) Take  by mouth.     • Cholecalciferol (VITAMIN D) 125 MCG (5000 UT) Cap Take  by mouth.     • 7-Keto DHEA Powder by Does not apply route.     •  "TESTOSTERONE AQUEOUS IM      • Spacer/Aero-Holding Chambers (AEROCHAMBER MAX W/FLOW-VU) Misc Aerochamber Plus Flow-Vu     • HYDROmorphone (DILAUDID) 4 MG Tab hydromorphone 4 mg tablet     • lamotrigine (LAMICTAL) 200 MG tablet Take 1 Tab by mouth 2 Times a Day.     • progesterone (PROMETRIUM) 100 MG Cap Take 1 Cap by mouth every day.     • [DISCONTINUED] progesterone (PROMETRIUM) 100 MG Cap  (Patient not taking: Reported on 7/23/2021)     • [DISCONTINUED] lithium carbonate, IR, 300 MG Tab tablet Take 2 Tablets by mouth every day. (Patient taking differently: Take 450 mg by mouth every day.) 60 tablet 3   • [DISCONTINUED] loratadine (CLARITIN) 10 MG Tab 10 mg. (Patient not taking: Reported on 7/23/2021)     • [DISCONTINUED] fluticasone (FLONASE) 50 MCG/ACT nasal spray fluticasone propionate 50 mcg/actuation nasal spray,suspension (Patient not taking: Reported on 7/23/2021)       No facility-administered encounter medications on file as of 7/23/2021.        Allergies   Allergen Reactions   • Promethazine Hives           Family History   Problem Relation Age of Onset   • Kidney stones Brother    • Kidney Disease Neg Hx        Review of Systems   Constitutional: Negative for fever.   Respiratory: Positive for shortness of breath (\"always\").    Cardiovascular: Negative for chest pain.   Gastrointestinal: Positive for abdominal pain.   Genitourinary: Positive for flank pain. Negative for dysuria.   All other systems reviewed and are negative.      /66 (BP Location: Right arm, Patient Position: Sitting, BP Cuff Size: Adult)   Pulse 74   Temp 37 °C (98.6 °F) (Temporal)   Ht 1.626 m (5' 4\")   Wt 63.3 kg (139 lb 8 oz)   SpO2 91%   BMI 23.95 kg/m²     Physical Exam  Constitutional:       General: She is not in acute distress.  HENT:      Mouth/Throat:      Pharynx: No oropharyngeal exudate.   Eyes:      General: No scleral icterus.  Neck:      Trachea: No tracheal deviation.   Cardiovascular:      Rate and Rhythm: " Normal rate and regular rhythm.      Heart sounds: Normal heart sounds. No murmur heard.     Pulmonary:      Effort: Pulmonary effort is normal.      Breath sounds: Normal breath sounds. No stridor. No rales.   Abdominal:      General: Bowel sounds are normal.      Palpations: Abdomen is soft.      Tenderness: There is no abdominal tenderness.   Musculoskeletal:         General: Normal range of motion.      Cervical back: Neck supple.      Right lower leg: No edema.      Left lower leg: No edema.   Skin:     General: Skin is warm and dry.      Findings: No rash.   Neurological:      Mental Status: She is alert and oriented to person, place, and time.   Psychiatric:         Mood and Affect: Mood and affect normal.           Labs reviewed.    Recent Labs     11/04/20  1611 11/04/20  1611 12/08/20  1547 04/05/21  0819 07/16/21  1059   ALBUMIN 4.3  --   --  4.6 4.7   SODIUM 140   < > 140 142 140   POTASSIUM 3.9   < > 4.1 4.4 4.2   CHLORIDE 109   < > 107 112 111   CO2 18*   < > 23 23 21   BUN 17   < > 11 20 26*   CREATININE 0.89   < > 0.95 0.94 1.20   PHOSPHORUS 3.4  --   --  3.2 3.8    < > = values in this interval not displayed.       Lab Results   Component Value Date/Time    WBC 6.4 07/16/2021 10:59 AM    RBC 4.29 07/16/2021 10:59 AM    HEMOGLOBIN 13.8 07/16/2021 10:59 AM    HEMATOCRIT 43.0 07/16/2021 10:59 AM    .2 (H) 07/16/2021 10:59 AM    MCH 32.2 07/16/2021 10:59 AM    MCHC 32.1 (L) 07/16/2021 10:59 AM    MPV 9.5 07/16/2021 10:59 AM             URINALYSIS:  Lab Results   Component Value Date/Time    COLORURINE Straw 07/16/2021 1058    CLARITY Clear 07/16/2021 1058    SPECGRAVITY 1.015 07/16/2021 1058    PHURINE 7.0 07/16/2021 1058    KETONES Negative 07/16/2021 1058    PROTEINURIN Negative 07/16/2021 1058    BILIRUBINUR Negative 07/16/2021 1058    NITRITE Negative 07/16/2021 1058    LEUKESTERAS Trace (A) 07/16/2021 1058    OCCULTBLOOD Negative 07/16/2021 1058       UPC  Lab Results   Component Value  Date/Time    TOTPROTUR <4.0 07/16/2021 1058      Lab Results   Component Value Date/Time    CREATININEU 32.13 07/16/2021 1058         Imaging reviewed  No orders to display     24-hour urine collection April 2021 with 5300 mL, high urine oxalate, low average urine citric acid, 186 mg/day calcium    Repeat 24-hour collection in July 2020 with 6400 mL, urine electrolytes remain pending.      Assessment:  Earnestine Lindsay is a 46 y.o. female with a history of nephrolithiasis, bipolar II disorder on lithium complicated by diabetes insipidus who presents today for follow up.    Plan:  1. Nephrolithiasis  -Most likely due to calcium oxalate stones given 24-hour urine results.  Continue to recommend low oxalate diet.  Continue potassium citrate 30 mEq p.o. twice daily.  Repeat urine electrolytes and minerals pending.     2. CKD (chronic kidney disease) stage 2, GFR 60-89 ml/min  -Stable.  Patient continues to hover between CKD stage II and IIIa.  CKD likely due to mild interstitial disease from long-term lithium exposure.  Continue amiloride as below.  Avoid NSAIDs and other nephrotoxins.  Low-salt diet.    3.  Diabetes insipidus.  -Likely from long-term lithium use.  Currently on amiloride 10 mg p.o. twice daily.  Patient still has polyuria, but feels she is able to hold her bladder for more urine, and urinating less frequently.  I worry the amiloride is not benefiting her much, and she has complete nephrogenic DI.  We could do a trial of desmopressin, but I do not think it would be effective.  For now, patient wishes to continue with amiloride to see if there is any benefit to after being on it for more than a few months.    4.  Bipolar disorder  -Referral to psychiatry per patient request.  Weekly lithium level ordered per patient request.      RTC 3 months with preclinic labs    Trung Schwab MD  Nephrology

## 2021-07-26 LAB
CALCIUM 24H UR-MCNC: 4.4 MG/DL
CALCIUM 24H UR-MRATE: 282 MG/D
CHLORIDE 24H UR-SCNC: 42 MMOL/L
CHLORIDE 24H UR-SRATE: 269 MMOL/D (ref 140–250)
CITRATE 24H UR-MCNC: 60 MG/L
CITRATE 24H UR-MRATE: 384 MG/D (ref 320–1240)
COLLECT DURATION TIME SPEC: 24 HRS
CREAT 24H UR-MCNC: 23 MG/DL
CREAT 24H UR-MRATE: 1472 MG/D (ref 700–1600)
MAGNESIUM 24H UR-MCNC: 3.5 MG/DL
MAGNESIUM 24H UR-MRATE: 224 MG/D (ref 12–199)
OXALATE 24H UR-MCNC: 5 MG/L
OXALATE 24H UR-MRATE: 32 MG/D (ref 13–40)
PATHOLOGY STUDY: ABNORMAL
PH UR: 7.36 [PH] (ref 5–7.5)
PHOSPHATE 24H UR-MCNC: 19 MG/DL
PHOSPHATE 24H UR-MRATE: 1216 MG/D (ref 400–1300)
POTASSIUM 24H UR-SCNC: 26 MMOL/L
POTASSIUM 24H UR-SRATE: 166 MMOL/D (ref 25–125)
SODIUM 24H UR-SCNC: 45 MMOL/L
SODIUM 24H UR-SRATE: 288 MMOL/D (ref 51–286)
TOTAL VOLUME 1105: 6400 ML
URATE 24H UR-MCNC: 10.1 MG/DL
URATE 24H UR-MRATE: 646 MG/D (ref 250–750)

## 2021-07-29 ENCOUNTER — HOSPITAL ENCOUNTER (OUTPATIENT)
Dept: LAB | Facility: MEDICAL CENTER | Age: 46
End: 2021-07-29
Attending: INTERNAL MEDICINE
Payer: COMMERCIAL

## 2021-07-29 DIAGNOSIS — F31.9 BIPOLAR AFFECTIVE DISORDER, REMISSION STATUS UNSPECIFIED (HCC): ICD-10-CM

## 2021-07-29 LAB — LITHIUM SERPL-MCNC: 0.8 MMOL/L (ref 0.6–1.2)

## 2021-07-29 PROCEDURE — 36415 COLL VENOUS BLD VENIPUNCTURE: CPT

## 2021-07-29 PROCEDURE — 80178 ASSAY OF LITHIUM: CPT

## 2021-08-11 ENCOUNTER — HOSPITAL ENCOUNTER (OUTPATIENT)
Dept: LAB | Facility: MEDICAL CENTER | Age: 46
End: 2021-08-11
Attending: INTERNAL MEDICINE
Payer: COMMERCIAL

## 2021-08-11 DIAGNOSIS — F31.9 BIPOLAR AFFECTIVE DISORDER, REMISSION STATUS UNSPECIFIED (HCC): ICD-10-CM

## 2021-08-11 LAB — LITHIUM SERPL-MCNC: 0.6 MMOL/L (ref 0.6–1.2)

## 2021-08-11 PROCEDURE — 36415 COLL VENOUS BLD VENIPUNCTURE: CPT

## 2021-08-11 PROCEDURE — 80178 ASSAY OF LITHIUM: CPT

## 2021-08-16 DIAGNOSIS — N20.0 NEPHROLITHIASIS: ICD-10-CM

## 2021-08-17 RX ORDER — TAMSULOSIN HYDROCHLORIDE 0.4 MG/1
0.4 CAPSULE ORAL DAILY
Qty: 30 CAPSULE | Refills: 3 | Status: SHIPPED | OUTPATIENT
Start: 2021-08-17 | End: 2021-11-12

## 2021-09-09 ENCOUNTER — HOSPITAL ENCOUNTER (OUTPATIENT)
Dept: LAB | Facility: MEDICAL CENTER | Age: 46
End: 2021-09-09
Attending: INTERNAL MEDICINE
Payer: COMMERCIAL

## 2021-09-09 DIAGNOSIS — F31.9 BIPOLAR AFFECTIVE DISORDER, REMISSION STATUS UNSPECIFIED (HCC): ICD-10-CM

## 2021-09-09 LAB — LITHIUM SERPL-MCNC: 0.8 MMOL/L (ref 0.6–1.2)

## 2021-09-09 PROCEDURE — 80178 ASSAY OF LITHIUM: CPT

## 2021-09-09 PROCEDURE — 36415 COLL VENOUS BLD VENIPUNCTURE: CPT

## 2021-10-08 ENCOUNTER — TELEPHONE (OUTPATIENT)
Dept: NEPHROLOGY | Facility: MEDICAL CENTER | Age: 46
End: 2021-10-08

## 2021-10-08 ENCOUNTER — HOSPITAL ENCOUNTER (OUTPATIENT)
Dept: LAB | Facility: MEDICAL CENTER | Age: 46
End: 2021-10-08
Attending: INTERNAL MEDICINE
Payer: COMMERCIAL

## 2021-10-08 DIAGNOSIS — F31.9 BIPOLAR AFFECTIVE DISORDER, REMISSION STATUS UNSPECIFIED (HCC): ICD-10-CM

## 2021-10-08 LAB — LITHIUM SERPL-MCNC: 1 MMOL/L (ref 0.6–1.2)

## 2021-10-08 PROCEDURE — 36415 COLL VENOUS BLD VENIPUNCTURE: CPT

## 2021-10-08 PROCEDURE — 80178 ASSAY OF LITHIUM: CPT

## 2021-10-08 NOTE — TELEPHONE ENCOUNTER
Please put in new lab orders.   Patients appt is [phone]    Current orders can't be used before 10/23.    Thanks

## 2021-10-11 DIAGNOSIS — N25.1 DIABETES INSIPIDUS, NEPHROGENIC (HCC): ICD-10-CM

## 2021-10-11 DIAGNOSIS — N20.0 NEPHROLITHIASIS: ICD-10-CM

## 2021-10-11 DIAGNOSIS — N18.2 CKD (CHRONIC KIDNEY DISEASE) STAGE 2, GFR 60-89 ML/MIN: ICD-10-CM

## 2021-10-12 ENCOUNTER — HOSPITAL ENCOUNTER (OUTPATIENT)
Dept: LAB | Facility: MEDICAL CENTER | Age: 46
End: 2021-10-12
Attending: INTERNAL MEDICINE
Payer: COMMERCIAL

## 2021-10-12 ENCOUNTER — OFFICE VISIT (OUTPATIENT)
Dept: BEHAVIORAL HEALTH | Facility: CLINIC | Age: 46
End: 2021-10-12
Payer: COMMERCIAL

## 2021-10-12 DIAGNOSIS — Z79.899 ENCOUNTER FOR LONG-TERM (CURRENT) USE OF HIGH-RISK MEDICATION: ICD-10-CM

## 2021-10-12 DIAGNOSIS — N25.1 DIABETES INSIPIDUS, NEPHROGENIC (HCC): ICD-10-CM

## 2021-10-12 DIAGNOSIS — F31.62 BIPOLAR DISORDER, CURRENT EPISODE MIXED, MODERATE (HCC): ICD-10-CM

## 2021-10-12 DIAGNOSIS — N20.0 NEPHROLITHIASIS: ICD-10-CM

## 2021-10-12 DIAGNOSIS — N18.2 CKD (CHRONIC KIDNEY DISEASE) STAGE 2, GFR 60-89 ML/MIN: ICD-10-CM

## 2021-10-12 DIAGNOSIS — G40.909 SEIZURE DISORDER (HCC): ICD-10-CM

## 2021-10-12 DIAGNOSIS — F31.9 BIPOLAR AFFECTIVE DISORDER, REMISSION STATUS UNSPECIFIED (HCC): ICD-10-CM

## 2021-10-12 LAB
25(OH)D3 SERPL-MCNC: 71 NG/ML (ref 30–100)
ALBUMIN SERPL BCP-MCNC: 5.1 G/DL (ref 3.2–4.9)
ANION GAP SERPL CALC-SCNC: 11 MMOL/L (ref 7–16)
APPEARANCE UR: CLEAR
BACTERIA #/AREA URNS HPF: NEGATIVE /HPF
BILIRUB UR QL STRIP.AUTO: NEGATIVE
BUN SERPL-MCNC: 13 MG/DL (ref 8–22)
CALCIUM SERPL-MCNC: 9.9 MG/DL (ref 8.4–10.2)
CHLORIDE SERPL-SCNC: 107 MMOL/L (ref 96–112)
CO2 SERPL-SCNC: 21 MMOL/L (ref 20–33)
COLOR UR: YELLOW
CREAT SERPL-MCNC: 1.36 MG/DL (ref 0.5–1.4)
CREAT UR-MCNC: 105.16 MG/DL
CREAT UR-MCNC: 109.96 MG/DL
EPI CELLS #/AREA URNS HPF: NEGATIVE /HPF
ERYTHROCYTE [DISTWIDTH] IN BLOOD BY AUTOMATED COUNT: 43.4 FL (ref 35.9–50)
FASTING STATUS PATIENT QL REPORTED: NORMAL
GLUCOSE SERPL-MCNC: 83 MG/DL (ref 65–99)
GLUCOSE UR STRIP.AUTO-MCNC: NEGATIVE MG/DL
HCT VFR BLD AUTO: 44.9 % (ref 37–47)
HGB BLD-MCNC: 14.6 G/DL (ref 12–16)
HYALINE CASTS #/AREA URNS LPF: NORMAL /LPF
KETONES UR STRIP.AUTO-MCNC: NEGATIVE MG/DL
LEUKOCYTE ESTERASE UR QL STRIP.AUTO: ABNORMAL
MCH RBC QN AUTO: 31.9 PG (ref 27–33)
MCHC RBC AUTO-ENTMCNC: 32.5 G/DL (ref 33.6–35)
MCV RBC AUTO: 98 FL (ref 81.4–97.8)
MICRO URNS: ABNORMAL
MICROALBUMIN UR-MCNC: <1.2 MG/DL
MICROALBUMIN/CREAT UR: NORMAL MG/G (ref 0–30)
NITRITE UR QL STRIP.AUTO: NEGATIVE
OSMOLALITY SERPL: 293 MOSM/KG H2O (ref 278–298)
OSMOLALITY UR: 506 MOSM/KG H2O (ref 300–900)
PH UR STRIP.AUTO: 7.5 [PH] (ref 5–8)
PHOSPHATE SERPL-MCNC: 3.3 MG/DL (ref 2.5–4.5)
PLATELET # BLD AUTO: 357 K/UL (ref 164–446)
PMV BLD AUTO: 9.9 FL (ref 9–12.9)
POTASSIUM SERPL-SCNC: 4.3 MMOL/L (ref 3.6–5.5)
PROT UR QL STRIP: NEGATIVE MG/DL
PROT UR-MCNC: 7 MG/DL (ref 0–15)
PROT/CREAT UR: 67 MG/G (ref 10–107)
PTH-INTACT SERPL-MCNC: 42.2 PG/ML (ref 14–72)
RBC # BLD AUTO: 4.58 M/UL (ref 4.2–5.4)
RBC # URNS HPF: NORMAL /HPF
RBC UR QL AUTO: NEGATIVE
SODIUM SERPL-SCNC: 139 MMOL/L (ref 135–145)
SP GR UR STRIP.AUTO: 1.01
WBC # BLD AUTO: 4.4 K/UL (ref 4.8–10.8)
WBC #/AREA URNS HPF: NORMAL /HPF

## 2021-10-12 PROCEDURE — 85027 COMPLETE CBC AUTOMATED: CPT

## 2021-10-12 PROCEDURE — 80178 ASSAY OF LITHIUM: CPT

## 2021-10-12 PROCEDURE — 96127 BRIEF EMOTIONAL/BEHAV ASSMT: CPT | Performed by: PSYCHIATRY & NEUROLOGY

## 2021-10-12 PROCEDURE — 82570 ASSAY OF URINE CREATININE: CPT | Mod: 91

## 2021-10-12 PROCEDURE — 84156 ASSAY OF PROTEIN URINE: CPT

## 2021-10-12 PROCEDURE — 81001 URINALYSIS AUTO W/SCOPE: CPT

## 2021-10-12 PROCEDURE — 83970 ASSAY OF PARATHORMONE: CPT

## 2021-10-12 PROCEDURE — 99205 OFFICE O/P NEW HI 60 MIN: CPT | Performed by: PSYCHIATRY & NEUROLOGY

## 2021-10-12 PROCEDURE — 83935 ASSAY OF URINE OSMOLALITY: CPT

## 2021-10-12 PROCEDURE — 83930 ASSAY OF BLOOD OSMOLALITY: CPT

## 2021-10-12 PROCEDURE — 82043 UR ALBUMIN QUANTITATIVE: CPT

## 2021-10-12 PROCEDURE — 84100 ASSAY OF PHOSPHORUS: CPT

## 2021-10-12 PROCEDURE — 80048 BASIC METABOLIC PNL TOTAL CA: CPT

## 2021-10-12 PROCEDURE — 36415 COLL VENOUS BLD VENIPUNCTURE: CPT

## 2021-10-12 PROCEDURE — 82306 VITAMIN D 25 HYDROXY: CPT

## 2021-10-12 PROCEDURE — 82040 ASSAY OF SERUM ALBUMIN: CPT

## 2021-10-12 ASSESSMENT — ANXIETY QUESTIONNAIRES
5. BEING SO RESTLESS THAT IT IS HARD TO SIT STILL: MORE THAN HALF THE DAYS
4. TROUBLE RELAXING: NEARLY EVERY DAY
6. BECOMING EASILY ANNOYED OR IRRITABLE: NEARLY EVERY DAY
3. WORRYING TOO MUCH ABOUT DIFFERENT THINGS: SEVERAL DAYS
GAD7 TOTAL SCORE: 11
7. FEELING AFRAID AS IF SOMETHING AWFUL MIGHT HAPPEN: NOT AT ALL
IF YOU CHECKED OFF ANY PROBLEMS ON THIS QUESTIONNAIRE, HOW DIFFICULT HAVE THESE PROBLEMS MADE IT FOR YOU TO DO YOUR WORK, TAKE CARE OF THINGS AT HOME, OR GET ALONG WITH OTHER PEOPLE: SOMEWHAT DIFFICULT
2. NOT BEING ABLE TO STOP OR CONTROL WORRYING: NOT AT ALL
1. FEELING NERVOUS, ANXIOUS, OR ON EDGE: MORE THAN HALF THE DAYS

## 2021-10-12 ASSESSMENT — PATIENT HEALTH QUESTIONNAIRE - PHQ9
SUM OF ALL RESPONSES TO PHQ QUESTIONS 1-9: 13
CLINICAL INTERPRETATION OF PHQ2 SCORE: 3
5. POOR APPETITE OR OVEREATING: 1 - SEVERAL DAYS
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
## 2021-10-13 LAB — LITHIUM SERPL-MCNC: 0.8 MMOL/L (ref 0.6–1.2)

## 2021-10-14 ENCOUNTER — HOSPITAL ENCOUNTER (OUTPATIENT)
Facility: MEDICAL CENTER | Age: 46
End: 2021-10-14
Attending: INTERNAL MEDICINE
Payer: COMMERCIAL

## 2021-10-14 PROCEDURE — 83735 ASSAY OF MAGNESIUM: CPT

## 2021-10-14 PROCEDURE — 82436 ASSAY OF URINE CHLORIDE: CPT

## 2021-10-14 PROCEDURE — 82340 ASSAY OF CALCIUM IN URINE: CPT

## 2021-10-14 PROCEDURE — 84133 ASSAY OF URINE POTASSIUM: CPT

## 2021-10-14 PROCEDURE — 84300 ASSAY OF URINE SODIUM: CPT

## 2021-10-14 PROCEDURE — 84105 ASSAY OF URINE PHOSPHORUS: CPT

## 2021-10-14 PROCEDURE — 83986 ASSAY PH BODY FLUID NOS: CPT

## 2021-10-14 PROCEDURE — 82507 ASSAY OF CITRATE: CPT

## 2021-10-14 PROCEDURE — 83945 ASSAY OF OXALATE: CPT

## 2021-10-14 PROCEDURE — 84560 ASSAY OF URINE/URIC ACID: CPT

## 2021-10-14 PROCEDURE — 82570 ASSAY OF URINE CREATININE: CPT

## 2021-10-24 LAB
CALCIUM 24H UR-MCNC: 6.8 MG/DL
CALCIUM 24H UR-MRATE: 374 MG/D
CHLORIDE 24H UR-SCNC: 56 MMOL/L
CHLORIDE 24H UR-SRATE: 308 MMOL/D (ref 140–250)
CITRATE 24H UR-MCNC: 78 MG/L
CITRATE 24H UR-MRATE: 429 MG/D (ref 320–1240)
COLLECT DURATION TIME SPEC: 24 HRS
CREAT 24H UR-MCNC: 27 MG/DL
CREAT 24H UR-MRATE: 1485 MG/D (ref 700–1600)
MAGNESIUM 24H UR-MCNC: 4.6 MG/DL
MAGNESIUM 24H UR-MRATE: 253 MG/D (ref 12–199)
OXALATE 24H UR-MCNC: 8 MG/L
OXALATE 24H UR-MRATE: 44 MG/D (ref 13–40)
PATHOLOGY STUDY: ABNORMAL
PH UR: 7.62 [PH] (ref 5–7.5)
PHOSPHATE 24H UR-MCNC: 14 MG/DL
PHOSPHATE 24H UR-MRATE: 770 MG/D (ref 400–1300)
POTASSIUM 24H UR-SCNC: 25 MMOL/L
POTASSIUM 24H UR-SRATE: 138 MMOL/D (ref 25–125)
SODIUM 24H UR-SCNC: 64 MMOL/L
SODIUM 24H UR-SRATE: 352 MMOL/D (ref 51–286)
TOTAL VOLUME 1105: 5500 ML
URATE 24H UR-MCNC: 8.5 MG/DL
URATE 24H UR-MRATE: 468 MG/D (ref 250–750)

## 2021-10-26 ENCOUNTER — OFFICE VISIT (OUTPATIENT)
Dept: NEPHROLOGY | Facility: MEDICAL CENTER | Age: 46
End: 2021-10-26
Payer: COMMERCIAL

## 2021-10-26 VITALS
BODY MASS INDEX: 24.04 KG/M2 | WEIGHT: 140.8 LBS | SYSTOLIC BLOOD PRESSURE: 110 MMHG | HEART RATE: 75 BPM | OXYGEN SATURATION: 100 % | HEIGHT: 64 IN | RESPIRATION RATE: 20 BRPM | TEMPERATURE: 97.7 F | DIASTOLIC BLOOD PRESSURE: 68 MMHG

## 2021-10-26 DIAGNOSIS — N20.0 NEPHROLITHIASIS: ICD-10-CM

## 2021-10-26 DIAGNOSIS — N25.1 DIABETES INSIPIDUS, NEPHROGENIC (HCC): ICD-10-CM

## 2021-10-26 DIAGNOSIS — N18.2 CKD (CHRONIC KIDNEY DISEASE) STAGE 2, GFR 60-89 ML/MIN: ICD-10-CM

## 2021-10-26 DIAGNOSIS — F31.9 BIPOLAR AFFECTIVE DISORDER, REMISSION STATUS UNSPECIFIED (HCC): ICD-10-CM

## 2021-10-26 PROCEDURE — 99214 OFFICE O/P EST MOD 30 MIN: CPT | Performed by: INTERNAL MEDICINE

## 2021-10-26 RX ORDER — PROGESTERONE 100 MG/1
100 CAPSULE ORAL
COMMUNITY
Start: 2021-08-03 | End: 2021-10-26

## 2021-10-26 RX ORDER — TOPIRAMATE 100 MG/1
100 TABLET, FILM COATED ORAL 2 TIMES DAILY
Qty: 60 TABLET | Refills: 3
Start: 2021-10-26 | End: 2023-02-06

## 2021-10-26 ASSESSMENT — ENCOUNTER SYMPTOMS
SHORTNESS OF BREATH: 1
ABDOMINAL PAIN: 0
FEVER: 0

## 2021-10-26 ASSESSMENT — FIBROSIS 4 INDEX: FIB4 SCORE: 0.52

## 2021-10-26 NOTE — PROGRESS NOTES
Chief Complaint   Patient presents with   • Follow-Up   • Chronic Kidney Disease         HPI:  Earnestine Lindsay is a 46 y.o. female with a history of nephrolithiasis, bipolar II disorder who presents today for follow up.     Re: nephrolithiasis, She has had more kidney stones than she can count. Her first kidney stone episode was mid-20's. She has had stones multiple times a year since her 20's. She has never had sestamibi scan.  Stones have been so severe in the past that she has needed laser lithotripsy. 24-h test results as below. She says she is in pain on a daily basis, isn't sure if she is passing stones. She is taking K-citrate 30 mEq PO BID.     Re: Bipolar disorder, she has been on lithium therapy since late 20's. Kidney stones came before lithium therapy. She has seen a psychiatrist and is trying to get off lithium. She is now on Vraylar (Cariprazine).     Re: Diabetes insipidus. She ream ins on amiloride 10mg BID. She has had mild reduction in UOP and she is able to sleep with only 1x nocturia. She has urinary frequency during the day.         Past Medical History:   Diagnosis Date   • Anxiety    • Asthma     inhalers   • Bipolar 2 disorder (HCC)     depression , anxiety   • Bipolar disorder (HCC) 11/11/2020   • Cancer (HCC) 1996    cervical   • Hypothyroidism 11/11/2020   • Nephrolithiasis 11/11/2020   • Pain     back   • Seizure (HCC) 2013    takes po meds   • Stroke (HCC) 2013    TIA       Past Surgical History:   Procedure Laterality Date   • PB CYSTOSCOPY,INSERT URETERAL STENT Right 12/9/2020    Procedure: CYSTOSCOPY, WITH URETERAL STENT INSERTION;  Surgeon: Dorian Estrada M.D.;  Location: SURGERY [facility];  Service: Urology   • PB CYSTO/URETERO/PYELOSCOPY, DX Right 12/9/2020    Procedure: URETEROSCOPY;  Surgeon: Dorian Estrada M.D.;  Location: SURGERY [facility];  Service: Urology   • CHOLECYSTECTOMY  2009   • ABDOMINAL HYSTERECTOMY TOTAL  2002   • EXPLORATORY LAPAROTOMY     • HAND  SURGERY      3 x right hand, 1x left   • LITHOTRIPSY Right 2014 and 2015    kidney stone   • LYSIS ADHESIONS GENERAL     • OOPHORECTOMY  2003, 2004,    ovarian cysct removal         Outpatient Encounter Medications as of 10/26/2021   Medication Sig Dispense Refill   • topiramate (TOPAMAX) 100 MG Tab 1 Tablet 2 times a day. 60 Tablet 3   • Cariprazine HCl 1.5 MG Cap Take 1.5 mg by mouth 1/2 hour after dinner. 30 Capsule 0   • tamsulosin (FLOMAX) 0.4 MG capsule TAKE 1 CAPSULE BY MOUTH EVERY DAY. TAKE DAILY DURING EPISODES OF KIDNEY STONE PAIN. 30 Capsule 3   • lithium carbonate, IR, 300 MG Tab tablet Take 1.5 Tablets by mouth every day. 45 tablet 3   • Potassium Citrate 15 MEQ (1620 MG) Tab CR Take 2 Tablets by mouth 2 times a day. 360 tablet 3   • AMILoride (MIDAMOR) 5 MG Tab Take 2 Tablets by mouth 2 times a day. 360 tablet 3   • Cetirizine HCl (ZYRTEC PO) Take  by mouth as needed.     • oxyCODONE-Acetaminophen (PERCOCET PO) Take  by mouth.     • albuterol 108 (90 Base) MCG/ACT Aero Soln inhalation aerosol 90 Puffs.     • ALPRAZolam (XANAX) 0.5 MG Tab 0.5 mg.     • benzonatate (TESSALON) 100 MG Cap 100 mg.     • butalbital-acetaminophen-caffeine-codeine (FIORICET W/CODEINE) -19-30 MG per capsule 40 Caps.     • cyclobenzaprine (FLEXERIL) 10 mg Tab 10 mg.     • dicyclomine (BENTYL) 20 MG Tab 20 mg.     • levothyroxine (SYNTHROID) 75 MCG Tab 75 mcg.     • lidocaine (LIDODERM) 5 % Patch 5 Patches.     • liothyronine (CYTOMEL) 5 MCG Tab 0.005 mcg.     • ondansetron (ZOFRAN ODT) 4 MG TABLET DISPERSIBLE 4 mg.     • SUMAtriptan (IMITREX) 50 MG Tab 50 mg.     • Cholecalciferol (VITAMIN D) 125 MCG (5000 UT) Cap Take  by mouth.     • TESTOSTERONE AQUEOUS IM      • Spacer/Aero-Holding Chambers (AEROCHAMBER MAX W/FLOW-VU) Misc Aerochamber Plus Flow-Vu     • HYDROmorphone (DILAUDID) 4 MG Tab hydromorphone 4 mg tablet     • lamotrigine (LAMICTAL) 200 MG tablet Take 1 Tab by mouth 2 Times a Day.     • progesterone  "(PROMETRIUM) 100 MG Cap Take 1 Cap by mouth every day.     • [DISCONTINUED] progesterone (PROMETRIUM) 100 MG Cap Take 100 mg by mouth. (Patient not taking: Reported on 10/26/2021)     • [DISCONTINUED] topiramate (TOPAMAX) 100 MG Tab      • Eszopiclone 3 MG Tab 3 mg.     • 7-Keto DHEA Powder by Does not apply route. (Patient not taking: Reported on 10/26/2021)     • [DISCONTINUED] topiramate (TOPAMAX) 50 MG tablet Take 50 mg by mouth 2 Times a Day.     • [DISCONTINUED] Multiple Vitamins-Minerals (MULTIVITAMIN ADULT PO) Take  by mouth. (Patient not taking: Reported on 10/26/2021)       No facility-administered encounter medications on file as of 10/26/2021.        Allergies   Allergen Reactions   • Promethazine Hives           Family History   Problem Relation Age of Onset   • Kidney stones Brother    • Kidney Disease Neg Hx        Review of Systems   Constitutional: Negative for fever.   Respiratory: Positive for shortness of breath (\"kind of\").    Cardiovascular: Negative for chest pain.   Gastrointestinal: Negative for abdominal pain.   Genitourinary: Positive for frequency. Negative for dysuria and hematuria.   All other systems reviewed and are negative.      /68 (BP Location: Right arm, Patient Position: Sitting)   Pulse 75   Temp 36.5 °C (97.7 °F) (Temporal)   Resp 20   Ht 1.626 m (5' 4\")   Wt 63.9 kg (140 lb 12.8 oz)   SpO2 100%   BMI 24.17 kg/m²     Physical Exam  Constitutional:       General: She is not in acute distress.  HENT:      Mouth/Throat:      Pharynx: No oropharyngeal exudate.   Eyes:      General: No scleral icterus.  Neck:      Trachea: No tracheal deviation.   Cardiovascular:      Rate and Rhythm: Normal rate and regular rhythm.      Heart sounds: Normal heart sounds. No murmur heard.     Pulmonary:      Effort: Pulmonary effort is normal.      Breath sounds: Normal breath sounds. No stridor. No rales.   Abdominal:      General: Bowel sounds are normal.      Palpations: Abdomen is " soft.      Tenderness: There is no abdominal tenderness.   Musculoskeletal:         General: Normal range of motion.      Cervical back: Neck supple.      Right lower leg: No edema.      Left lower leg: No edema.   Skin:     General: Skin is warm and dry.      Findings: No rash.   Neurological:      Mental Status: She is alert and oriented to person, place, and time.   Psychiatric:         Mood and Affect: Mood and affect normal.           Labs reviewed.    Recent Labs     04/05/21  0819 07/16/21  1059 10/12/21  0728   ALBUMIN 4.6 4.7 5.1*   SODIUM 142 140 139   POTASSIUM 4.4 4.2 4.3   CHLORIDE 112 111 107   CO2 23 21 21   BUN 20 26* 13   CREATININE 0.94 1.20 1.36   PHOSPHORUS 3.2 3.8 3.3       Lab Results   Component Value Date/Time    WBC 4.4 (L) 10/12/2021 07:28 AM    RBC 4.58 10/12/2021 07:28 AM    HEMOGLOBIN 14.6 10/12/2021 07:28 AM    HEMATOCRIT 44.9 10/12/2021 07:28 AM    MCV 98.0 (H) 10/12/2021 07:28 AM    MCH 31.9 10/12/2021 07:28 AM    MCHC 32.5 (L) 10/12/2021 07:28 AM    MPV 9.9 10/12/2021 07:28 AM             URINALYSIS:  Lab Results   Component Value Date/Time    COLORURINE Yellow 10/12/2021 0727    CLARITY Clear 10/12/2021 0727    SPECGRAVITY 1.010 10/12/2021 0727    PHURINE 7.62 (H) 10/14/2021 0530    KETONES Negative 10/12/2021 0727    PROTEINURIN Negative 10/12/2021 0727    BILIRUBINUR Negative 10/12/2021 0727    NITRITE Negative 10/12/2021 0727    LEUKESTERAS Small (A) 10/12/2021 0727    OCCULTBLOOD Negative 10/12/2021 0727       UPC  Lab Results   Component Value Date/Time    TOTPROTUR 7.0 10/12/2021 0727      Lab Results   Component Value Date/Time    CREATININEU 1485 10/14/2021 0530         Imaging reviewed  No orders to display     24-hour urine collection April 2021 with 5300 mL, high urine oxalate, low average urine citric acid, 186 mg/day calcium    Repeat 24-hour collection in July 2020 with 6400 mL, urine electrolytes remain pending.      Assessment:  Earnestine Angélica is a 46 y.o. female  with a history of nephrolithiasis, bipolar II disorder on lithium complicated by diabetes insipidus who presents today for follow up.    Plan:  1. Nephrolithiasis  -Most likely due to calcium oxalate stones given 24-hour urine results.  Continue to recommend low oxalate diet.  Continue potassium citrate 30 mEq p.o. twice daily.  If patient is transitioned off of amiloride, would likely recommend starting hydrochlorothiazide 25 mg p.o. twice daily to reduce urinary calcium.    2. 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  CKD likely due to interstitial disease from long-term lithium exposure.  Continue amiloride while patient is on lithium.  Avoid NSAIDs and other nephrotoxins.  Low-salt diet.    3.  Diabetes insipidus.  -Persistent, likely from long-term lithium use.  Continue on amiloride 10 mg p.o. twice daily while patient is taking lithium.  Her urine output decreased from 6.9 L to 5.5 L a day, so patient likely has some permanent damage.  If or when she is transitioned off of lithium, would recommend stopping amiloride, and starting hydrochlorothiazide 25 mg p.o. twice daily.    4.  Bipolar disorder  -Stable.  Continue to follow with psychiatry.  Plan to transition off of lithium in the coming months.      RTC 3 months with preclinic labs    Trung Schwab MD  Nephrology

## 2021-10-26 NOTE — PATIENT INSTRUCTIONS
Tylenol (generic name: acetaminophen) is SAFE for the kidneys. Maximum dose of tylenol is 3000mg a day. Aspirin also appears to be safe for the kidneys. Please avoid other NSAIDs (Non-Steroid Anti-Inflammatory Drugs), such as ibuprofen (brand names: Motrin, Advil), naproxen (brand name: Aleve), celecoxib (brand name: Celebrex), or even prescription NSAIDs such as meloxicam (brand name: Mobic) or indomethacin (brand name: Indocin), as they can be bad for your kidneys.

## 2021-11-02 ENCOUNTER — OFFICE VISIT (OUTPATIENT)
Dept: BEHAVIORAL HEALTH | Facility: CLINIC | Age: 46
End: 2021-11-02
Payer: COMMERCIAL

## 2021-11-02 DIAGNOSIS — F31.62 BIPOLAR DISORDER, CURRENT EPISODE MIXED, MODERATE (HCC): ICD-10-CM

## 2021-11-02 PROCEDURE — 99215 OFFICE O/P EST HI 40 MIN: CPT | Mod: GC | Performed by: STUDENT IN AN ORGANIZED HEALTH CARE EDUCATION/TRAINING PROGRAM

## 2021-11-02 RX ORDER — CARIPRAZINE 1.5 MG/1
CAPSULE, GELATIN COATED ORAL
Qty: 30 CAPSULE | Refills: 0 | Status: SHIPPED | OUTPATIENT
Start: 2021-11-02 | End: 2021-11-02

## 2021-11-02 RX ORDER — PROGESTERONE 100 MG/1
CAPSULE ORAL
COMMUNITY
Start: 2021-10-26 | End: 2022-05-13

## 2021-11-02 RX ORDER — LITHIUM CARBONATE 300 MG
450 TABLET ORAL DAILY
Qty: 45 TABLET | Refills: 3 | Status: SHIPPED | OUTPATIENT
Start: 2021-11-02 | End: 2021-12-03 | Stop reason: SDUPTHER
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
## 2021-11-12 DIAGNOSIS — N20.0 NEPHROLITHIASIS: ICD-10-CM

## 2021-11-12 RX ORDER — TAMSULOSIN HYDROCHLORIDE 0.4 MG/1
0.4 CAPSULE ORAL DAILY
Qty: 90 CAPSULE | Refills: 1 | Status: SHIPPED | OUTPATIENT
Start: 2021-11-12 | End: 2023-07-31

## 2021-12-03 ENCOUNTER — OFFICE VISIT (OUTPATIENT)
Dept: BEHAVIORAL HEALTH | Facility: CLINIC | Age: 46
End: 2021-12-03
Payer: COMMERCIAL

## 2021-12-03 DIAGNOSIS — F31.62 BIPOLAR DISORDER, CURRENT EPISODE MIXED, MODERATE (HCC): ICD-10-CM

## 2021-12-03 DIAGNOSIS — N20.0 NEPHROLITHIASIS: ICD-10-CM

## 2021-12-03 DIAGNOSIS — F31.9 BIPOLAR AFFECTIVE DISORDER, REMISSION STATUS UNSPECIFIED (HCC): ICD-10-CM

## 2021-12-03 DIAGNOSIS — N18.2 CKD (CHRONIC KIDNEY DISEASE) STAGE 2, GFR 60-89 ML/MIN: ICD-10-CM

## 2021-12-03 DIAGNOSIS — N25.1 DIABETES INSIPIDUS, NEPHROGENIC (HCC): ICD-10-CM

## 2021-12-03 DIAGNOSIS — E34.9: ICD-10-CM

## 2021-12-03 PROCEDURE — 99213 OFFICE O/P EST LOW 20 MIN: CPT | Mod: GC | Performed by: STUDENT IN AN ORGANIZED HEALTH CARE EDUCATION/TRAINING PROGRAM

## 2021-12-03 RX ORDER — LITHIUM CARBONATE 300 MG
300 TABLET ORAL DAILY
Qty: 30 TABLET | Refills: 1 | Status: SHIPPED | OUTPATIENT
Start: 2021-12-03 | End: 2022-03-02
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
## 2021-12-13 ENCOUNTER — HOSPITAL ENCOUNTER (OUTPATIENT)
Dept: LAB | Facility: MEDICAL CENTER | Age: 46
End: 2021-12-13
Attending: INTERNAL MEDICINE
Payer: COMMERCIAL

## 2021-12-13 ENCOUNTER — HOSPITAL ENCOUNTER (OUTPATIENT)
Dept: LAB | Facility: MEDICAL CENTER | Age: 46
End: 2021-12-13
Attending: STUDENT IN AN ORGANIZED HEALTH CARE EDUCATION/TRAINING PROGRAM
Payer: COMMERCIAL

## 2021-12-13 DIAGNOSIS — E34.9: ICD-10-CM

## 2021-12-13 DIAGNOSIS — Z79.899 ENCOUNTER FOR LONG-TERM (CURRENT) USE OF HIGH-RISK MEDICATION: ICD-10-CM

## 2021-12-13 DIAGNOSIS — F31.9 BIPOLAR AFFECTIVE DISORDER, REMISSION STATUS UNSPECIFIED (HCC): ICD-10-CM

## 2021-12-13 LAB
ALBUMIN SERPL BCP-MCNC: 4.9 G/DL (ref 3.2–4.9)
ANION GAP SERPL CALC-SCNC: 8 MMOL/L (ref 7–16)
APPEARANCE UR: CLEAR
BILIRUB UR QL STRIP.AUTO: NEGATIVE
BUN SERPL-MCNC: 19 MG/DL (ref 8–22)
CALCIUM SERPL-MCNC: 10.1 MG/DL (ref 8.4–10.2)
CHLORIDE SERPL-SCNC: 109 MMOL/L (ref 96–112)
CO2 SERPL-SCNC: 22 MMOL/L (ref 20–33)
COLOR UR: YELLOW
CREAT SERPL-MCNC: 1.31 MG/DL (ref 0.5–1.4)
CREAT UR-MCNC: 37.31 MG/DL
CREAT UR-MCNC: 38.71 MG/DL
ERYTHROCYTE [DISTWIDTH] IN BLOOD BY AUTOMATED COUNT: 42.1 FL (ref 35.9–50)
ESTRADIOL SERPL-MCNC: <5 PG/ML
GLUCOSE SERPL-MCNC: 94 MG/DL (ref 65–99)
GLUCOSE UR STRIP.AUTO-MCNC: NEGATIVE MG/DL
HCT VFR BLD AUTO: 48.8 % (ref 37–47)
HGB BLD-MCNC: 15.6 G/DL (ref 12–16)
KETONES UR STRIP.AUTO-MCNC: NEGATIVE MG/DL
LEUKOCYTE ESTERASE UR QL STRIP.AUTO: NEGATIVE
LITHIUM SERPL-MCNC: 0.6 MMOL/L (ref 0.6–1.2)
MCH RBC QN AUTO: 31.5 PG (ref 27–33)
MCHC RBC AUTO-ENTMCNC: 32 G/DL (ref 33.6–35)
MCV RBC AUTO: 98.6 FL (ref 81.4–97.8)
MICRO URNS: NORMAL
MICROALBUMIN UR-MCNC: <1.2 MG/DL
MICROALBUMIN/CREAT UR: NORMAL MG/G (ref 0–30)
NITRITE UR QL STRIP.AUTO: NEGATIVE
PH UR STRIP.AUTO: 7.5 [PH] (ref 5–8)
PHOSPHATE SERPL-MCNC: 2.4 MG/DL (ref 2.5–4.5)
PLATELET # BLD AUTO: 334 K/UL (ref 164–446)
PMV BLD AUTO: 9.6 FL (ref 9–12.9)
POTASSIUM SERPL-SCNC: 4.5 MMOL/L (ref 3.6–5.5)
PROGEST SERPL-MCNC: 3.91 NG/ML
PROT UR QL STRIP: NEGATIVE MG/DL
PROT UR-MCNC: <4 MG/DL (ref 0–15)
PROT/CREAT UR: NORMAL MG/G (ref 10–107)
PTH-INTACT SERPL-MCNC: 28.3 PG/ML (ref 14–72)
RBC # BLD AUTO: 4.95 M/UL (ref 4.2–5.4)
RBC UR QL AUTO: NEGATIVE
SODIUM SERPL-SCNC: 139 MMOL/L (ref 135–145)
SP GR UR STRIP.AUTO: 1.01
URATE SERPL-MCNC: 4.9 MG/DL (ref 1.9–8.2)
WBC # BLD AUTO: 6.9 K/UL (ref 4.8–10.8)

## 2021-12-13 PROCEDURE — 85027 COMPLETE CBC AUTOMATED: CPT

## 2021-12-13 PROCEDURE — 84270 ASSAY OF SEX HORMONE GLOBUL: CPT

## 2021-12-13 PROCEDURE — 82043 UR ALBUMIN QUANTITATIVE: CPT

## 2021-12-13 PROCEDURE — 84156 ASSAY OF PROTEIN URINE: CPT

## 2021-12-13 PROCEDURE — 82306 VITAMIN D 25 HYDROXY: CPT

## 2021-12-13 PROCEDURE — 82670 ASSAY OF TOTAL ESTRADIOL: CPT

## 2021-12-13 PROCEDURE — 84402 ASSAY OF FREE TESTOSTERONE: CPT

## 2021-12-13 PROCEDURE — 84479 ASSAY OF THYROID (T3 OR T4): CPT

## 2021-12-13 PROCEDURE — 82040 ASSAY OF SERUM ALBUMIN: CPT

## 2021-12-13 PROCEDURE — 81003 URINALYSIS AUTO W/O SCOPE: CPT

## 2021-12-13 PROCEDURE — 84436 ASSAY OF TOTAL THYROXINE: CPT

## 2021-12-13 PROCEDURE — 80048 BASIC METABOLIC PNL TOTAL CA: CPT

## 2021-12-13 PROCEDURE — 82570 ASSAY OF URINE CREATININE: CPT

## 2021-12-13 PROCEDURE — 84144 ASSAY OF PROGESTERONE: CPT

## 2021-12-13 PROCEDURE — 80178 ASSAY OF LITHIUM: CPT

## 2021-12-13 PROCEDURE — 83970 ASSAY OF PARATHORMONE: CPT

## 2021-12-13 PROCEDURE — 84100 ASSAY OF PHOSPHORUS: CPT

## 2021-12-13 PROCEDURE — 82679 ASSAY OF ESTRONE: CPT

## 2021-12-13 PROCEDURE — 84550 ASSAY OF BLOOD/URIC ACID: CPT

## 2021-12-13 PROCEDURE — 36415 COLL VENOUS BLD VENIPUNCTURE: CPT

## 2021-12-13 PROCEDURE — 84403 ASSAY OF TOTAL TESTOSTERONE: CPT

## 2021-12-15 LAB
25(OH)D3 SERPL-MCNC: 65 NG/ML (ref 30–80)
FT4I SERPL CALC-MCNC: 2.9 UNITS (ref 1.7–4.2)
T3RU NFR SERPL: 32 % (ref 28–41)
T4 SERPL-MCNC: 9.09 UG/DL (ref 5.1–14.1)

## 2021-12-16 ENCOUNTER — HOSPITAL ENCOUNTER (OUTPATIENT)
Facility: MEDICAL CENTER | Age: 46
End: 2021-12-16
Attending: INTERNAL MEDICINE
Payer: COMMERCIAL

## 2021-12-16 DIAGNOSIS — N20.0 NEPHROLITHIASIS: ICD-10-CM

## 2021-12-16 DIAGNOSIS — N25.1 DIABETES INSIPIDUS, NEPHROGENIC (HCC): ICD-10-CM

## 2021-12-16 LAB
ESTRONE SERPL-MCNC: 17.2 PG/ML
SHBG SERPL-SCNC: 34 NMOL/L (ref 30–135)

## 2021-12-16 PROCEDURE — 83735 ASSAY OF MAGNESIUM: CPT

## 2021-12-16 PROCEDURE — 82436 ASSAY OF URINE CHLORIDE: CPT

## 2021-12-16 PROCEDURE — 83945 ASSAY OF OXALATE: CPT

## 2021-12-16 PROCEDURE — 84133 ASSAY OF URINE POTASSIUM: CPT

## 2021-12-16 PROCEDURE — 84300 ASSAY OF URINE SODIUM: CPT

## 2021-12-16 PROCEDURE — 84105 ASSAY OF URINE PHOSPHORUS: CPT

## 2021-12-16 PROCEDURE — 82507 ASSAY OF CITRATE: CPT

## 2021-12-16 PROCEDURE — 84560 ASSAY OF URINE/URIC ACID: CPT

## 2021-12-16 PROCEDURE — 82340 ASSAY OF CALCIUM IN URINE: CPT

## 2021-12-16 PROCEDURE — 83986 ASSAY PH BODY FLUID NOS: CPT

## 2021-12-16 PROCEDURE — 82570 ASSAY OF URINE CREATININE: CPT

## 2021-12-17 LAB
TESTOST FREE SERPL-MCNC: 52.8 PG/ML (ref 1.1–5.8)
TESTOST SERPL-MCNC: 299 NG/DL (ref 9–55)

## 2022-01-04 ENCOUNTER — OFFICE VISIT (OUTPATIENT)
Dept: NEPHROLOGY | Facility: MEDICAL CENTER | Age: 47
End: 2022-01-04
Payer: COMMERCIAL

## 2022-01-04 VITALS
SYSTOLIC BLOOD PRESSURE: 114 MMHG | HEIGHT: 64 IN | HEART RATE: 73 BPM | BODY MASS INDEX: 23.28 KG/M2 | OXYGEN SATURATION: 99 % | WEIGHT: 136.38 LBS | TEMPERATURE: 97.9 F | DIASTOLIC BLOOD PRESSURE: 70 MMHG

## 2022-01-04 DIAGNOSIS — F31.9 BIPOLAR AFFECTIVE DISORDER, REMISSION STATUS UNSPECIFIED (HCC): ICD-10-CM

## 2022-01-04 DIAGNOSIS — N25.1 DIABETES INSIPIDUS, NEPHROGENIC (HCC): ICD-10-CM

## 2022-01-04 DIAGNOSIS — N18.2 CKD (CHRONIC KIDNEY DISEASE) STAGE 2, GFR 60-89 ML/MIN: ICD-10-CM

## 2022-01-04 DIAGNOSIS — N20.0 NEPHROLITHIASIS: ICD-10-CM

## 2022-01-04 PROCEDURE — 99214 OFFICE O/P EST MOD 30 MIN: CPT | Performed by: INTERNAL MEDICINE

## 2022-01-04 ASSESSMENT — ENCOUNTER SYMPTOMS
SHORTNESS OF BREATH: 1
FEVER: 0
ABDOMINAL PAIN: 0

## 2022-01-04 ASSESSMENT — FIBROSIS 4 INDEX: FIB4 SCORE: 0.55

## 2022-01-04 NOTE — PROGRESS NOTES
"Chief Complaint   Patient presents with   • Follow-Up   • Chronic Kidney Disease         HPI:  Earnestine Lindsay is a 46 y.o. female with a history of nephrolithiasis, bipolar II disorder who presents today for follow up.     Re: nephrolithiasis, She has had more kidney stones than she can count. Her first kidney stone episode was mid-20's. She has had stones multiple times a year since her 20's. She has never had sestamibi scan.  Stones have been so severe in the past that she has needed laser lithotripsy. 24-h test results as below. She says \"my kidneys are sore.\" She remains on K-citrate 30 mEq PO BID.     Re: Bipolar disorder, she has been on lithium therapy since late 20's. Kidney stones came before lithium therapy. She has seen a psychiatrist and is trying to get off lithium. But she remains on lithium for now, getting off it slowly. She is now on Vraylar (Cariprazine). She is worried she is having anxiety, tremors, and seizures/pseudoseizures.     Re: Diabetes insipidus. She remains on amiloride 10mg BID. She thinks she is sleeping better because of the amiloride, just that she has larger urine volumes.         Past Medical History:   Diagnosis Date   • Anxiety    • Asthma     inhalers   • Bipolar 2 disorder (HCC)     depression , anxiety   • Bipolar disorder (HCC) 11/11/2020   • Cancer (HCC) 1996    cervical   • Hypothyroidism 11/11/2020   • Nephrolithiasis 11/11/2020   • Pain     back   • Seizure (HCC) 2013    takes po meds   • Stroke (HCC) 2013    TIA       Past Surgical History:   Procedure Laterality Date   • PB CYSTOSCOPY,INSERT URETERAL STENT Right 12/9/2020    Procedure: CYSTOSCOPY, WITH URETERAL STENT INSERTION;  Surgeon: Dorian Estrada M.D.;  Location: SURGERY [facility];  Service: Urology   • PB CYSTO/URETERO/PYELOSCOPY, DX Right 12/9/2020    Procedure: URETEROSCOPY;  Surgeon: Dorian Estrada M.D.;  Location: [facility];  Service: Urology   • CHOLECYSTECTOMY  2009   • ABDOMINAL " HYSTERECTOMY TOTAL  2002   • EXPLORATORY LAPAROTOMY     • HAND SURGERY      3 x right hand, 1x left   • LITHOTRIPSY Right 2014 and 2015    kidney stone   • LYSIS ADHESIONS GENERAL     • OOPHORECTOMY  2003, 2004,    ovarian cysct removal         Outpatient Encounter Medications as of 1/4/2022   Medication Sig Dispense Refill   • Cariprazine HCl 3 MG Cap Take 3 mg by mouth every day. 30 Capsule 1   • lithium carbonate, IR, 300 MG Tab tablet Take 1 Tablet by mouth every day. 30 Tablet 1   • tamsulosin (FLOMAX) 0.4 MG capsule TAKE 1 CAPSULE BY MOUTH EVERY DAY. TAKE DAILY DURING EPISODES OF KIDNEY STONE PAIN. 90 Capsule 1   • progesterone (PROMETRIUM) 100 MG Cap      • topiramate (TOPAMAX) 100 MG Tab 1 Tablet 2 times a day. 60 Tablet 3   • Potassium Citrate 15 MEQ (1620 MG) Tab CR Take 2 Tablets by mouth 2 times a day. 360 tablet 3   • AMILoride (MIDAMOR) 5 MG Tab Take 2 Tablets by mouth 2 times a day. 360 tablet 3   • Cetirizine HCl (ZYRTEC PO) Take  by mouth as needed.     • oxyCODONE-Acetaminophen (PERCOCET PO) Take  by mouth.     • albuterol 108 (90 Base) MCG/ACT Aero Soln inhalation aerosol 90 Puffs.     • ALPRAZolam (XANAX) 0.5 MG Tab 0.5 mg.     • benzonatate (TESSALON) 100 MG Cap 100 mg.     • butalbital-acetaminophen-caffeine-codeine (FIORICET W/CODEINE) -64-30 MG per capsule 40 Caps.     • cyclobenzaprine (FLEXERIL) 10 mg Tab 10 mg.     • dicyclomine (BENTYL) 20 MG Tab 20 mg.     • Eszopiclone 3 MG Tab 3 mg.     • levothyroxine (SYNTHROID) 75 MCG Tab 75 mcg.     • lidocaine (LIDODERM) 5 % Patch 5 Patches.     • liothyronine (CYTOMEL) 5 MCG Tab 0.005 mcg.     • ondansetron (ZOFRAN ODT) 4 MG TABLET DISPERSIBLE 4 mg.     • SUMAtriptan (IMITREX) 50 MG Tab 50 mg.     • Cholecalciferol (VITAMIN D) 125 MCG (5000 UT) Cap Take  by mouth.     • 7-Keto DHEA Powder      • TESTOSTERONE AQUEOUS IM      • Spacer/Aero-Holding Chambers (AEROCHAMBER MAX W/FLOW-VU) Misc Aerochamber Plus Flow-Vu     • HYDROmorphone  "(DILAUDID) 4 MG Tab hydromorphone 4 mg tablet     • lamotrigine (LAMICTAL) 200 MG tablet Take 1 Tab by mouth 2 Times a Day.     • progesterone (PROMETRIUM) 100 MG Cap Take 1 Cap by mouth every day.       No facility-administered encounter medications on file as of 1/4/2022.        Allergies   Allergen Reactions   • Promethazine Hives           Family History   Problem Relation Age of Onset   • Kidney stones Brother    • Kidney Disease Neg Hx        Review of Systems   Constitutional: Negative for fever.   Respiratory: Positive for shortness of breath (\"sometimes\").    Cardiovascular: Negative for chest pain.   Gastrointestinal: Negative for abdominal pain.   Genitourinary: Positive for frequency. Negative for dysuria.   All other systems reviewed and are negative.      /70 (BP Location: Right arm, Patient Position: Sitting, BP Cuff Size: Adult)   Pulse 73   Temp 36.6 °C (97.9 °F) (Temporal)   Ht 1.626 m (5' 4\")   Wt 61.9 kg (136 lb 6 oz)   SpO2 99%   BMI 23.41 kg/m²     Physical Exam  Constitutional:       General: She is not in acute distress.  HENT:      Mouth/Throat:      Pharynx: No oropharyngeal exudate.   Eyes:      General: No scleral icterus.  Neck:      Trachea: No tracheal deviation.   Cardiovascular:      Rate and Rhythm: Normal rate and regular rhythm.      Heart sounds: Normal heart sounds. No murmur heard.      Pulmonary:      Effort: Pulmonary effort is normal.      Breath sounds: Normal breath sounds. No stridor. No rales.   Abdominal:      General: Bowel sounds are normal.      Palpations: Abdomen is soft.      Tenderness: There is no abdominal tenderness.   Musculoskeletal:         General: Normal range of motion.      Cervical back: Neck supple.      Right lower leg: No edema.      Left lower leg: No edema.   Skin:     General: Skin is warm and dry.      Findings: No rash.   Neurological:      Mental Status: She is alert and oriented to person, place, and time.   Psychiatric:         " Mood and Affect: Mood and affect normal.           Labs reviewed.    Recent Labs     07/16/21  1059 10/12/21  0728 12/13/21  0743   ALBUMIN 4.7 5.1* 4.9   SODIUM 140 139 139   POTASSIUM 4.2 4.3 4.5   CHLORIDE 111 107 109   CO2 21 21 22   BUN 26* 13 19   CREATININE 1.20 1.36 1.31   PHOSPHORUS 3.8 3.3 2.4*       Lab Results   Component Value Date/Time    WBC 6.9 12/13/2021 07:43 AM    RBC 4.95 12/13/2021 07:43 AM    HEMOGLOBIN 15.6 12/13/2021 07:43 AM    HEMATOCRIT 48.8 (H) 12/13/2021 07:43 AM    MCV 98.6 (H) 12/13/2021 07:43 AM    MCH 31.5 12/13/2021 07:43 AM    MCHC 32.0 (L) 12/13/2021 07:43 AM    MPV 9.6 12/13/2021 07:43 AM             URINALYSIS:  Lab Results   Component Value Date/Time    COLORURINE Yellow 12/13/2021 0743    CLARITY Clear 12/13/2021 0743    SPECGRAVITY 1.010 12/13/2021 0743    PHURINE 7.5 12/13/2021 0743    KETONES Negative 12/13/2021 0743    PROTEINURIN Negative 12/13/2021 0743    BILIRUBINUR Negative 12/13/2021 0743    NITRITE Negative 12/13/2021 0743    LEUKESTERAS Negative 12/13/2021 0743    OCCULTBLOOD Negative 12/13/2021 0743       UPC  Lab Results   Component Value Date/Time    TOTPROTUR <4.0 12/13/2021 0743      Lab Results   Component Value Date/Time    CREATININEU 37.31 12/13/2021 0743         Imaging reviewed  No orders to display       24-hour urine collection in December 2021 with 7000 mL of urine, electrolyte levels pending    24-hour urine collection April 2021 with 5300 mL, high urine oxalate, low average urine citric acid, 186 mg/day calcium    Repeat 24-hour collection in July 2020 with 6400 mL, urine electrolytes remain pending.      Assessment:  Earnestine Lindsay is a 46 y.o. female with a history of nephrolithiasis, bipolar II disorder on lithium complicated by diabetes insipidus who presents today for follow up.    Plan:  1. Nephrolithiasis  -Most likely due to calcium oxalate stones given 24-hour urine results. Continue to recommend low oxalate diet. Continue potassium  citrate 30 mEq p.o. twice daily. If or when patient is transitioned off of amiloride, would recommend starting hydrochlorothiazide 25 mg p.o. twice daily to reduce urinary calcium.    2. CKD (chronic kidney disease) stage 3a  -Slightly worsening, CKD likely from lithium exposure/interstitial nephritis. Continue amiloride while patient is on lithium. Avoid NSAIDs and other nephrotoxins. Low-salt diet.    3.  Diabetes insipidus.  -Persistent, likely from long-term lithium use. Continue on amiloride 10 mg p.o. twice daily while patient is taking lithium. If or when the patient is transitioned off of lithium, would recommend stopping amiloride, and start hydrochlorothiazide 25 mg p.o. twice daily.    4.  Bipolar disorder  -Stable. Continue to follow with psychiatry. Planning to transition off of lithium slowly over the next few months.      RTC 4 months with preclinic labs    Trung Schwab MD  Nephrology

## 2022-01-05 ENCOUNTER — OFFICE VISIT (OUTPATIENT)
Dept: NEUROLOGY | Facility: MEDICAL CENTER | Age: 47
End: 2022-01-05
Attending: STUDENT IN AN ORGANIZED HEALTH CARE EDUCATION/TRAINING PROGRAM
Payer: COMMERCIAL

## 2022-01-05 VITALS
TEMPERATURE: 98 F | WEIGHT: 137.7 LBS | HEART RATE: 71 BPM | DIASTOLIC BLOOD PRESSURE: 68 MMHG | BODY MASS INDEX: 23.51 KG/M2 | HEIGHT: 64 IN | SYSTOLIC BLOOD PRESSURE: 108 MMHG | OXYGEN SATURATION: 98 %

## 2022-01-05 DIAGNOSIS — R25.1 TREMOR: ICD-10-CM

## 2022-01-05 DIAGNOSIS — E03.9 HYPOTHYROIDISM, UNSPECIFIED TYPE: ICD-10-CM

## 2022-01-05 DIAGNOSIS — G43.019 INTRACTABLE MIGRAINE WITHOUT AURA AND WITHOUT STATUS MIGRAINOSUS: ICD-10-CM

## 2022-01-05 DIAGNOSIS — N25.1 DIABETES INSIPIDUS, NEPHROGENIC (HCC): ICD-10-CM

## 2022-01-05 DIAGNOSIS — F31.9 BIPOLAR AFFECTIVE DISORDER, REMISSION STATUS UNSPECIFIED (HCC): ICD-10-CM

## 2022-01-05 DIAGNOSIS — Z12.31 ENCOUNTER FOR SCREENING MAMMOGRAM FOR BREAST CANCER: ICD-10-CM

## 2022-01-05 DIAGNOSIS — N18.2 CKD (CHRONIC KIDNEY DISEASE) STAGE 2, GFR 60-89 ML/MIN: ICD-10-CM

## 2022-01-05 DIAGNOSIS — G40.909 SEIZURE DISORDER (HCC): ICD-10-CM

## 2022-01-05 DIAGNOSIS — N20.0 NEPHROLITHIASIS: ICD-10-CM

## 2022-01-05 PROCEDURE — 99205 OFFICE O/P NEW HI 60 MIN: CPT | Performed by: STUDENT IN AN ORGANIZED HEALTH CARE EDUCATION/TRAINING PROGRAM

## 2022-01-05 PROCEDURE — 99417 PROLNG OP E/M EACH 15 MIN: CPT | Performed by: STUDENT IN AN ORGANIZED HEALTH CARE EDUCATION/TRAINING PROGRAM

## 2022-01-05 PROCEDURE — 99212 OFFICE O/P EST SF 10 MIN: CPT | Performed by: STUDENT IN AN ORGANIZED HEALTH CARE EDUCATION/TRAINING PROGRAM

## 2022-01-05 RX ORDER — GALCANEZUMAB 120 MG/ML
120 INJECTION, SOLUTION SUBCUTANEOUS
Qty: 1 ML | Refills: 5 | Status: SHIPPED | OUTPATIENT
Start: 2022-01-05 | End: 2022-07-25 | Stop reason: SDUPTHER

## 2022-01-05 ASSESSMENT — PATIENT HEALTH QUESTIONNAIRE - PHQ9
5. POOR APPETITE OR OVEREATING: 0 - NOT AT ALL
SUM OF ALL RESPONSES TO PHQ QUESTIONS 1-9: 9
CLINICAL INTERPRETATION OF PHQ2 SCORE: 3

## 2022-01-05 ASSESSMENT — FIBROSIS 4 INDEX: FIB4 SCORE: 0.55

## 2022-01-05 ASSESSMENT — VISUAL ACUITY: VA_NORMAL: 1
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
## 2022-01-06 ENCOUNTER — TELEPHONE (OUTPATIENT)
Dept: NEUROLOGY | Facility: MEDICAL CENTER | Age: 47
End: 2022-01-06

## 2022-01-06 NOTE — TELEPHONE ENCOUNTER
Emgality 120mg/ml Soln PFS    PA submitted via [facility] Key: [id number] awaiting response TAT 24-72 hrs. - 01/06/2022 3:08pm

## 2022-01-10 LAB
CALCIUM 24H UR-MCNC: 5.7 MG/DL
CALCIUM 24H UR-MRATE: 399 MG/D
CHLORIDE 24H UR-SCNC: 39 MMOL/L
CHLORIDE 24H UR-SRATE: 273 MMOL/D (ref 140–250)
CITRATE 24H UR-MCNC: 49 MG/L
CITRATE 24H UR-MRATE: 343 MG/D (ref 320–1240)
COLLECT DURATION TIME SPEC: 24 HRS
CREAT 24H UR-MCNC: 25 MG/DL
CREAT 24H UR-MRATE: 1750 MG/D (ref 700–1600)
MAGNESIUM 24H UR-MCNC: 4.7 MG/DL
MAGNESIUM 24H UR-MRATE: 329 MG/D (ref 12–199)
OXALATE 24H UR-MCNC: 16 MG/L
OXALATE 24H UR-MRATE: 112 MG/D (ref 13–40)
PATHOLOGY STUDY: ABNORMAL
PH UR: 8 [PH] (ref 5–7.5)
PHOSPHATE 24H UR-MCNC: 17 MG/DL
PHOSPHATE 24H UR-MRATE: 1190 MG/D (ref 400–1300)
POTASSIUM 24H UR-SCNC: 30 MMOL/L
POTASSIUM 24H UR-SRATE: 210 MMOL/D (ref 25–125)
SODIUM 24H UR-SCNC: 46 MMOL/L
SODIUM 24H UR-SRATE: 322 MMOL/D (ref 51–286)
TOTAL VOLUME 1105: ABNORMAL ML
URATE 24H UR-MCNC: 11.1 MG/DL
URATE 24H UR-MRATE: 777 MG/D (ref 250–750)

## 2022-01-11 ENCOUNTER — PHARMACY VISIT (OUTPATIENT)
Dept: PHARMACY | Facility: MEDICAL CENTER | Age: 47
End: 2022-01-11
Payer: COMMERCIAL

## 2022-01-11 PROCEDURE — RXMED WILLOW AMBULATORY MEDICATION CHARGE: Performed by: STUDENT IN AN ORGANIZED HEALTH CARE EDUCATION/TRAINING PROGRAM

## 2022-01-20 ENCOUNTER — HOSPITAL ENCOUNTER (OUTPATIENT)
Dept: LAB | Facility: MEDICAL CENTER | Age: 47
End: 2022-01-20
Attending: STUDENT IN AN ORGANIZED HEALTH CARE EDUCATION/TRAINING PROGRAM
Payer: COMMERCIAL

## 2022-01-20 ENCOUNTER — APPOINTMENT (OUTPATIENT)
Dept: LAB | Facility: MEDICAL CENTER | Age: 47
End: 2022-01-20
Attending: FAMILY MEDICINE
Payer: COMMERCIAL

## 2022-01-20 DIAGNOSIS — G40.909 SEIZURE DISORDER (HCC): ICD-10-CM

## 2022-01-20 DIAGNOSIS — R25.1 TREMOR: ICD-10-CM

## 2022-01-20 DIAGNOSIS — E03.9 HYPOTHYROIDISM, UNSPECIFIED TYPE: ICD-10-CM

## 2022-01-20 LAB
CRP SERPL HS-MCNC: <0.3 MG/DL (ref 0–0.75)
ERYTHROCYTE [SEDIMENTATION RATE] IN BLOOD BY WESTERGREN METHOD: 4 MM/HOUR (ref 0–25)
T3FREE SERPL-MCNC: 3.05 PG/ML (ref 2–4.4)
THYROPEROXIDASE AB SERPL-ACNC: <9 IU/ML (ref 0–9)
TSH SERPL-ACNC: 0.11 UIU/ML (ref 0.35–5.5)

## 2022-01-20 PROCEDURE — 82390 ASSAY OF CERULOPLASMIN: CPT

## 2022-01-20 PROCEDURE — 80201 ASSAY OF TOPIRAMATE: CPT

## 2022-01-20 PROCEDURE — 86376 MICROSOMAL ANTIBODY EACH: CPT

## 2022-01-20 PROCEDURE — 86038 ANTINUCLEAR ANTIBODIES: CPT

## 2022-01-20 PROCEDURE — 80175 DRUG SCREEN QUAN LAMOTRIGINE: CPT

## 2022-01-20 PROCEDURE — 86140 C-REACTIVE PROTEIN: CPT

## 2022-01-20 PROCEDURE — 84481 FREE ASSAY (FT-3): CPT

## 2022-01-20 PROCEDURE — 84439 ASSAY OF FREE THYROXINE: CPT

## 2022-01-20 PROCEDURE — 36415 COLL VENOUS BLD VENIPUNCTURE: CPT

## 2022-01-20 PROCEDURE — 86235 NUCLEAR ANTIGEN ANTIBODY: CPT | Mod: 91

## 2022-01-20 PROCEDURE — 86039 ANTINUCLEAR ANTIBODIES (ANA): CPT

## 2022-01-20 PROCEDURE — 84443 ASSAY THYROID STIM HORMONE: CPT

## 2022-01-20 PROCEDURE — 85652 RBC SED RATE AUTOMATED: CPT

## 2022-01-20 PROCEDURE — 82525 ASSAY OF COPPER: CPT

## 2022-01-20 PROCEDURE — 86225 DNA ANTIBODY NATIVE: CPT

## 2022-01-21 ENCOUNTER — APPOINTMENT (OUTPATIENT)
Dept: RADIOLOGY | Facility: MEDICAL CENTER | Age: 47
End: 2022-01-21
Attending: STUDENT IN AN ORGANIZED HEALTH CARE EDUCATION/TRAINING PROGRAM
Payer: COMMERCIAL

## 2022-01-23 LAB
ANA INTERPRETIVE COMMENT Q5143: ABNORMAL
ANA PATTERN Q5144: ABNORMAL
ANA TITER Q5145: ABNORMAL
ANTINUCLEAR ANTIBODY (ANA), HEP-2, IGG Q5142: DETECTED
CERULOPLASMIN SERPL-MCNC: 20 MG/DL (ref 17–54)
COPPER SERPL-MCNC: 103.6 UG/DL (ref 80–155)
LAMOTRIGINE SERPL-MCNC: 6 UG/ML (ref 2.5–15)
NUCLEAR IGG SER QL IA: DETECTED
TOPIRAMATE SERPL-MCNC: 5.8 UG/ML (ref 5–20)

## 2022-01-24 LAB
DSDNA AB TITR SER CLIF: 0 IU (ref 0–24)
ENA JO1 AB TITR SER: 0 AU/ML (ref 0–40)
ENA SCL70 IGG SER QL: 12 AU/ML (ref 0–40)
ENA SM IGG SER-ACNC: 1 AU/ML (ref 0–40)
ENA SS-B IGG SER IA-ACNC: 0 AU/ML (ref 0–40)
SSA52 R0ENA AB IGG Q0420: 0 AU/ML (ref 0–40)
SSA60 R0ENA AB IGG Q0419: 0 AU/ML (ref 0–40)
U1 SNRNP IGG SER QL: 2 UNITS (ref 0–19)

## 2022-01-25 ENCOUNTER — APPOINTMENT (OUTPATIENT)
Dept: RADIOLOGY | Facility: MEDICAL CENTER | Age: 47
End: 2022-01-25
Attending: STUDENT IN AN ORGANIZED HEALTH CARE EDUCATION/TRAINING PROGRAM
Payer: COMMERCIAL

## 2022-01-25 DIAGNOSIS — G40.909 SEIZURE DISORDER (HCC): ICD-10-CM

## 2022-01-25 PROCEDURE — 700117 HCHG RX CONTRAST REV CODE 255: Performed by: STUDENT IN AN ORGANIZED HEALTH CARE EDUCATION/TRAINING PROGRAM

## 2022-01-25 PROCEDURE — A9576 INJ PROHANCE MULTIPACK: HCPCS | Performed by: STUDENT IN AN ORGANIZED HEALTH CARE EDUCATION/TRAINING PROGRAM

## 2022-01-25 PROCEDURE — 70553 MRI BRAIN STEM W/O & W/DYE: CPT

## 2022-01-25 RX ADMIN — GADOTERIDOL 10 ML: 279.3 INJECTION, SOLUTION INTRAVENOUS at 13:59

## 2022-02-02 ENCOUNTER — APPOINTMENT (OUTPATIENT)
Dept: BEHAVIORAL HEALTH | Facility: CLINIC | Age: 47
End: 2022-02-02
Payer: COMMERCIAL

## 2022-02-11 DIAGNOSIS — F31.62 BIPOLAR DISORDER, CURRENT EPISODE MIXED, MODERATE (HCC): ICD-10-CM

## 2022-02-11 RX ORDER — CARIPRAZINE 3 MG/1
CAPSULE, GELATIN COATED ORAL
Qty: 30 CAPSULE | Refills: 1 | Status: SHIPPED | OUTPATIENT
Start: 2022-02-11 | End: 2022-03-02

## 2022-02-22 ENCOUNTER — NON-PROVIDER VISIT (OUTPATIENT)
Dept: NEUROLOGY | Facility: MEDICAL CENTER | Age: 47
End: 2022-02-22
Attending: PSYCHIATRY & NEUROLOGY
Payer: COMMERCIAL

## 2022-02-22 DIAGNOSIS — G40.909 SEIZURE DISORDER (HCC): ICD-10-CM

## 2022-02-22 PROCEDURE — 95708 EEG WO VID EA 12-26HR UNMNTR: CPT | Performed by: STUDENT IN AN ORGANIZED HEALTH CARE EDUCATION/TRAINING PROGRAM

## 2022-02-22 PROCEDURE — 95719 EEG PHYS/QHP EA INCR W/O VID: CPT | Performed by: STUDENT IN AN ORGANIZED HEALTH CARE EDUCATION/TRAINING PROGRAM

## 2022-02-22 PROCEDURE — 95700 EEG CONT REC W/VID EEG TECH: CPT | Performed by: STUDENT IN AN ORGANIZED HEALTH CARE EDUCATION/TRAINING PROGRAM

## 2022-02-22 NOTE — PROCEDURES
AMBULATORY ELECTROENCEPHALOGRAM REPORT      Referring provider:   Dr. Brumfield      DOS: 02/22/2022       Duration of Recording: (23 hours and 16 minutes)      INDICATION:  Earnestine Lindsay 46 y.o. female presenting with a history of epileptic and non-epileptic event who is having arm and leg tremors weekly and convulsions monthly.   CURRENT OUTPATIENT MEDICATION LIST:   Current Outpatient Medications   Medication Instructions   • 7-Keto DHEA Powder Does not apply   • albuterol 108 (90 Base) MCG/ACT Aero Soln inhalation aerosol 90 Puffs   • ALPRAZolam (XANAX) 0.5 mg   • AMILoride (MIDAMOR) 10 mg, Oral, 2 TIMES DAILY   • benzonatate (TESSALON) 100 mg   • butalbital-acetaminophen-caffeine-codeine (FIORICET W/CODEINE) -31-30 MG per capsule 40 Capsules   • Cetirizine HCl (ZYRTEC PO) Oral, PRN   • Cholecalciferol (VITAMIN D) 125 MCG (5000 UT) Cap Oral   • cyclobenzaprine (FLEXERIL) 10 mg   • dicyclomine (BENTYL) 20 mg   • Eszopiclone 3 mg   • Galcanezumab-gnlm (EMGALITY) 120 MG/ML Solution Prefilled Syringe Inject 120 mg under the skin every 30 DAYS.   • HYDROmorphone (DILAUDID) 4 MG Tab hydromorphone 4 mg tablet   • lamotrigine (LAMICTAL) 200 MG tablet 1 Tablet, Oral, 2 TIMES DAILY   • levothyroxine (SYNTHROID) 75 mcg   • lidocaine (LIDODERM) 5 % Patch 5 Patches   • liothyronine (CYTOMEL) 0.005 mcg   • lithium carbonate (IR) 300 mg, Oral, DAILY   • ondansetron (ZOFRAN ODT) 4 mg   • oxyCODONE-Acetaminophen (PERCOCET PO) Oral   • Potassium Citrate 15 MEQ (1620 MG) Tab CR 2 Tablets, Oral, 2 TIMES DAILY   • progesterone (PROMETRIUM) 100 MG Cap 1 Capsule, Oral, DAILY   • progesterone (PROMETRIUM) 100 MG Cap No dose, route, or frequency recorded.   • Spacer/Aero-Holding Chambers (AEROCHAMBER MAX W/FLOW-VU) Misc Aerochamber Plus Flow-Vu   • SUMAtriptan (IMITREX) 50 mg   • tamsulosin (FLOMAX) 0.4 mg, Oral, DAILY, Take daily during episodes of kidney stone pain.   • TESTOSTERONE AQUEOUS IM No dose, route, or frequency  recorded.   • topiramate (TOPAMAX) 100 mg, 2 TIMES DAILY   • VRAYLAR 3 MG Cap TAKE 1 CAPSULE BY MOUTH EVERY DAY         TECHNIQUE: The EEG was set up and taken down by a Neurodiagnostic technologist who performed education to patient and staff.   A minimum but not limited to 23 electrodes and 23 channel recording was setup and performed by Neurodiagnostic technologist.   Impedances, electrode integrity, and technical impressions were documented a minimum of every 2-24 hour period by a Neurodiagnostic Technologist and reviewed by Interpreting Physician.    DESCRIPTION OF THE RECORD:  During maximal wakefulness, the background was continuous and showed a 9-10 Hz posterior dominant rhythm.  Reactivity and state changes were present.  During drowsiness, theta/delta frequencies were seen.    Sleep was captured and was characterized by diffuse background delta/theta activity with a loss of myogenic artifact.  N2 sleep transients in the form of sleep spindles and vertex waves were seen in the leads over the central regions.     ACTIVATION PROCEDURES:   Hyperventilation was performed by the patient for a total of 3 minutes. The technician performing the test noted good effort. This technique produced electroencephalographic changes in keeping with the expected bilaterally synchronous, frontally predominant, high amplitude slow waves build up.     Intermittent Photic stimulation was performed in a stepwise fashion from 1 to 30 Hz and elicited a normal response (photic driving), most noticeable in the posterior leads.    ICTAL AND INTERICTAL FINDINGS:   No focal or generalized epileptiform activity noted.     No regional slowing was seen during this routine study.      No seizures.    EKG: sampling of the EKG recording did not demonstrate any abnormalities    EVENTS:  Push button events and/or ambulatory diary events:     Day 1:  12:45 PM - right arm shaking approximately 10 seconds    Day 2:  Left leg shaking < 10  seconds    INTERPRETATION:  This is a normal ambulatory EEG recording in the awake and drowsy/sleep state(s):  -No persistent focal asymmetries seen.  -No definitive epileptiform discharges seen  -No seizures. Clinical correlation is recommended.   -There was/were 2 clinical events reported as described about. No definite EEG correlate to the shaking events. Clinical correlation is recommended.          Lalo Brumfield MD  Epilepsy and General Neurology  Department of Neurology  Clinical  of Neurology [facility].

## 2022-02-23 ENCOUNTER — NON-PROVIDER VISIT (OUTPATIENT)
Dept: NEUROLOGY | Facility: MEDICAL CENTER | Age: 47
End: 2022-02-23
Attending: PSYCHIATRY & NEUROLOGY
Payer: COMMERCIAL

## 2022-02-23 DIAGNOSIS — G40.909 SEIZURE DISORDER (HCC): ICD-10-CM

## 2022-02-23 PROCEDURE — 95708 EEG WO VID EA 12-26HR UNMNTR: CPT | Performed by: STUDENT IN AN ORGANIZED HEALTH CARE EDUCATION/TRAINING PROGRAM

## 2022-02-23 PROCEDURE — 95719 EEG PHYS/QHP EA INCR W/O VID: CPT | Performed by: STUDENT IN AN ORGANIZED HEALTH CARE EDUCATION/TRAINING PROGRAM

## 2022-02-23 NOTE — PROCEDURES
AMBULATORY ELECTROENCEPHALOGRAM REPORT      Referring provider:   Dr. Brumfield      DOS: 02/23/2022       Duration of Recording: (23 hours and 16 minutes)      INDICATION:  Earnestine Lindsay 46 y.o. female presenting with a history of epileptic and non-epileptic event who is having arm and leg tremors weekly and convulsions monthly.   CURRENT OUTPATIENT MEDICATION LIST:   Current Outpatient Medications   Medication Instructions   • 7-Keto DHEA Powder Does not apply   • albuterol 108 (90 Base) MCG/ACT Aero Soln inhalation aerosol 90 Puffs   • ALPRAZolam (XANAX) 0.5 mg   • AMILoride (MIDAMOR) 10 mg, Oral, 2 TIMES DAILY   • benzonatate (TESSALON) 100 mg   • butalbital-acetaminophen-caffeine-codeine (FIORICET W/CODEINE) -23-30 MG per capsule 40 Capsules   • Cetirizine HCl (ZYRTEC PO) Oral, PRN   • Cholecalciferol (VITAMIN D) 125 MCG (5000 UT) Cap Oral   • cyclobenzaprine (FLEXERIL) 10 mg   • dicyclomine (BENTYL) 20 mg   • Eszopiclone 3 mg   • Galcanezumab-gnlm (EMGALITY) 120 MG/ML Solution Prefilled Syringe Inject 120 mg under the skin every 30 DAYS.   • HYDROmorphone (DILAUDID) 4 MG Tab hydromorphone 4 mg tablet   • lamotrigine (LAMICTAL) 200 MG tablet 1 Tablet, Oral, 2 TIMES DAILY   • levothyroxine (SYNTHROID) 75 mcg   • lidocaine (LIDODERM) 5 % Patch 5 Patches   • liothyronine (CYTOMEL) 0.005 mcg   • lithium carbonate (IR) 300 mg, Oral, DAILY   • ondansetron (ZOFRAN ODT) 4 mg   • oxyCODONE-Acetaminophen (PERCOCET PO) Oral   • Potassium Citrate 15 MEQ (1620 MG) Tab CR 2 Tablets, Oral, 2 TIMES DAILY   • progesterone (PROMETRIUM) 100 MG Cap 1 Capsule, Oral, DAILY   • progesterone (PROMETRIUM) 100 MG Cap No dose, route, or frequency recorded.   • Spacer/Aero-Holding Chambers (AEROCHAMBER MAX W/FLOW-VU) Misc Aerochamber Plus Flow-Vu   • SUMAtriptan (IMITREX) 50 mg   • tamsulosin (FLOMAX) 0.4 mg, Oral, DAILY, Take daily during episodes of kidney stone pain.   • TESTOSTERONE AQUEOUS IM No dose, route, or frequency  recorded.   • topiramate (TOPAMAX) 100 mg, 2 TIMES DAILY   • VRAYLAR 3 MG Cap TAKE 1 CAPSULE BY MOUTH EVERY DAY         TECHNIQUE: The EEG was set up and taken down by a Neurodiagnostic technologist who performed education to patient and staff.   A minimum but not limited to 23 electrodes and 23 channel recording was setup and performed by Neurodiagnostic technologist.   Impedances, electrode integrity, and technical impressions were documented a minimum of every 2-24 hour period by a Neurodiagnostic Technologist and reviewed by Interpreting Physician.    DESCRIPTION OF THE RECORD:  During maximal wakefulness, the background was continuous and showed a 9-10 Hz posterior dominant rhythm.  Reactivity and state changes were present.  During drowsiness, theta/delta frequencies were seen.    Sleep was captured and was characterized by diffuse background delta/theta activity with a loss of myogenic artifact.  N2 sleep transients in the form of sleep spindles and vertex waves were seen in the leads over the central regions.     ACTIVATION PROCEDURES:   NA    ICTAL AND INTERICTAL FINDINGS:   No focal or generalized epileptiform activity noted.     No regional slowing was seen during this routine study.      No seizures.    EKG: sampling of the EKG recording did not demonstrate any abnormalities    EVENTS:  Push button events and/or ambulatory diary events: None      INTERPRETATION:  This is a normal ambulatory EEG recording in the awake and drowsy/sleep state(s):  -No persistent focal asymmetries seen.  -No definitive epileptiform discharges seen  -No seizures. Clinical correlation is recommended.   -No clinical events          Lalo Brumfield MD  Epilepsy and General Neurology  Department of Neurology  Clinical  of Neurology [facility].

## 2022-02-24 ENCOUNTER — NON-PROVIDER VISIT (OUTPATIENT)
Dept: NEUROLOGY | Facility: MEDICAL CENTER | Age: 47
End: 2022-02-24
Attending: PSYCHIATRY & NEUROLOGY
Payer: COMMERCIAL

## 2022-02-24 DIAGNOSIS — G40.909 SEIZURE DISORDER (HCC): ICD-10-CM

## 2022-02-24 PROCEDURE — 95708 EEG WO VID EA 12-26HR UNMNTR: CPT | Performed by: STUDENT IN AN ORGANIZED HEALTH CARE EDUCATION/TRAINING PROGRAM

## 2022-02-24 PROCEDURE — 95719 EEG PHYS/QHP EA INCR W/O VID: CPT | Performed by: STUDENT IN AN ORGANIZED HEALTH CARE EDUCATION/TRAINING PROGRAM

## 2022-02-24 NOTE — PROCEDURES
AMBULATORY ELECTROENCEPHALOGRAM REPORT      Referring provider:   Dr. Brumfield      DOS: 02/24/2022       Duration of Recording: (23 hours and 13 minutes)      INDICATION:  Earnestine Lindsay 46 y.o. female presenting with a history of epileptic and non-epileptic event who is having arm and leg tremors weekly and convulsions monthly.   CURRENT OUTPATIENT MEDICATION LIST:   Current Outpatient Medications   Medication Instructions   • 7-Keto DHEA Powder Does not apply   • albuterol 108 (90 Base) MCG/ACT Aero Soln inhalation aerosol 90 Puffs   • ALPRAZolam (XANAX) 0.5 mg   • AMILoride (MIDAMOR) 10 mg, Oral, 2 TIMES DAILY   • benzonatate (TESSALON) 100 mg   • butalbital-acetaminophen-caffeine-codeine (FIORICET W/CODEINE) -18-30 MG per capsule 40 Capsules   • Cetirizine HCl (ZYRTEC PO) Oral, PRN   • Cholecalciferol (VITAMIN D) 125 MCG (5000 UT) Cap Oral   • cyclobenzaprine (FLEXERIL) 10 mg   • dicyclomine (BENTYL) 20 mg   • Eszopiclone 3 mg   • Galcanezumab-gnlm (EMGALITY) 120 MG/ML Solution Prefilled Syringe Inject 120 mg under the skin every 30 DAYS.   • HYDROmorphone (DILAUDID) 4 MG Tab hydromorphone 4 mg tablet   • lamotrigine (LAMICTAL) 200 MG tablet 1 Tablet, Oral, 2 TIMES DAILY   • levothyroxine (SYNTHROID) 75 mcg   • lidocaine (LIDODERM) 5 % Patch 5 Patches   • liothyronine (CYTOMEL) 0.005 mcg   • lithium carbonate (IR) 300 mg, Oral, DAILY   • ondansetron (ZOFRAN ODT) 4 mg   • oxyCODONE-Acetaminophen (PERCOCET PO) Oral   • Potassium Citrate 15 MEQ (1620 MG) Tab CR 2 Tablets, Oral, 2 TIMES DAILY   • progesterone (PROMETRIUM) 100 MG Cap 1 Capsule, Oral, DAILY   • progesterone (PROMETRIUM) 100 MG Cap No dose, route, or frequency recorded.   • Spacer/Aero-Holding Chambers (AEROCHAMBER MAX W/FLOW-VU) Misc Aerochamber Plus Flow-Vu   • SUMAtriptan (IMITREX) 50 mg   • tamsulosin (FLOMAX) 0.4 mg, Oral, DAILY, Take daily during episodes of kidney stone pain.   • TESTOSTERONE AQUEOUS IM No dose, route, or frequency  recorded.   • topiramate (TOPAMAX) 100 mg, 2 TIMES DAILY   • VRAYLAR 3 MG Cap TAKE 1 CAPSULE BY MOUTH EVERY DAY         TECHNIQUE: The EEG was set up and taken down by a Neurodiagnostic technologist who performed education to patient and staff.   A minimum but not limited to 23 electrodes and 23 channel recording was setup and performed by Neurodiagnostic technologist.   Impedances, electrode integrity, and technical impressions were documented a minimum of every 2-24 hour period by a Neurodiagnostic Technologist and reviewed by Interpreting Physician.    DESCRIPTION OF THE RECORD:  During maximal wakefulness, the background was continuous and showed a 9-10 Hz posterior dominant rhythm.  Reactivity and state changes were present.  During drowsiness, theta/delta frequencies were seen.    Sleep was captured and was characterized by diffuse background delta/theta activity with a loss of myogenic artifact.  N2 sleep transients in the form of sleep spindles and vertex waves were seen in the leads over the central regions.     ACTIVATION PROCEDURES:   NA    ICTAL AND INTERICTAL FINDINGS:   No focal or generalized epileptiform activity noted.     No regional slowing was seen during this routine study.      No seizures.    EKG: sampling of the EKG recording did not demonstrate any abnormalities    EVENTS:  Push button events and/or ambulatory diary events:   Day 1:  9:00 AM - headache  5:45 PM - right arm shaking    NOTE: This is a technically-limited study due to abundant myogenic, movement, and lead artifact obscuring most of the recording.      INTERPRETATION:  This is a normal, technically-limited ambulatory EEG recording in the awake and drowsy/sleep state(s):  -No persistent focal asymmetries seen.  -No definitive epileptiform discharges seen  -No seizures. Clinical correlation is recommended.   -Two clinical events, one with right arm shaking, with no definite EEG correlate.          Lalo Brumfield MD  Epilepsy and  General Neurology  Department of Neurology  Clinical  of Neurology [facility].

## 2022-02-25 ENCOUNTER — NON-PROVIDER VISIT (OUTPATIENT)
Dept: NEUROLOGY | Facility: MEDICAL CENTER | Age: 47
End: 2022-02-25
Attending: PSYCHIATRY & NEUROLOGY
Payer: COMMERCIAL

## 2022-02-25 PROCEDURE — 99999 PR NO CHARGE: CPT | Performed by: STUDENT IN AN ORGANIZED HEALTH CARE EDUCATION/TRAINING PROGRAM

## 2022-02-25 NOTE — NON-PROVIDER
Skin irritation and redness noted under Fp1 and Fp2 electrodes. Small area of scabbing under F7 and F8 electrodes is present. No new areas of irritation were identified after these leads were relocated on 2/24/2022.

## 2022-03-02 ENCOUNTER — PATIENT MESSAGE (OUTPATIENT)
Dept: NEPHROLOGY | Facility: MEDICAL CENTER | Age: 47
End: 2022-03-02
Payer: COMMERCIAL

## 2022-03-02 ENCOUNTER — OFFICE VISIT (OUTPATIENT)
Dept: BEHAVIORAL HEALTH | Facility: CLINIC | Age: 47
End: 2022-03-02
Payer: COMMERCIAL

## 2022-03-02 DIAGNOSIS — F31.62 BIPOLAR DISORDER, CURRENT EPISODE MIXED, MODERATE (HCC): ICD-10-CM

## 2022-03-02 PROCEDURE — 99214 OFFICE O/P EST MOD 30 MIN: CPT | Mod: GC | Performed by: STUDENT IN AN ORGANIZED HEALTH CARE EDUCATION/TRAINING PROGRAM

## 2022-03-02 RX ORDER — CARIPRAZINE 4.5 MG/1
4.5 CAPSULE, GELATIN COATED ORAL DAILY
Qty: 30 CAPSULE | Refills: 1 | Status: SHIPPED | OUTPATIENT
Start: 2022-03-02 | End: 2022-04-05 | Stop reason: SDUPTHER

## 2022-03-02 ASSESSMENT — ANXIETY QUESTIONNAIRES
5. BEING SO RESTLESS THAT IT IS HARD TO SIT STILL: MORE THAN HALF THE DAYS
7. FEELING AFRAID AS IF SOMETHING AWFUL MIGHT HAPPEN: NOT AT ALL
4. TROUBLE RELAXING: NEARLY EVERY DAY
1. FEELING NERVOUS, ANXIOUS, OR ON EDGE: NEARLY EVERY DAY
6. BECOMING EASILY ANNOYED OR IRRITABLE: SEVERAL DAYS
GAD7 TOTAL SCORE: 15
2. NOT BEING ABLE TO STOP OR CONTROL WORRYING: NEARLY EVERY DAY
3. WORRYING TOO MUCH ABOUT DIFFERENT THINGS: NEARLY EVERY DAY

## 2022-03-02 ASSESSMENT — PATIENT HEALTH QUESTIONNAIRE - PHQ9
5. POOR APPETITE OR OVEREATING: 0 - NOT AT ALL
CLINICAL INTERPRETATION OF PHQ2 SCORE: 2
SUM OF ALL RESPONSES TO PHQ QUESTIONS 1-9: 10
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
## 2022-03-04 RX ORDER — HYDROCHLOROTHIAZIDE 25 MG/1
25 TABLET ORAL 2 TIMES DAILY
Qty: 180 TABLET | Refills: 3 | Status: SHIPPED | OUTPATIENT
Start: 2022-03-04 | End: 2023-02-13

## 2022-03-05 NOTE — PROGRESS NOTES
Lo Schwab,     I wanted to let you know that I am no longer taking lithium. I’m ready for the new treatment.      Thanks,     Earnestine         Will E-Rx HCTZ 25mg PO BID.     Trung Schwab MD  Nephrology

## 2022-03-08 ENCOUNTER — PHARMACY VISIT (OUTPATIENT)
Dept: PHARMACY | Facility: MEDICAL CENTER | Age: 47
End: 2022-03-08
Payer: COMMERCIAL

## 2022-03-08 PROCEDURE — RXMED WILLOW AMBULATORY MEDICATION CHARGE: Performed by: STUDENT IN AN ORGANIZED HEALTH CARE EDUCATION/TRAINING PROGRAM

## 2022-03-30 ENCOUNTER — OFFICE VISIT (OUTPATIENT)
Dept: NEUROLOGY | Facility: MEDICAL CENTER | Age: 47
End: 2022-03-30
Attending: STUDENT IN AN ORGANIZED HEALTH CARE EDUCATION/TRAINING PROGRAM
Payer: COMMERCIAL

## 2022-03-30 VITALS
HEIGHT: 64 IN | HEART RATE: 85 BPM | TEMPERATURE: 98.7 F | SYSTOLIC BLOOD PRESSURE: 100 MMHG | DIASTOLIC BLOOD PRESSURE: 62 MMHG | OXYGEN SATURATION: 98 % | BODY MASS INDEX: 23.39 KG/M2 | WEIGHT: 137 LBS

## 2022-03-30 DIAGNOSIS — K76.0 NONALCOHOLIC FATTY LIVER DISEASE: ICD-10-CM

## 2022-03-30 DIAGNOSIS — F31.9 BIPOLAR AFFECTIVE DISORDER, REMISSION STATUS UNSPECIFIED (HCC): ICD-10-CM

## 2022-03-30 DIAGNOSIS — M25.50 ARTHRALGIA OF MULTIPLE SITES, BILATERAL: ICD-10-CM

## 2022-03-30 DIAGNOSIS — R25.1 TREMOR: ICD-10-CM

## 2022-03-30 DIAGNOSIS — E03.9 HYPOTHYROIDISM, UNSPECIFIED TYPE: ICD-10-CM

## 2022-03-30 DIAGNOSIS — N20.0 NEPHROLITHIASIS: ICD-10-CM

## 2022-03-30 DIAGNOSIS — R76.8 ANA POSITIVE: ICD-10-CM

## 2022-03-30 DIAGNOSIS — G89.4 CHRONIC PAIN SYNDROME: ICD-10-CM

## 2022-03-30 DIAGNOSIS — G43.019 INTRACTABLE MIGRAINE WITHOUT AURA AND WITHOUT STATUS MIGRAINOSUS: ICD-10-CM

## 2022-03-30 DIAGNOSIS — N25.1 DIABETES INSIPIDUS, NEPHROGENIC (HCC): ICD-10-CM

## 2022-03-30 DIAGNOSIS — G40.909 SEIZURE DISORDER (HCC): ICD-10-CM

## 2022-03-30 DIAGNOSIS — N18.2 CKD (CHRONIC KIDNEY DISEASE) STAGE 2, GFR 60-89 ML/MIN: ICD-10-CM

## 2022-03-30 PROCEDURE — 99212 OFFICE O/P EST SF 10 MIN: CPT | Performed by: STUDENT IN AN ORGANIZED HEALTH CARE EDUCATION/TRAINING PROGRAM

## 2022-03-30 PROCEDURE — 99215 OFFICE O/P EST HI 40 MIN: CPT | Performed by: STUDENT IN AN ORGANIZED HEALTH CARE EDUCATION/TRAINING PROGRAM

## 2022-03-30 ASSESSMENT — VISUAL ACUITY: VA_NORMAL: 1

## 2022-03-30 ASSESSMENT — FIBROSIS 4 INDEX: FIB4 SCORE: 0.55

## 2022-03-30 NOTE — PROGRESS NOTES
NEUROLOGY CLINIC FOLLOW-UP - 03/30/2022   REFERRING PROVIDER  No referring provider defined for this encounter.    PCP  Edmond Thomas   [phone]   JUSTIN GILL DO [[id number]]     REASON FOR VISIT: Earnestine Lindsay 46 y.o. female presents today for follow-up. He was last seen by me 1/6/2022       INTERVAL HISTORY:      Off lithium for 3 weeks. Still having episodes. Symptoms of OCD, more clausatrophic, anxiety. Increased antipsychotic in its place which has been helping with depression.    Right arm shaking episodes still ooccurring being of lthium    Topamax 100 mg BID. Has history of kidney stones.    Rheumatologic-Has toes and hand arthralgias, also knee pain. Denies any joint stiffness in the morning. Sometimes they appear swollen. Has history of of skin rashes,     TSH low.    Emgality has significant improved frequency and severity of migraines      Has elevated MITCH pattern, homogenous. Negative ds DNA.    Patient's PMH, PSH, FH, and SH were reviewed.    ROS: All review of systems complete and are negative except as documented    CURRENT MEDICATIONS AT THE TIME OF THIS ENCOUNTER:    Current Outpatient Medications:   •  hydroCHLOROthiazide, 25 mg, Oral, BID, Taking  •  Vraylar, 4.5 mg, Oral, DAILY, Taking  •  Emgality, 120 mg, Subcutaneous, Q30 DAYS, Taking  •  tamsulosin, 0.4 mg, Oral, DAILY, Taking  •  progesterone, , Taking  •  topiramate, 100 mg, BID, Taking  •  Potassium Citrate, 2 Tablet, Oral, BID, Taking  •  Cetirizine HCl (ZYRTEC PO), Take  by mouth as needed., Taking  •  oxyCODONE-Acetaminophen (PERCOCET PO), Take  by mouth., Taking  •  albuterol, 90 Puffs., Taking  •  ALPRAZolam, 0.5 mg., Taking  •  benzonatate, 100 mg., Taking  •  butalbital-acetaminophen-caffeine-codeine, 40 Caps., Taking  •  cyclobenzaprine, 10 mg., Taking  •  dicyclomine, 20 mg., Taking  •  Eszopiclone, 3 mg., Taking  •  levothyroxine, 75 mcg., Taking  •  lidocaine, 5 Patches., Taking  •  liothyronine, 0.005  "mcg., Taking  •  ondansetron, 4 mg., Taking  •  SUMAtriptan, 50 mg., Taking  •  Vitamin D, Take  by mouth., Taking  •  7-Keto DHEA, , Taking  •  TESTOSTERONE AQUEOUS IM, , Taking  •  AeroChamber MAX w/Flow-VU, Aerochamber Plus Flow-Vu, Taking  •  HYDROmorphone, hydromorphone 4 mg tablet, Taking  •  lamotrigine, 1 Tablet, Oral, BID, Taking  •  progesterone, 1 Capsule, Oral, DAILY, Taking     EXAM:   Encounter Vitals  Standard Vitals  Vitals  Blood Pressure: 100/62  Temperature: 37.1 °C (98.7 °F)  Pulse: 85  Pulse Oximetry: 98 %  Height: 162.6 cm (5' 4\")  Weight: 62.1 kg (137 lb)  Encounter Vitals  Temperature: 37.1 °C (98.7 °F)  Blood Pressure: 100/62  Pulse: 85  Pulse Oximetry: 98 %  Weight: 62.1 kg (137 lb)  Height: 162.6 cm (5' 4\")  BMI (Calculated): 23.52  Pulmonary-Specific Vitals     Durable Medical Equipment-Specific Vitals          Physical Exam:  Physical Exam  HENT:      Head: Normocephalic and atraumatic.      Mouth/Throat:      Mouth: Mucous membranes are dry.   Eyes:      Extraocular Movements: EOM normal. No nystagmus.      Pupils: Pupils are equal, round, and reactive to light.   Pulmonary:      Effort: Pulmonary effort is normal. No respiratory distress.      Breath sounds: Normal breath sounds.   Musculoskeletal:         General: Tenderness present.   Skin:     General: Skin is warm and dry.      Coloration: Skin is not jaundiced.      Findings: Rash present.      Comments: Subtle scaly red rashes on the left forehead and right forearm   Neurological:      Mental Status: She is alert.      Deep Tendon Reflexes: Strength normal.      Reflex Scores:       Tricep reflexes are 2+ on the right side and 2+ on the left side.       Bicep reflexes are 2+ on the right side and 2+ on the left side.       Brachioradialis reflexes are 2+ on the right side and 2+ on the left side.       Patellar reflexes are 2+ on the right side and 2+ on the left side.       Achilles reflexes are 2+ on the right side and 2+ on " the left side.  Psychiatric:         Speech: Speech normal.      Comments: Flat affect. No tics, tremors        Neurological Exam   Neurological Exam  Mental Status  Alert. Speech is normal. Language is fluent with no aphasia. Attention and concentration are normal.    Cranial Nerves  CN II: Visual acuity is normal. Visual fields full to confrontation.  CN III, IV, VI: Extraocular movements intact bilaterally. No nystagmus. Normal saccades. Normal smooth pursuit. Pupils equal round and reactive to light bilaterally.   Right pupil: 3 mm. Round. Reactive to light. Reactive to accommodation.   Left pupil: 3 mm. Round. Reactive to light. Reactive to accommodation.  Relative afferent pupillary defect absent.  CN V: Facial sensation is normal.  CN VII: Full and symmetric facial movement.  CN VIII: Hearing is normal.  CN IX, X: Palate elevates symmetrically  CN XI: Shoulder shrug strength is normal.  CN XII: Tongue midline without atrophy or fasciculations.    Motor  Normal muscle bulk throughout. No fasciculations present. Normal muscle tone. No abnormal involuntary movements. Strength is 5/5 throughout all four extremities.    Sensory  Light touch is normal in upper and lower extremities.  No right-sided hemispatial neglect. No left-sided hemispatial neglect.    Reflexes                                            Right                      Left  Brachioradialis                    2+                         2+  Biceps                                 2+                         2+  Triceps                                2+                         2+  Patellar                                2+                         2+  Achilles                                2+                         2+  Plantar                           Mute                Mute    Right pathological reflexes: Ankle clonus absent.  Left pathological reflexes: Ankle clonus absent.    Coordination  Right: Finger-to-nose normal.Left: Finger-to-nose  normal.    Gait  Casual gait is normal including stance, stride, and arm swing.       ALL DATA (I.e. labs, procedures, imaging, reports, clinical notes, etc.) FROM [facility] AND/OR OUTSIDE SOURCES, IF AVAILABLE, PERSONALLY REVIEWED:       ASSESSMENT, EDUCATION, AND COUNSELING:  This is a 46 y.o. female patient who presents to the neurology clinic. We had an extensive discussion about the patient's symptoms, signs, and work-up to date, if any. We discussed potential and/or definitive diagnoses, work-up, and potential treatments.       PLAN:  If applicable, the work-up such as labs, imaging, procedures, and/or other testing, referrals, and/or recommended treatment strategies are listed below.    Visit Diagnoses     ICD-10-CM   1. MITCH positive  R76.8   2. Seizure disorder (HCC)  G40.909   3. Bipolar affective disorder, remission status unspecified ([facility])  F31.9   4. Intractable migraine without aura and without status migrainosus  G43.019   5. CKD (chronic kidney disease) stage 2, GFR 60-89 ml/min  N18.2   6. Diabetes insipidus, nephrogenic ([facility])  N25.1   7. Nephrolithiasis  N20.0   8. Hypothyroidism, unspecified type  E03.9   9. Tremor  R25.1   10. Chronic pain syndrome  G89.4   11. Nonalcoholic fatty liver disease  K76.0   12. Arthralgia of multiple sites, bilateral  M25.50        Orders Placed This Encounter   • MITCH COMPREHENSIVE PANEL   • RHEUMATOID ARTHRITIS FACTOR   • CCP-CYCLIC CITRULLINATED PEPTID   • THYROID PEROXIDASE  (TPO) AB   • ANTITHYROGLOBULIN AB      Patient with a history of seizures with right arm jerking movements. Unclear if these represent seizures, moment, do, or are psychogenic. 72 hour ambulatory EEG was unremarkable, grossly normal. She has pending EMU admission in a couple of months.     Given kidney stones, will plan to decrease topamax to 50 mg BID. We will touch base in 1 month and depending on how she is doing will consider discontinuing med altogether.     Continue emgality for migraines  which are having good benefit.    Repeat MITCH and other rheum markers in 1 month, about 12 weerks from original labs. May need to refer to rheum for further work-up (skeletal survey, derm exam, other rheum manifestations, etc). Of note sister was recently diagnosed with lupus.     TSH is low. Discussed she needs to f/u with her PCP about this as soon as possible.    F/u closely with psychiatry.      Follow-up:   • 4        BILLING DOCUMENTATION:     I spent a total of 50 minutes of face-to-face time in this visit. Over 50% of the time of the visit today was spent on counseling and/or coordination of care wtih the patient and/or family, as above in assessment in plan.    Lalo Brumfield MD  Epilepsy and General Neurology  Department of Neurology  Clinical  of Neurology [facility].

## 2022-03-31 ENCOUNTER — APPOINTMENT (RX ONLY)
Dept: URBAN - METROPOLITAN AREA CLINIC 6 | Facility: CLINIC | Age: 47
Setting detail: DERMATOLOGY
End: 2022-03-31

## 2022-03-31 DIAGNOSIS — L81.4 OTHER MELANIN HYPERPIGMENTATION: ICD-10-CM

## 2022-03-31 DIAGNOSIS — Z71.89 OTHER SPECIFIED COUNSELING: ICD-10-CM

## 2022-03-31 DIAGNOSIS — D18.0 HEMANGIOMA: ICD-10-CM

## 2022-03-31 DIAGNOSIS — L72.8 OTHER FOLLICULAR CYSTS OF THE SKIN AND SUBCUTANEOUS TISSUE: ICD-10-CM

## 2022-03-31 DIAGNOSIS — D22 MELANOCYTIC NEVI: ICD-10-CM

## 2022-03-31 DIAGNOSIS — L82.1 OTHER SEBORRHEIC KERATOSIS: ICD-10-CM

## 2022-03-31 PROBLEM — D22.62 MELANOCYTIC NEVI OF LEFT UPPER LIMB, INCLUDING SHOULDER: Status: ACTIVE | Noted: 2022-03-31

## 2022-03-31 PROBLEM — D48.5 NEOPLASM OF UNCERTAIN BEHAVIOR OF SKIN: Status: ACTIVE | Noted: 2022-03-31

## 2022-03-31 PROBLEM — D18.01 HEMANGIOMA OF SKIN AND SUBCUTANEOUS TISSUE: Status: ACTIVE | Noted: 2022-03-31

## 2022-03-31 PROBLEM — D22.72 MELANOCYTIC NEVI OF LEFT LOWER LIMB, INCLUDING HIP: Status: ACTIVE | Noted: 2022-03-31

## 2022-03-31 PROBLEM — D22.5 MELANOCYTIC NEVI OF TRUNK: Status: ACTIVE | Noted: 2022-03-31

## 2022-03-31 PROBLEM — D22.71 MELANOCYTIC NEVI OF RIGHT LOWER LIMB, INCLUDING HIP: Status: ACTIVE | Noted: 2022-03-31

## 2022-03-31 PROBLEM — D22.61 MELANOCYTIC NEVI OF RIGHT UPPER LIMB, INCLUDING SHOULDER: Status: ACTIVE | Noted: 2022-03-31

## 2022-03-31 PROCEDURE — 11104 PUNCH BX SKIN SINGLE LESION: CPT

## 2022-03-31 PROCEDURE — 99213 OFFICE O/P EST LOW 20 MIN: CPT | Mod: 25

## 2022-03-31 PROCEDURE — ? COUNSELING

## 2022-03-31 PROCEDURE — ? ADDITIONAL NOTES

## 2022-03-31 PROCEDURE — ? BIOPSY BY PUNCH METHOD

## 2022-03-31 ASSESSMENT — LOCATION DETAILED DESCRIPTION DERM
LOCATION DETAILED: RIGHT ANTECUBITAL SKIN
LOCATION DETAILED: RIGHT PROXIMAL PRETIBIAL REGION
LOCATION DETAILED: LEFT ANTERIOR DISTAL THIGH
LOCATION DETAILED: LEFT KNEE
LOCATION DETAILED: RIGHT ANTERIOR PROXIMAL UPPER ARM
LOCATION DETAILED: RIGHT ANTERIOR DISTAL UPPER ARM
LOCATION DETAILED: LEFT PROXIMAL PRETIBIAL REGION
LOCATION DETAILED: LEFT ANTERIOR PROXIMAL UPPER ARM
LOCATION DETAILED: RIGHT ANTERIOR DISTAL THIGH
LOCATION DETAILED: RIGHT KNEE
LOCATION DETAILED: LEFT VENTRAL PROXIMAL FOREARM
LOCATION DETAILED: RIGHT LATERAL PROXIMAL UPPER ARM
LOCATION DETAILED: PERIUMBILICAL SKIN
LOCATION DETAILED: LOWER STERNUM
LOCATION DETAILED: LEFT ANTERIOR DISTAL UPPER ARM
LOCATION DETAILED: EPIGASTRIC SKIN
LOCATION DETAILED: RIGHT VENTRAL PROXIMAL FOREARM

## 2022-03-31 ASSESSMENT — LOCATION SIMPLE DESCRIPTION DERM
LOCATION SIMPLE: LEFT FOREARM
LOCATION SIMPLE: RIGHT FOREARM
LOCATION SIMPLE: RIGHT THIGH
LOCATION SIMPLE: ABDOMEN
LOCATION SIMPLE: RIGHT PRETIBIAL REGION
LOCATION SIMPLE: LEFT THIGH
LOCATION SIMPLE: LEFT KNEE
LOCATION SIMPLE: CHEST
LOCATION SIMPLE: LEFT PRETIBIAL REGION
LOCATION SIMPLE: RIGHT UPPER ARM
LOCATION SIMPLE: RIGHT KNEE
LOCATION SIMPLE: LEFT UPPER ARM

## 2022-03-31 ASSESSMENT — LOCATION ZONE DERM
LOCATION ZONE: LEG
LOCATION ZONE: ARM
LOCATION ZONE: TRUNK

## 2022-03-31 NOTE — HPI: FULL BODY SKIN EXAMINATION
How Severe Are Your Spot(S)?: moderate
What Type Of Note Output Would You Prefer (Optional)?: Bullet Format
What Is The Reason For Today's Visit?: Full Body Skin Examination
What Is The Reason For Today's Visit? (Being Monitored For X): concerning skin lesions on an annual basis
Additional History: FBE.

## 2022-03-31 NOTE — PROCEDURE: BIOPSY BY PUNCH METHOD
Detail Level: Detailed
Was A Bandage Applied: Yes
Punch Size In Mm: 5
Biopsy Type: H and E
Anesthesia Type: 1% lidocaine with epinephrine
Anesthesia Volume In Cc: 0.5
Additional Anesthesia Volume In Cc (Will Not Render If 0): 0
Hemostasis: None
Epidermal Sutures: 4-0 Ethilon
Wound Care: Petrolatum
Dressing: bandage
Suture Removal: 14 days
Patient Will Remove Sutures At Home?: No
Lab: [id number]
Lab Facility: 
Consent: Written consent was obtained and risks were reviewed including but not limited to scarring, infection, bleeding, scabbing, incomplete removal, nerve damage and allergy to anesthesia.
Post-Care Instructions: I reviewed with the patient in detail post-care instructions. Patient is to keep the biopsy site dry overnight, and then apply bacitracin twice daily until healed. Patient may apply hydrogen peroxide soaks to remove any crusting.
Home Suture Removal Text: Patient was provided a home suture removal kit and will remove their sutures at home.  If they have any questions or difficulties they will call the office.
Notification Instructions: Patient will be notified of biopsy results. However, patient instructed to call the office if not contacted within 2 weeks.
Billing Type: Third-Party Bill
Information: Selecting Yes will display possible errors in your note based on the variables you have selected. This validation is only offered as a suggestion for you. PLEASE NOTE THAT THE VALIDATION TEXT WILL BE REMOVED WHEN YOU FINALIZE YOUR NOTE. IF YOU WANT TO FAX A PRELIMINARY NOTE YOU WILL NEED TO TOGGLE THIS TO 'NO' IF YOU DO NOT WANT IT IN YOUR FAXED NOTE.

## 2022-04-05 ENCOUNTER — OFFICE VISIT (OUTPATIENT)
Dept: BEHAVIORAL HEALTH | Facility: CLINIC | Age: 47
End: 2022-04-05
Payer: COMMERCIAL

## 2022-04-05 ENCOUNTER — PHARMACY VISIT (OUTPATIENT)
Dept: PHARMACY | Facility: MEDICAL CENTER | Age: 47
End: 2022-04-05
Payer: COMMERCIAL

## 2022-04-05 DIAGNOSIS — F31.62 BIPOLAR DISORDER, CURRENT EPISODE MIXED, MODERATE (HCC): ICD-10-CM

## 2022-04-05 DIAGNOSIS — F41.1 GENERALIZED ANXIETY DISORDER: ICD-10-CM

## 2022-04-05 PROCEDURE — 99214 OFFICE O/P EST MOD 30 MIN: CPT | Mod: GC | Performed by: STUDENT IN AN ORGANIZED HEALTH CARE EDUCATION/TRAINING PROGRAM

## 2022-04-05 PROCEDURE — RXMED WILLOW AMBULATORY MEDICATION CHARGE: Performed by: STUDENT IN AN ORGANIZED HEALTH CARE EDUCATION/TRAINING PROGRAM

## 2022-04-05 RX ORDER — CARIPRAZINE 4.5 MG/1
4.5 CAPSULE, GELATIN COATED ORAL DAILY
Qty: 30 CAPSULE | Refills: 1 | Status: SHIPPED | OUTPATIENT
Start: 2022-04-05 | End: 2022-05-10 | Stop reason: SDUPTHER
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
## 2022-04-14 ENCOUNTER — APPOINTMENT (RX ONLY)
Dept: URBAN - METROPOLITAN AREA CLINIC 6 | Facility: CLINIC | Age: 47
Setting detail: DERMATOLOGY
End: 2022-04-14

## 2022-04-14 DIAGNOSIS — Z48.02 ENCOUNTER FOR REMOVAL OF SUTURES: ICD-10-CM

## 2022-04-14 PROCEDURE — ? COUNSELING

## 2022-04-14 PROCEDURE — ? SUTURE REMOVAL (GLOBAL PERIOD)

## 2022-04-14 PROCEDURE — ? PHOTO-DOCUMENTATION

## 2022-04-14 ASSESSMENT — LOCATION SIMPLE DESCRIPTION DERM: LOCATION SIMPLE: RIGHT UPPER ARM

## 2022-04-14 ASSESSMENT — LOCATION ZONE DERM: LOCATION ZONE: ARM

## 2022-04-14 ASSESSMENT — LOCATION DETAILED DESCRIPTION DERM: LOCATION DETAILED: RIGHT LATERAL PROXIMAL UPPER ARM

## 2022-04-14 NOTE — PROCEDURE: SUTURE REMOVAL (GLOBAL PERIOD)
Detail Level: Detailed
Add 98998 Cpt? (Important Note: In 2017 The Use Of 00226 Is Being Tracked By Cms To Determine Future Global Period Reimbursement For Global Periods): no

## 2022-04-27 ENCOUNTER — HOSPITAL ENCOUNTER (OUTPATIENT)
Dept: LAB | Facility: MEDICAL CENTER | Age: 47
End: 2022-04-27
Attending: INTERNAL MEDICINE
Payer: COMMERCIAL

## 2022-04-27 ENCOUNTER — HOSPITAL ENCOUNTER (OUTPATIENT)
Dept: LAB | Facility: MEDICAL CENTER | Age: 47
End: 2022-04-27
Attending: STUDENT IN AN ORGANIZED HEALTH CARE EDUCATION/TRAINING PROGRAM
Payer: COMMERCIAL

## 2022-04-27 DIAGNOSIS — F31.9 BIPOLAR AFFECTIVE DISORDER, REMISSION STATUS UNSPECIFIED (HCC): ICD-10-CM

## 2022-04-27 DIAGNOSIS — N25.1 DIABETES INSIPIDUS, NEPHROGENIC (HCC): ICD-10-CM

## 2022-04-27 DIAGNOSIS — N18.2 CKD (CHRONIC KIDNEY DISEASE) STAGE 2, GFR 60-89 ML/MIN: ICD-10-CM

## 2022-04-27 DIAGNOSIS — N20.0 NEPHROLITHIASIS: ICD-10-CM

## 2022-04-27 LAB
25(OH)D3 SERPL-MCNC: 140 NG/ML (ref 30–100)
ALBUMIN SERPL BCP-MCNC: 4.8 G/DL (ref 3.2–4.9)
ANION GAP SERPL CALC-SCNC: 12 MMOL/L (ref 7–16)
APPEARANCE UR: CLEAR
BILIRUB UR QL STRIP.AUTO: NEGATIVE
BUN SERPL-MCNC: 15 MG/DL (ref 8–22)
CALCIUM SERPL-MCNC: 10.3 MG/DL (ref 8.4–10.2)
CHLORIDE SERPL-SCNC: 101 MMOL/L (ref 96–112)
CO2 SERPL-SCNC: 27 MMOL/L (ref 20–33)
COLOR UR: YELLOW
CREAT SERPL-MCNC: 1.02 MG/DL (ref 0.5–1.4)
CREAT UR-MCNC: 67.74 MG/DL
CREAT UR-MCNC: 70.54 MG/DL
EPI CELLS #/AREA URNS HPF: NORMAL /HPF
ERYTHROCYTE [DISTWIDTH] IN BLOOD BY AUTOMATED COUNT: 40.4 FL (ref 35.9–50)
FASTING STATUS PATIENT QL REPORTED: NORMAL
GFR SERPLBLD CREATININE-BSD FMLA CKD-EPI: 67 ML/MIN/1.73 M 2
GLUCOSE SERPL-MCNC: 92 MG/DL (ref 65–99)
GLUCOSE UR STRIP.AUTO-MCNC: NEGATIVE MG/DL
HCT VFR BLD AUTO: 47.6 % (ref 37–47)
HGB BLD-MCNC: 16.2 G/DL (ref 12–16)
KETONES UR STRIP.AUTO-MCNC: NEGATIVE MG/DL
LEUKOCYTE ESTERASE UR QL STRIP.AUTO: ABNORMAL
MCH RBC QN AUTO: 32.4 PG (ref 27–33)
MCHC RBC AUTO-ENTMCNC: 34 G/DL (ref 33.6–35)
MCV RBC AUTO: 95.2 FL (ref 81.4–97.8)
MICRO URNS: ABNORMAL
MICROALBUMIN UR-MCNC: <1.2 MG/DL
MICROALBUMIN/CREAT UR: NORMAL MG/G (ref 0–30)
NITRITE UR QL STRIP.AUTO: NEGATIVE
PH UR STRIP.AUTO: 7 [PH] (ref 5–8)
PHOSPHATE SERPL-MCNC: 2.9 MG/DL (ref 2.5–4.5)
PLATELET # BLD AUTO: 331 K/UL (ref 164–446)
PMV BLD AUTO: 9.6 FL (ref 9–12.9)
POTASSIUM SERPL-SCNC: 2.8 MMOL/L (ref 3.6–5.5)
PROT UR QL STRIP: NEGATIVE MG/DL
PROT UR-MCNC: 7 MG/DL (ref 0–15)
PROT/CREAT UR: 99 MG/G (ref 10–107)
PTH-INTACT SERPL-MCNC: 27.7 PG/ML (ref 14–72)
RBC # BLD AUTO: 5 M/UL (ref 4.2–5.4)
RBC # URNS HPF: NORMAL /HPF
RBC UR QL AUTO: NEGATIVE
RHEUMATOID FACT SER IA-ACNC: <10 IU/ML (ref 0–14)
SODIUM SERPL-SCNC: 140 MMOL/L (ref 135–145)
SP GR UR STRIP.AUTO: <=1.005
THYROPEROXIDASE AB SERPL-ACNC: 11 IU/ML (ref 0–9)
URATE SERPL-MCNC: 4.3 MG/DL (ref 1.9–8.2)
WBC # BLD AUTO: 5.5 K/UL (ref 4.8–10.8)
WBC #/AREA URNS HPF: NORMAL /HPF

## 2022-04-27 PROCEDURE — 83970 ASSAY OF PARATHORMONE: CPT

## 2022-04-27 PROCEDURE — 86800 THYROGLOBULIN ANTIBODY: CPT

## 2022-04-27 PROCEDURE — 84156 ASSAY OF PROTEIN URINE: CPT

## 2022-04-27 PROCEDURE — 81001 URINALYSIS AUTO W/SCOPE: CPT

## 2022-04-27 PROCEDURE — 86039 ANTINUCLEAR ANTIBODIES (ANA): CPT

## 2022-04-27 PROCEDURE — 84550 ASSAY OF BLOOD/URIC ACID: CPT

## 2022-04-27 PROCEDURE — 82306 VITAMIN D 25 HYDROXY: CPT

## 2022-04-27 PROCEDURE — 80048 BASIC METABOLIC PNL TOTAL CA: CPT

## 2022-04-27 PROCEDURE — 86376 MICROSOMAL ANTIBODY EACH: CPT

## 2022-04-27 PROCEDURE — 86225 DNA ANTIBODY NATIVE: CPT

## 2022-04-27 PROCEDURE — 82043 UR ALBUMIN QUANTITATIVE: CPT

## 2022-04-27 PROCEDURE — 84100 ASSAY OF PHOSPHORUS: CPT

## 2022-04-27 PROCEDURE — 86235 NUCLEAR ANTIGEN ANTIBODY: CPT

## 2022-04-27 PROCEDURE — 86038 ANTINUCLEAR ANTIBODIES: CPT

## 2022-04-27 PROCEDURE — 36415 COLL VENOUS BLD VENIPUNCTURE: CPT

## 2022-04-27 PROCEDURE — 86431 RHEUMATOID FACTOR QUANT: CPT

## 2022-04-27 PROCEDURE — 86200 CCP ANTIBODY: CPT

## 2022-04-27 PROCEDURE — 82040 ASSAY OF SERUM ALBUMIN: CPT

## 2022-04-27 PROCEDURE — 85027 COMPLETE CBC AUTOMATED: CPT

## 2022-04-27 PROCEDURE — 82570 ASSAY OF URINE CREATININE: CPT

## 2022-04-28 LAB — THYROGLOB AB SERPL-ACNC: <0.9 IU/ML (ref 0–4)

## 2022-04-29 ENCOUNTER — HOSPITAL ENCOUNTER (OUTPATIENT)
Facility: MEDICAL CENTER | Age: 47
End: 2022-04-29
Attending: INTERNAL MEDICINE
Payer: COMMERCIAL

## 2022-04-29 DIAGNOSIS — N20.0 NEPHROLITHIASIS: ICD-10-CM

## 2022-04-29 DIAGNOSIS — N25.1 DIABETES INSIPIDUS, NEPHROGENIC (HCC): ICD-10-CM

## 2022-04-29 LAB
CCP IGG SERPL-ACNC: 12 UNITS (ref 0–19)
NUCLEAR IGG SER QL IA: DETECTED

## 2022-04-29 PROCEDURE — 84105 ASSAY OF URINE PHOSPHORUS: CPT

## 2022-04-29 PROCEDURE — 84300 ASSAY OF URINE SODIUM: CPT

## 2022-04-29 PROCEDURE — 83986 ASSAY PH BODY FLUID NOS: CPT

## 2022-04-29 PROCEDURE — 84560 ASSAY OF URINE/URIC ACID: CPT

## 2022-04-29 PROCEDURE — 82436 ASSAY OF URINE CHLORIDE: CPT

## 2022-04-29 PROCEDURE — 82507 ASSAY OF CITRATE: CPT

## 2022-04-29 PROCEDURE — 83735 ASSAY OF MAGNESIUM: CPT

## 2022-04-29 PROCEDURE — 82340 ASSAY OF CALCIUM IN URINE: CPT

## 2022-04-29 PROCEDURE — 84133 ASSAY OF URINE POTASSIUM: CPT

## 2022-04-29 PROCEDURE — 83945 ASSAY OF OXALATE: CPT

## 2022-04-29 PROCEDURE — 82570 ASSAY OF URINE CREATININE: CPT

## 2022-05-01 LAB
ANA INTERPRETIVE COMMENT Q5143: ABNORMAL
ANA PATTERN Q5144: ABNORMAL
ANA TITER Q5145: ABNORMAL
ANTINUCLEAR ANTIBODY (ANA), HEP-2, IGG Q5142: DETECTED

## 2022-05-02 LAB — U1 SNRNP IGG SER QL: 2 UNITS (ref 0–19)

## 2022-05-03 ENCOUNTER — PHARMACY VISIT (OUTPATIENT)
Dept: PHARMACY | Facility: MEDICAL CENTER | Age: 47
End: 2022-05-03
Payer: COMMERCIAL

## 2022-05-03 LAB
DSDNA AB TITR SER CLIF: 2 IU (ref 0–24)
ENA JO1 AB TITR SER: 0 AU/ML (ref 0–40)
ENA SCL70 IGG SER QL: 4 AU/ML (ref 0–40)
ENA SM IGG SER-ACNC: 0 AU/ML (ref 0–40)
ENA SS-B IGG SER IA-ACNC: 0 AU/ML (ref 0–40)
SSA52 R0ENA AB IGG Q0420: 0 AU/ML (ref 0–40)
SSA60 R0ENA AB IGG Q0419: 0 AU/ML (ref 0–40)

## 2022-05-03 PROCEDURE — RXMED WILLOW AMBULATORY MEDICATION CHARGE: Performed by: STUDENT IN AN ORGANIZED HEALTH CARE EDUCATION/TRAINING PROGRAM

## 2022-05-05 LAB
CALCIUM 24H UR-MCNC: 3.8 MG/DL
CALCIUM 24H UR-MRATE: 171 MG/D (ref 100–250)
CHLORIDE 24H UR-SCNC: 33 MMOL/L
CHLORIDE 24H UR-SRATE: 148 MMOL/D (ref 140–250)
CITRATE 24H UR-MCNC: 42 MG/L
CITRATE 24H UR-MRATE: 189 MG/D (ref 320–1240)
COLLECT DURATION TIME SPEC: 24 HRS
CREAT 24H UR-MCNC: 39 MG/DL
CREAT 24H UR-MRATE: 1755 MG/D (ref 700–1600)
MAGNESIUM 24H UR-MCNC: 4 MG/DL
MAGNESIUM 24H UR-MRATE: 180 MG/D (ref 12–199)
OXALATE 24H UR-MCNC: 13 MG/L
OXALATE 24H UR-MRATE: 58 MG/D (ref 13–40)
PATHOLOGY STUDY: ABNORMAL
PH UR: 7.15 [PH] (ref 5–7.5)
PHOSPHATE 24H UR-MCNC: 23 MG/DL
PHOSPHATE 24H UR-MRATE: 1035 MG/D (ref 400–1300)
POTASSIUM 24H UR-SCNC: 34 MMOL/L
POTASSIUM 24H UR-SRATE: 153 MMOL/D (ref 25–125)
SODIUM 24H UR-SCNC: 36 MMOL/L
SODIUM 24H UR-SRATE: 162 MMOL/D (ref 51–286)
TOTAL VOLUME 1105: 4500 ML
URATE 24H UR-MCNC: 13.5 MG/DL
URATE 24H UR-MRATE: 608 MG/D (ref 250–750)

## 2022-05-10 ENCOUNTER — TELEMEDICINE (OUTPATIENT)
Dept: BEHAVIORAL HEALTH | Facility: CLINIC | Age: 47
End: 2022-05-10
Payer: COMMERCIAL

## 2022-05-10 DIAGNOSIS — F31.62 BIPOLAR DISORDER, CURRENT EPISODE MIXED, MODERATE (HCC): ICD-10-CM

## 2022-05-10 DIAGNOSIS — F41.1 GENERALIZED ANXIETY DISORDER: ICD-10-CM

## 2022-05-10 PROCEDURE — 99214 OFFICE O/P EST MOD 30 MIN: CPT | Mod: 95,GC | Performed by: PSYCHIATRY & NEUROLOGY

## 2022-05-10 RX ORDER — TESTOSTERONE CYPIONATE 200 MG/ML
0.25 INJECTION, SOLUTION INTRAMUSCULAR
COMMUNITY
Start: 2022-04-16

## 2022-05-10 RX ORDER — CARIPRAZINE 4.5 MG/1
4.5 CAPSULE, GELATIN COATED ORAL DAILY
Qty: 30 CAPSULE | Refills: 1 | Status: SHIPPED | OUTPATIENT
Start: 2022-05-10 | End: 2022-06-17 | Stop reason: SDUPTHER
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
## 2022-05-13 ENCOUNTER — OFFICE VISIT (OUTPATIENT)
Dept: NEPHROLOGY | Facility: MEDICAL CENTER | Age: 47
End: 2022-05-13
Payer: COMMERCIAL

## 2022-05-13 VITALS
BODY MASS INDEX: 22.2 KG/M2 | DIASTOLIC BLOOD PRESSURE: 70 MMHG | TEMPERATURE: 98.2 F | HEART RATE: 70 BPM | HEIGHT: 64 IN | OXYGEN SATURATION: 100 % | SYSTOLIC BLOOD PRESSURE: 110 MMHG | WEIGHT: 130 LBS

## 2022-05-13 DIAGNOSIS — F31.9 BIPOLAR AFFECTIVE DISORDER, REMISSION STATUS UNSPECIFIED (HCC): Chronic | ICD-10-CM

## 2022-05-13 DIAGNOSIS — N18.2 CKD (CHRONIC KIDNEY DISEASE) STAGE 2, GFR 60-89 ML/MIN: ICD-10-CM

## 2022-05-13 DIAGNOSIS — N20.0 NEPHROLITHIASIS: Chronic | ICD-10-CM

## 2022-05-13 DIAGNOSIS — N25.1 DIABETES INSIPIDUS, NEPHROGENIC (HCC): Chronic | ICD-10-CM

## 2022-05-13 PROCEDURE — 99214 OFFICE O/P EST MOD 30 MIN: CPT | Performed by: INTERNAL MEDICINE

## 2022-05-13 ASSESSMENT — ENCOUNTER SYMPTOMS
FEVER: 0
SHORTNESS OF BREATH: 0
ABDOMINAL PAIN: 0

## 2022-05-13 ASSESSMENT — FIBROSIS 4 INDEX: FIB4 SCORE: 0.57

## 2022-05-13 NOTE — PROGRESS NOTES
Chief Complaint   Patient presents with   • Follow-Up   • Chronic Kidney Disease         HPI:  Earnestine Lindsay is a 47 y.o. female with a history of CKD stage II, nephrolithiasis, bipolar II disorder on lithium complicated by diabetes insipidus who presents today for follow up.    Re: nephrolithiasis, She has had more kidney stones than she can count. Her first kidney stone episode was mid-20's. She has had stones multiple times a year since her 20's. She has never had sestamibi scan.  Stones have been so severe in the past that she has needed laser lithotripsy. 24-h test results as below. She remains on K-citrate 30 mEq PO BID. She hasn't had kidney stone in a couple months, none since starting HCTZ in 3/2022.     Re: Bipolar disorder, she has been on lithium therapy since late 20's. Kidney stones came before lithium therapy. She has seen a psychiatrist and is trying to get off lithium, got off lithium in 3/2022. She is now on Vraylar (Cariprazine) and zoloft. She is following with psychiatry. She is working as an .     Re: Diabetes insipidus. She is no longer on lithium and started on HCTZ 25mg BID in 3/2022. She has lower urine output. Does complain of constipation. She is able to sleep a little better.         Past Medical History:   Diagnosis Date   • Anxiety    • Asthma     inhalers   • Bipolar 2 disorder (HCC)     depression , anxiety   • Bipolar disorder (HCC) 11/11/2020   • Cancer (HCC) 1996    cervical   • Hypothyroidism 11/11/2020   • Nephrolithiasis 11/11/2020   • Pain     back   • Seizure (HCC) 2013    takes po meds   • Stroke (HCC) 2013    TIA       Past Surgical History:   Procedure Laterality Date   • DE CYSTOSCOPY,INSERT URETERAL STENT Right 12/9/2020    Procedure: CYSTOSCOPY, WITH URETERAL STENT INSERTION;  Surgeon: Dorian Estrada M.D.;  Location: SURGERY [facility];  Service: Urology   • DE CYSTO/URETERO/PYELOSCOPY, DX Right 12/9/2020    Procedure: URETEROSCOPY;  Surgeon:  Dorian Estrada M.D.;  Location: SURGERY [facility];  Service: Urology   • CHOLECYSTECTOMY  2009   • ABDOMINAL HYSTERECTOMY TOTAL  2002   • EXPLORATORY LAPAROTOMY     • HAND SURGERY      3 x right hand, 1x left   • LITHOTRIPSY Right 2014 and 2015    kidney stone   • LYSIS ADHESIONS GENERAL     • OOPHORECTOMY  2003, 2004,    ovarian cysct removal         Outpatient Encounter Medications as of 5/13/2022   Medication Sig Dispense Refill   • testosterone cypionate (DEPO-TESTOSTERONE) 200 MG/ML Solution injection INJECT 0.1 ML INTRAMUSCULARLY WEEKLY DX: N95.1     • Cariprazine HCl (VRAYLAR) 4.5 MG Cap Take 4.5 mg by mouth every day. 30 Capsule 1   • sertraline (ZOLOFT) 50 MG Tab Take 1.5 Tablets by mouth every day. 45 Tablet 1   • hydroCHLOROthiazide (HYDRODIURIL) 25 MG Tab Take 1 Tablet by mouth 2 times a day. 180 Tablet 3   • Galcanezumab-gnlm (EMGALITY) 120 MG/ML Solution Prefilled Syringe Inject 120 mg under the skin every 30 DAYS. 1 mL 5   • tamsulosin (FLOMAX) 0.4 MG capsule TAKE 1 CAPSULE BY MOUTH EVERY DAY. TAKE DAILY DURING EPISODES OF KIDNEY STONE PAIN. 90 Capsule 1   • topiramate (TOPAMAX) 100 MG Tab 1 Tablet 2 times a day. 60 Tablet 3   • Potassium Citrate 15 MEQ (1620 MG) Tab CR Take 2 Tablets by mouth 2 times a day. 360 tablet 3   • Cetirizine HCl (ZYRTEC PO) Take  by mouth as needed.     • oxyCODONE-Acetaminophen (PERCOCET PO) Take  by mouth.     • albuterol 108 (90 Base) MCG/ACT Aero Soln inhalation aerosol 90 Puffs.     • ALPRAZolam (XANAX) 0.5 MG Tab 0.5 mg.     • benzonatate (TESSALON) 100 MG Cap 100 mg.     • butalbital-acetaminophen-caffeine-codeine (FIORICET W/CODEINE) -44-30 MG per capsule 40 Caps.     • cyclobenzaprine (FLEXERIL) 10 mg Tab 10 mg.     • dicyclomine (BENTYL) 20 MG Tab 20 mg.     • Eszopiclone 3 MG Tab 3 mg.     • levothyroxine (SYNTHROID) 75 MCG Tab 75 mcg.     • lidocaine (LIDODERM) 5 % Patch 5 Patches.     • liothyronine (CYTOMEL) 5 MCG Tab 0.005 mcg.     • ondansetron  "(ZOFRAN ODT) 4 MG TABLET DISPERSIBLE 4 mg.     • SUMAtriptan (IMITREX) 50 MG Tab 50 mg.     • Spacer/Aero-Holding Chambers (AEROCHAMBER MAX W/FLOW-VU) Misc Aerochamber Plus Flow-Vu     • HYDROmorphone (DILAUDID) 4 MG Tab hydromorphone 4 mg tablet     • lamotrigine (LAMICTAL) 200 MG tablet Take 1 Tab by mouth 2 Times a Day.     • progesterone (PROMETRIUM) 100 MG Cap Take 1 Cap by mouth every day.     • progesterone (PROMETRIUM) 100 MG Cap      • 7-Keto DHEA Powder  (Patient not taking: Reported on 5/13/2022)     • TESTOSTERONE AQUEOUS IM        No facility-administered encounter medications on file as of 5/13/2022.        Allergies   Allergen Reactions   • Promethazine Hives           Family History   Problem Relation Age of Onset   • Kidney stones Brother    • Kidney Disease Neg Hx        Review of Systems   Constitutional: Negative for fever.   Respiratory: Negative for shortness of breath.    Cardiovascular: Negative for chest pain.   Gastrointestinal: Negative for abdominal pain.   Genitourinary: Negative for dysuria.   All other systems reviewed and are negative.      /70 (BP Location: Right arm, Patient Position: Sitting, BP Cuff Size: Adult)   Pulse 70   Temp 36.8 °C (98.2 °F) (Temporal)   Ht 1.626 m (5' 4\")   Wt 59 kg (130 lb)   SpO2 100%   BMI 22.31 kg/m²     Physical Exam  Constitutional:       General: She is not in acute distress.  HENT:      Mouth/Throat:      Pharynx: No oropharyngeal exudate.   Eyes:      General: No scleral icterus.  Neck:      Trachea: No tracheal deviation.   Cardiovascular:      Rate and Rhythm: Normal rate and regular rhythm.      Heart sounds: Normal heart sounds. No murmur heard.  Pulmonary:      Effort: Pulmonary effort is normal.      Breath sounds: Normal breath sounds. No stridor. No rales.   Abdominal:      General: Bowel sounds are normal.      Palpations: Abdomen is soft.      Tenderness: There is no abdominal tenderness.   Musculoskeletal:         General: " Normal range of motion.      Cervical back: Neck supple.      Right lower leg: No edema.      Left lower leg: No edema.   Skin:     General: Skin is warm and dry.      Findings: No rash.   Neurological:      General: No focal deficit present.      Mental Status: She is alert and oriented to person, place, and time.   Psychiatric:         Mood and Affect: Mood and affect normal.         Behavior: Behavior normal.           Labs reviewed.    Recent Labs     10/12/21  0728 12/13/21  0743 04/27/22  0741   ALBUMIN 5.1* 4.9 4.8   SODIUM 139 139 140   POTASSIUM 4.3 4.5 2.8*   CHLORIDE 107 109 101   CO2 21 22 27   BUN 13 19 15   CREATININE 1.36 1.31 1.02   PHOSPHORUS 3.3 2.4* 2.9       Lab Results   Component Value Date/Time    WBC 5.5 04/27/2022 07:41 AM    RBC 5.00 04/27/2022 07:41 AM    HEMOGLOBIN 16.2 (H) 04/27/2022 07:41 AM    HEMATOCRIT 47.6 (H) 04/27/2022 07:41 AM    MCV 95.2 04/27/2022 07:41 AM    MCH 32.4 04/27/2022 07:41 AM    MCHC 34.0 04/27/2022 07:41 AM    MPV 9.6 04/27/2022 07:41 AM             URINALYSIS:  Lab Results   Component Value Date/Time    COLORURINE Yellow 04/27/2022 0742    CLARITY Clear 04/27/2022 0742    SPECGRAVITY <=1.005 04/27/2022 0742    PHURINE 7.15 04/29/2022 0630    KETONES Negative 04/27/2022 0742    PROTEINURIN Negative 04/27/2022 0742    BILIRUBINUR Negative 04/27/2022 0742    NITRITE Negative 04/27/2022 0742    LEUKESTERAS Small (A) 04/27/2022 0742    OCCULTBLOOD Negative 04/27/2022 0742       UPC  Lab Results   Component Value Date/Time    TOTPROTUR 7.0 04/27/2022 0742      Lab Results   Component Value Date/Time    CREATININEU 1755 (H) 04/29/2022 0630           Imaging reviewed  No orders to display     24-hour urine in April 2022 (on hydrochlorothiazide) with 4.5 L, 171 mg/day calcium, low citric acid    24-hour urine collection in December 2021 with 7000 mL of urine, electrolyte levels pending    24-hour urine collection April 2021 with 5300 mL, high urine oxalate, low average  urine citric acid, 186 mg/day calcium    Repeat 24-hour collection in July 2020 with 6400 mL, urine electrolytes remain pending.      Assessment:  Earnestine Lindsay is a 47 y.o. female with a history of CKD stage II, nephrolithiasis, bipolar II disorder on lithium complicated by diabetes insipidus who presents today for follow up.    Plan:  1. Nephrolithiasis  -Most likely due to calcium oxalate stones given 24-hour urine results.  Recommend low oxalate diet.  Continue potassium citrate 30 mEq p.o. twice daily.  Continue hydrochlorothiazide 25 mg p.o. twice daily to reduce urinary calcium.    2. CKD (chronic kidney disease) stage 2-3a  -Stable.  CKD likely from lithium exposure/interstitial nephritis.  Patient now off of lithium as of March 2022.  Avoid NSAIDs and other nephrotoxins.  Low-salt diet.    3.  Diabetes insipidus.  -Persistent, likely from long-term lithium use.  Patient is now off of lithium as of March 2022, and off of amiloride.  Continue hydrochlorothiazide 25 mg p.o. twice daily.    4.  Bipolar disorder  -Stable.  Patient should continue to follow with psychiatry.  Off of lithium as of March 2022.    5.  Hypervitaminosis D  - Patient was taking 5000 IU daily of cholecalciferol.  Recommend hold vitamin D for 1 month, then resume vitamin D 5000 units thrice weekly.      RTC 6 months with preclinic labs    Trung Schwab MD  Nephrology

## 2022-05-31 PROCEDURE — RXMED WILLOW AMBULATORY MEDICATION CHARGE: Performed by: STUDENT IN AN ORGANIZED HEALTH CARE EDUCATION/TRAINING PROGRAM

## 2022-06-01 ENCOUNTER — PHARMACY VISIT (OUTPATIENT)
Dept: PHARMACY | Facility: MEDICAL CENTER | Age: 47
End: 2022-06-01
Payer: COMMERCIAL

## 2022-06-05 DIAGNOSIS — F31.62 BIPOLAR DISORDER, CURRENT EPISODE MIXED, MODERATE (HCC): ICD-10-CM

## 2022-06-15 ENCOUNTER — DOCUMENTATION (OUTPATIENT)
Dept: PHARMACY | Facility: MEDICAL CENTER | Age: 47
End: 2022-06-15
Payer: COMMERCIAL

## 2022-06-17 ENCOUNTER — TELEMEDICINE (OUTPATIENT)
Dept: BEHAVIORAL HEALTH | Facility: CLINIC | Age: 47
End: 2022-06-17
Payer: COMMERCIAL

## 2022-06-17 DIAGNOSIS — F41.1 GENERALIZED ANXIETY DISORDER: ICD-10-CM

## 2022-06-17 DIAGNOSIS — F31.62 BIPOLAR DISORDER, CURRENT EPISODE MIXED, MODERATE (HCC): ICD-10-CM

## 2022-06-17 PROCEDURE — 99214 OFFICE O/P EST MOD 30 MIN: CPT | Mod: 95,GC | Performed by: STUDENT IN AN ORGANIZED HEALTH CARE EDUCATION/TRAINING PROGRAM

## 2022-06-17 RX ORDER — CARIPRAZINE 4.5 MG/1
4.5 CAPSULE, GELATIN COATED ORAL DAILY
Qty: 30 CAPSULE | Refills: 2 | Status: SHIPPED | OUTPATIENT
Start: 2022-06-17 | End: 2022-08-12 | Stop reason: SDUPTHER
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
## 2022-06-17 NOTE — PROGRESS NOTES
PHARMACIST NEW START - Migraine Call Review   Dx: Chronic migraine   Started: 1/7/22, LD completed in office per Earnestine  Tx prescribed: Emgality 120mg/ml 1 pre-filled syringe every month    • Duration of therapy: until no longer beneficial or toxicity    • Past Txt: Botox,   Med Rec/updated Drug list:  EMR accurate per Earnestine   • OTC Use/Common DI Avoidance: none   • Acute Episodic Medication Use:  fioricet   • Other Pertinent Migraine Med:   DI Check:    • Cat C alprazolam + fioricet + lamictal = increased CNS depression, cont to monitor  Goals of Therapy:   • To reduce migraine frequency, duration, and severity   • To improve acute medication responsiveness and reduce need for acute attacks   • To identify and modify migraine triggers  Patient has agreed to/understands goals of therapy during education/counseling   S/Sx(Baseline) - *has completed 2 doses so far and notes no improvements*   • # of Migraine Days Past 30 Days:  5   • Migraine Symptoms:    ? Daily tension HA that can lead into migraines    ? Photosensitivity    ? Sometimes sensitivity to sound     ? Nausea    ? Vomiting - sometimes if they get THAT bad   • Migraine Pain Severity: varies but her most severe ones get up to a 10/10 and require trip to ED for mgmt but shared she doesn't get them all the time   • Average Duration of Migraine (Hours): from a few hours up to 3 days  Labs    • CBC: (12/17/21) Hct 48.8*H   • Chem7: (12/17/21) wnl   • LFTs: (12/17/21)    • BP/HR:   • ESR: (1/20/22) 4   • CRP: (1/20/22) < 0.3  Admin/Schedule: Emgality 1 PFS injected SC every month, last on 2/7/22, reviewed next due on 3/7   • Inj technique: injecting into rotating tops of thighs  Adherence: using a calendar to help with tracking doses   • Missed dose mgmt:  Inject dose ASAP if within 24 - 48 hrs of typical dosing time and can maintain current weekly dosing schedule. If it is has been more than 48 hrs, to dose ASAP and adjust following dose 28 days out (i.e.  dosing schedule will shift)   • Barriers (if exist): none  New:   List Common SE Reviewed:    • Injection Site Reactions (swelling, bruising, pain, irritation): ice before/after admin; avoiding/limiting touching inj site after; inj site rotation; top cortisone; oral non-drowsy antihistamine    List Serious SE Reviewed/Precautions:    • Hypersensitivity: Report to MD if any presentation/development of: hives; fever; joint pain; swelling of the tongue/face/lips; SOB/difficulty breathing.  Current SE reviewed: none at this time  Proper Handling/Storage/Excursion/Disposal: store Emgality in fridge, protect from heat, light, and freezing. Bring to room temp over 30 minutes but  do not shake, hold, or heat. Once brought to room temp do not return to refrigeration, can be kept at room temp (max of 86F) for up to 7 days. Aware to continue use of sharps container for disposal     Wellness/Lifestyle: does have some relieving factors she uses with migraines such as: taking a shower with alternating hot/cold can sometimes help; goes to bed most of the time.   Risk for falls (use STEADI): n/a  Vaccines:    • COVID19 booster due   • Yearly flu   • Tdap booster due in 2024  ADLs: 1 day missed   Additional: Had a pleasant talk with Earnestine and welcomed her to our services, reports things have been going well so far on Emgality and denies any side effects. Advised it can take up to 3 months to see full benefits and review of how to handle missed doses. She thanked me for the review, no questions/concerns at this time and agreeable to future calls from clinical pharmacist team.

## 2022-06-27 ENCOUNTER — PHARMACY VISIT (OUTPATIENT)
Dept: PHARMACY | Facility: MEDICAL CENTER | Age: 47
End: 2022-06-27
Payer: COMMERCIAL

## 2022-06-27 PROCEDURE — RXMED WILLOW AMBULATORY MEDICATION CHARGE: Performed by: STUDENT IN AN ORGANIZED HEALTH CARE EDUCATION/TRAINING PROGRAM

## 2022-07-06 ENCOUNTER — HOSPITAL ENCOUNTER (OUTPATIENT)
Dept: RADIOLOGY | Facility: MEDICAL CENTER | Age: 47
End: 2022-07-06
Payer: COMMERCIAL

## 2022-07-06 DIAGNOSIS — N20.0 CALCULUS OF KIDNEY: ICD-10-CM

## 2022-07-06 DIAGNOSIS — N20.0 CALCULUS, KIDNEY: ICD-10-CM

## 2022-07-06 PROCEDURE — 76775 US EXAM ABDO BACK WALL LIM: CPT

## 2022-07-06 PROCEDURE — 74018 RADEX ABDOMEN 1 VIEW: CPT

## 2022-07-25 ENCOUNTER — PHARMACY VISIT (OUTPATIENT)
Dept: PHARMACY | Facility: MEDICAL CENTER | Age: 47
End: 2022-07-25
Payer: COMMERCIAL

## 2022-07-25 ENCOUNTER — HOSPITAL ENCOUNTER (OUTPATIENT)
Dept: RADIOLOGY | Facility: MEDICAL CENTER | Age: 47
End: 2022-07-25
Payer: COMMERCIAL

## 2022-07-25 DIAGNOSIS — G40.909 SEIZURE DISORDER (HCC): ICD-10-CM

## 2022-07-25 DIAGNOSIS — N20.0 CALCULUS OF KIDNEY: ICD-10-CM

## 2022-07-25 PROCEDURE — RXMED WILLOW AMBULATORY MEDICATION CHARGE: Performed by: STUDENT IN AN ORGANIZED HEALTH CARE EDUCATION/TRAINING PROGRAM

## 2022-07-25 PROCEDURE — 74176 CT ABD & PELVIS W/O CONTRAST: CPT

## 2022-07-25 RX ORDER — GALCANEZUMAB 120 MG/ML
120 INJECTION, SOLUTION SUBCUTANEOUS
Qty: 1 ML | Refills: 5 | Status: SHIPPED | OUTPATIENT
Start: 2022-07-25 | End: 2023-01-10 | Stop reason: SDUPTHER

## 2022-07-29 ENCOUNTER — APPOINTMENT (OUTPATIENT)
Dept: NEUROLOGY | Facility: MEDICAL CENTER | Age: 47
End: 2022-07-29
Attending: STUDENT IN AN ORGANIZED HEALTH CARE EDUCATION/TRAINING PROGRAM
Payer: COMMERCIAL

## 2022-08-01 ENCOUNTER — HOSPITAL ENCOUNTER (OUTPATIENT)
Dept: RADIOLOGY | Facility: MEDICAL CENTER | Age: 47
End: 2022-08-01
Attending: STUDENT IN AN ORGANIZED HEALTH CARE EDUCATION/TRAINING PROGRAM
Payer: COMMERCIAL

## 2022-08-01 DIAGNOSIS — Z12.31 ENCOUNTER FOR SCREENING MAMMOGRAM FOR BREAST CANCER: ICD-10-CM

## 2022-08-01 PROCEDURE — 77063 BREAST TOMOSYNTHESIS BI: CPT

## 2022-08-09 ENCOUNTER — HOSPITAL ENCOUNTER (OUTPATIENT)
Dept: RADIOLOGY | Facility: MEDICAL CENTER | Age: 47
End: 2022-08-09
Attending: FAMILY MEDICINE
Payer: COMMERCIAL

## 2022-08-09 DIAGNOSIS — R92.8 ABNORMAL MAMMOGRAM: ICD-10-CM

## 2022-08-09 PROCEDURE — G0279 TOMOSYNTHESIS, MAMMO: HCPCS

## 2022-08-09 PROCEDURE — 76642 ULTRASOUND BREAST LIMITED: CPT | Mod: LT

## 2022-08-12 ENCOUNTER — OFFICE VISIT (OUTPATIENT)
Dept: BEHAVIORAL HEALTH | Facility: CLINIC | Age: 47
End: 2022-08-12
Payer: COMMERCIAL

## 2022-08-12 DIAGNOSIS — F51.01 PRIMARY INSOMNIA: ICD-10-CM

## 2022-08-12 DIAGNOSIS — F31.81: ICD-10-CM

## 2022-08-12 DIAGNOSIS — Z79.899 LONG TERM CURRENT USE OF ANTIPSYCHOTIC MEDICATION: ICD-10-CM

## 2022-08-12 PROCEDURE — 99214 OFFICE O/P EST MOD 30 MIN: CPT | Performed by: PSYCHIATRY & NEUROLOGY

## 2022-08-12 RX ORDER — ESZOPICLONE 3 MG/1
3 TABLET, FILM COATED ORAL NIGHTLY PRN
Qty: 30 TABLET | Refills: 1 | Status: SHIPPED | OUTPATIENT
Start: 2022-08-12 | End: 2022-11-30

## 2022-08-12 RX ORDER — CARIPRAZINE 4.5 MG/1
4.5 CAPSULE, GELATIN COATED ORAL DAILY
Qty: 90 CAPSULE | Refills: 1 | Status: SHIPPED | OUTPATIENT
Start: 2022-08-12 | End: 2023-02-08

## 2022-08-12 ASSESSMENT — ENCOUNTER SYMPTOMS
THOUGHT CONTENT - SHAME: 0
HOPELESSNESS: 0
DECREASED APPETITE: 0
PARANOIA: 0
THOUGHT CONTENT - EXCESSIVE UNREASONABLE FEAR: 0
CARDIOVASCULAR NEGATIVE: 1
AWAKENING FROM SLEEP: 1
DECREASED NEED FOR SLEEP: 0
DELUSIONS: 0
AGGRESSION: 0
FREQUENT AWAKENING: 1
HYPERVIGILANCE: 0
PANIC: 0
GASTROINTESTINAL NEGATIVE: 1
COMPULSIONS: 0
EUPHORIC MOOD: 0
HYPERSEXUAL: 0
MUSCULOSKELETAL NEGATIVE: 1
RESPIRATORY NEGATIVE: 1
HYPERSOMNIA: 0
CONSTITUTIONAL NEGATIVE: 1
NEUROLOGICAL NEGATIVE: 1
EYES NEGATIVE: 1
DECREASED ENERGY: 1
AGORAPHOBIA: 0
PREOCCUPATION: 0
DIFFICULTY FALLING ASLEEP: 0
PSYCHIATRIC NEGATIVE: 1

## 2022-08-12 ASSESSMENT — ABNORMAL INVOLUNTARY MOVEMENT SCALE (AIMS)
AIMS_PATIENT_AWARENESS: NO AWARENESS
TONGUE: NONE, NORMAL
AIMS_SEVERITY: 0
JAW: NONE, NORMAL
NECK_SHOULDER_HIPS: NONE, NORMAL
UPPER_BODY_EXTREMITIES: MINIMAL
TOTAL_SCORE: 2
FACIAL_EXPRESSION_MUSCLES: MINIMAL
LIPS_PARIETAL: NONE, NORMAL
LOWER_BODY_EXTREMITIES: NONE, NORMAL
AIMS_PATIENT_INCAPACITATION: NONE, NORMAL

## 2022-08-12 ASSESSMENT — SOCIAL DETERMINANTS OF HEALTH (SDOH): ANXIETY ASSOCIATED WITH SOCIAL SUPPORT SYSTEM: 0
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
## 2022-08-22 ENCOUNTER — PHARMACY VISIT (OUTPATIENT)
Dept: PHARMACY | Facility: MEDICAL CENTER | Age: 47
End: 2022-08-22
Payer: COMMERCIAL

## 2022-08-22 PROCEDURE — RXMED WILLOW AMBULATORY MEDICATION CHARGE: Performed by: STUDENT IN AN ORGANIZED HEALTH CARE EDUCATION/TRAINING PROGRAM

## 2022-08-31 ENCOUNTER — OFFICE VISIT (OUTPATIENT)
Dept: NEUROLOGY | Facility: MEDICAL CENTER | Age: 47
End: 2022-08-31
Attending: STUDENT IN AN ORGANIZED HEALTH CARE EDUCATION/TRAINING PROGRAM
Payer: COMMERCIAL

## 2022-08-31 VITALS
SYSTOLIC BLOOD PRESSURE: 100 MMHG | HEIGHT: 64 IN | TEMPERATURE: 98.4 F | HEART RATE: 73 BPM | DIASTOLIC BLOOD PRESSURE: 58 MMHG | OXYGEN SATURATION: 100 % | WEIGHT: 135.58 LBS | RESPIRATION RATE: 12 BRPM | BODY MASS INDEX: 23.15 KG/M2

## 2022-08-31 DIAGNOSIS — G89.4 CHRONIC PAIN SYNDROME: ICD-10-CM

## 2022-08-31 DIAGNOSIS — F41.1 GENERALIZED ANXIETY DISORDER: ICD-10-CM

## 2022-08-31 DIAGNOSIS — G43.019 INTRACTABLE MIGRAINE WITHOUT AURA AND WITHOUT STATUS MIGRAINOSUS: ICD-10-CM

## 2022-08-31 DIAGNOSIS — T43.595A: ICD-10-CM

## 2022-08-31 DIAGNOSIS — R76.8 ANA POSITIVE: ICD-10-CM

## 2022-08-31 DIAGNOSIS — G25.1 LITHIUM-INDUCED TREMOR: ICD-10-CM

## 2022-08-31 DIAGNOSIS — E03.9 HYPOTHYROIDISM, UNSPECIFIED TYPE: ICD-10-CM

## 2022-08-31 DIAGNOSIS — G40.909 SEIZURE DISORDER (HCC): ICD-10-CM

## 2022-08-31 DIAGNOSIS — F31.9 BIPOLAR AFFECTIVE DISORDER, REMISSION STATUS UNSPECIFIED (HCC): ICD-10-CM

## 2022-08-31 PROCEDURE — 99214 OFFICE O/P EST MOD 30 MIN: CPT | Performed by: STUDENT IN AN ORGANIZED HEALTH CARE EDUCATION/TRAINING PROGRAM

## 2022-08-31 PROCEDURE — 99212 OFFICE O/P EST SF 10 MIN: CPT | Performed by: STUDENT IN AN ORGANIZED HEALTH CARE EDUCATION/TRAINING PROGRAM

## 2022-08-31 ASSESSMENT — FIBROSIS 4 INDEX: FIB4 SCORE: 0.57

## 2022-08-31 NOTE — PROGRESS NOTES
NEUROLOGY FOLLOW-UP - 08/31/2022   REFERRING PROVIDER  No referring provider defined for this encounter.    PCP  Edmond Thomas   [phone]   JUSTIN GILL DO [[id number]]     REASON FOR VISIT: Earnestine Lindsay 47 y.o. female presents today for follow-up.       INTERVAL HISTORY:  Patient doing much better.    No seizures since I last her. Last seizure was around March, 2022 wherever whole right body shakes, preserved awareness generally.    Emgality has decreased headache frequency by 85% and are less intense    Tremors have significantly subsided a bit after coming off lithium and now on other psychotropic.     Still on Topamax 50 mg per day.    Main complaint is fatigue    Patient's PMH, PSH, FH, and SH were reviewed.    ROS: All review of systems complete and are negative except as documented    CURRENT MEDICATIONS AT THE TIME OF THIS ENCOUNTER:    Current Outpatient Medications:     sertraline, 75 mg, Oral, QDAY, Taking    Vraylar, 4.5 mg, Oral, DAILY, Taking    Eszopiclone, 3 mg, Oral, HS PRN, PRN    Emgality, 120 mg, Subcutaneous, Q30 DAYS, Taking    cholecalciferol, 5,000 Units, Oral, MO, WE + FR, Taking    testosterone cypionate, INJECT 0.1 ML INTRAMUSCULARLY WEEKLY DX: N95.1, Taking    hydroCHLOROthiazide, 25 mg, Oral, BID, Taking    tamsulosin, 0.4 mg, Oral, DAILY, PRN    topiramate, 100 mg, BID (Patient taking differently: 50 mg, 2 TIMES DAILY, Other), Taking Differently    Potassium Citrate, 2 Tablet, Oral, BID, Taking    oxyCODONE-Acetaminophen (PERCOCET PO), Take  by mouth., PRN    albuterol, 90 Puffs., PRN    ALPRAZolam, 0.5 mg., PRN    benzonatate, 100 mg., PRN    butalbital-acetaminophen-caffeine-codeine, 40 Caps., PRN    cyclobenzaprine, 10 mg., PRN    dicyclomine, 20 mg., PRN    levothyroxine, 75 mcg., Taking    lidocaine, 5 Patches., PRN    liothyronine, 0.005 mcg., Taking    ondansetron, 4 mg., PRN    SUMAtriptan, 50 mg., PRN    HYDROmorphone, hydromorphone 4 mg tablet, PRN     "lamoTRIgine, 100 mg, Oral, BID, Taking Differently    progesterone, 1 Capsule, Oral, DAILY, Taking    AeroChamber MAX w/Flow-VU, Aerochamber Plus Flow-Vu     EXAM:   Encounter Vitals  /58 (BP Location: Right arm, Patient Position: Sitting, BP Cuff Size: Adult)   Pulse 73   Temp 36.9 °C (98.4 °F) (Temporal)   Resp 12   Ht 1.626 m (5' 4\")   Wt 61.5 kg (135 lb 9.3 oz)   SpO2 100%            Physical Exam:  Physical Exam  HENT:      Head: Normocephalic and atraumatic.      Mouth/Throat:      Mouth: Mucous membranes are moist.   Eyes:      Extraocular Movements: EOM normal. No nystagmus.      Pupils: Pupils are equal, round, and reactive to light.   Pulmonary:      Effort: Pulmonary effort is normal. No respiratory distress.      Breath sounds: Normal breath sounds.   Skin:     General: Skin is warm and dry.      Coloration: Skin is not jaundiced.      Comments: Subtle scaly red rashes on the left forehead and right forearm   Neurological:      Mental Status: She is alert.      Motor: Motor strength is normal.      Deep Tendon Reflexes:      Reflex Scores:       Tricep reflexes are 2+ on the right side and 2+ on the left side.       Bicep reflexes are 2+ on the right side and 2+ on the left side.       Brachioradialis reflexes are 2+ on the right side and 2+ on the left side.       Patellar reflexes are 2+ on the right side and 2+ on the left side.       Achilles reflexes are 2+ on the right side and 2+ on the left side.  Psychiatric:         Speech: Speech normal.      Comments: Flat affect. No tics, tremors      Neurological Exam   Neurological Exam  Mental Status  Alert. Speech is normal. Language is fluent with no aphasia. Attention and concentration are normal.    Cranial Nerves  CN II: Visual acuity is normal. Visual fields full to confrontation.  CN III, IV, VI: Extraocular movements intact bilaterally. No nystagmus. Normal saccades. Normal smooth pursuit. Pupils equal round and reactive to light " bilaterally.   Right pupil: 3 mm. Round. Reactive to light. Reactive to accommodation.   Left pupil: 3 mm. Round. Reactive to light. Reactive to accommodation.  Relative afferent pupillary defect absent.  CN V: Facial sensation is normal.  CN VII: Full and symmetric facial movement.  CN VIII: Hearing is normal.  CN IX, X: Palate elevates symmetrically  CN XI: Shoulder shrug strength is normal.  CN XII: Tongue midline without atrophy or fasciculations.    Motor  Normal muscle bulk throughout. No fasciculations present. Normal muscle tone. No abnormal involuntary movements. Strength is 5/5 throughout all four extremities.    Sensory  Light touch is normal in upper and lower extremities.  No right-sided hemispatial neglect. No left-sided hemispatial neglect.    Reflexes                                            Right                      Left  Brachioradialis                    2+                         2+  Biceps                                 2+                         2+  Triceps                                2+                         2+  Patellar                                2+                         2+  Achilles                                2+                         2+  Plantar                           Mute                Mute    Right pathological reflexes: Ankle clonus absent.  Left pathological reflexes: Ankle clonus absent.    Coordination  Right: Finger-to-nose normal.Left: Finger-to-nose normal.    Gait  Casual gait is normal including stance, stride, and arm swing.     ALL DATA (I.e. labs, procedures, imaging, reports, clinical notes, etc.) FROM [facility] AND/OR OUTSIDE SOURCES, IF AVAILABLE, PERSONALLY REVIEWED:       ASSESSMENT, EDUCATION, AND COUNSELING:  This is a 47 y.o. female patient who presents to the neurology clinic. We had an extensive discussion about the patient's symptoms, signs, and work-up to date, if any. We discussed potential and/or definitive diagnoses, work-up, and potential  treatments.       PLAN:  If applicable, the work-up such as labs, imaging, procedures, and/or other testing, referrals, and/or recommended treatment strategies are listed below.    Visit Diagnoses     ICD-10-CM   1. Seizure disorder (HCC)  G40.909   2. Intractable migraine without aura and without status migrainosus  G43.019   3. MITCH positive  R76.8   4. Bipolar affective disorder, remission status unspecified (HCC)  F31.9   5. Generalized anxiety disorder  F41.1   6. Hypothyroidism, unspecified type  E03.9   7. Chronic pain syndrome  G89.4   8. 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 Adverse effect of lithium Inactive T43.595A        No orders of the defined types were placed in this encounter.       Patient overall doing much better in terms of her mood switching from lithium to new psychotropic medications. Her tremors have resolved - suspect lithium-induced. She is doing well on topamax and denies any seizures for several months. Regarding migraines, there has been significant improvement int he frequency and severity of her migraines as noted above. Will continue emgality. Also discussed her positive MITCH findings. While it is high positive x2 12 weeks apart, she does not have any concerning rheumatological signs/symptoms that warranted investigation and/or rheum referral at this time. F/u in 6-8 months, sooner if needed.          BILLING DOCUMENTATION:     I spent a total of I spent a total of 30 minutes on the day of the visit.    minutes of face-to-face time in this visit. Over 50% of the time of the visit today was spent on counseling and/or coordination of care wtih the patient and/or family, as above in assessment in plan.    Lalo Brumfield MD  Epilepsy and General Neurology  Department of Neurology  Clinical  of Neurology [facility] [place].

## 2022-09-02 ASSESSMENT — VISUAL ACUITY: VA_NORMAL: 1

## 2022-09-02 NOTE — PATIENT INSTRUCTIONS
Neurology Clinic Visit     IMPORTANT: If imaging, procedure(s), AND/or referrals were placed for you:  Contact [facility] Scheduling if you have not heard from them in 5 day: [phone]    Outpatient [facility] Imaging Sites.  [address]  Mon-Fri 8am-5pm   [address] (Breast Center) Mon-Fri 7am-4pm    [address]  Mon-Fri 8am-5pm  [facility] 7am-6:30pm  [address] Mon-Fri 8am-7pm  Sat 9am-6pm   [address]  Mon-Fri 8am-7pm and Sat/Sun 9am-5pm  [address]  Mon-Fri 8am-5pm  [address] (PET/CT)  Mon-Fri 8:30am-4:30pm     If labs were ordered for you:  To Schedule an appointment for lab services, please call [phone] or visit [email or web address].  [facility] Lab Services Convenient Locations:    [place]: [address] (Ground Floor)  [address] [facility] (Admitting Entrance)  [address], [address]  [address], [address]  [address] [facility], [address] [facility]  [address]: [address]  [address]       [place]: [address] [place] [address] Dr  [address]     [facility]     [address] [place] [address]     Laboratory Hours: 6:30am - 6pm  Monday - Friday,   7am - 2pm Saturday    [facility]      [address] [facility] [place] [address]      Laboratory Hours:  7:30am - 4pm  Monday - Friday,  9am - 3pm Saturday    [facility]     [address]    [place] [address]      Laboratory Hours:  7:00am - 5:30pm  Monday - Friday    [facility]      [address] [place] [address]       Laboratory Hours:  7:30am - 4:30pm  Monday - Friday    [facility]       [address] [place] [address]       Laboratory Hours:  8am - 5pm  Monday - Friday    [facility]       [address] [place] [address]       Laboratory hours:  7:30am - 4pm   Monday - Friday    GENERAL REMINDERS:  Request refills 1 week in advance to ensure you do not run out of medications  All diagnostic study results will be reviewed at your next visit, UNLESS there are urgent results that need to be acted on sooner.  Please remember that it is the your responsibility to check with your Insurance for benefit coverage for visit / visits.  24 hours notice is required for all appointment changes or cancellation.  Please arrive 20 min. before your appointment time  Please note that because Dr. Brumfield has high daily clinic volume, he is unable to accommodate late arrivals. If you are more than 15 minutes late for the scheduled appointment you will be asked to reschedule  Please bring the following with you:  1) Picture ID  2) Insurance card  3) An updated list of ALL your medications and their dosages (prescribed medications, over the counter medications, and all supplements), as well as allergies with you at all times  4) A list of questions, concerns, comments that you wish to discuss with Dr. Octaviano Brumfield MD  General Neurologist and Epileptologist  Department of Neurology  Instructor of Clinical Neurology [facility].

## 2022-09-19 ENCOUNTER — PHARMACY VISIT (OUTPATIENT)
Dept: PHARMACY | Facility: MEDICAL CENTER | Age: 47
End: 2022-09-19
Payer: COMMERCIAL

## 2022-09-19 PROCEDURE — RXMED WILLOW AMBULATORY MEDICATION CHARGE: Performed by: STUDENT IN AN ORGANIZED HEALTH CARE EDUCATION/TRAINING PROGRAM

## 2022-09-27 DIAGNOSIS — N20.0 NEPHROLITHIASIS: ICD-10-CM

## 2022-09-28 RX ORDER — POTASSIUM CITRATE 15 MEQ/1
2 TABLET, EXTENDED RELEASE ORAL 2 TIMES DAILY
Qty: 360 TABLET | Refills: 3 | Status: SHIPPED | OUTPATIENT
Start: 2022-09-28 | End: 2023-08-03

## 2022-10-18 ENCOUNTER — DOCUMENTATION (OUTPATIENT)
Dept: PHARMACY | Facility: MEDICAL CENTER | Age: 47
End: 2022-10-18
Payer: COMMERCIAL

## 2022-10-18 NOTE — PROGRESS NOTES
I spoke to pt regarding Emgality refill. Pt stated she is doing well except the last couple months she has been getting breakthrough headaches and is bummed it's not working as well as it use to. Pt does have medication she takes for the breakthrough headaches. Pt has 0 remaining and is due for next dose on 11/8. Delivery scheduled for 10/27. Pt had no questions or concerns.

## 2022-10-26 PROCEDURE — RXMED WILLOW AMBULATORY MEDICATION CHARGE: Performed by: STUDENT IN AN ORGANIZED HEALTH CARE EDUCATION/TRAINING PROGRAM

## 2022-10-27 ENCOUNTER — PHARMACY VISIT (OUTPATIENT)
Dept: PHARMACY | Facility: MEDICAL CENTER | Age: 47
End: 2022-10-27
Payer: COMMERCIAL

## 2022-11-01 ENCOUNTER — HOSPITAL ENCOUNTER (OUTPATIENT)
Dept: LAB | Facility: MEDICAL CENTER | Age: 47
End: 2022-11-01
Attending: STUDENT IN AN ORGANIZED HEALTH CARE EDUCATION/TRAINING PROGRAM
Payer: COMMERCIAL

## 2022-11-01 DIAGNOSIS — N25.1 DIABETES INSIPIDUS, NEPHROGENIC (HCC): Chronic | ICD-10-CM

## 2022-11-01 DIAGNOSIS — N20.0 NEPHROLITHIASIS: Chronic | ICD-10-CM

## 2022-11-01 DIAGNOSIS — Z79.899 LONG TERM CURRENT USE OF ANTIPSYCHOTIC MEDICATION: ICD-10-CM

## 2022-11-01 DIAGNOSIS — N18.2 CKD (CHRONIC KIDNEY DISEASE) STAGE 2, GFR 60-89 ML/MIN: ICD-10-CM

## 2022-11-01 LAB
ALBUMIN SERPL BCP-MCNC: 4.8 G/DL (ref 3.2–4.9)
ANION GAP SERPL CALC-SCNC: 11 MMOL/L (ref 7–16)
APPEARANCE UR: CLEAR
BILIRUB UR QL STRIP.AUTO: NEGATIVE
BUN SERPL-MCNC: 15 MG/DL (ref 8–22)
CALCIUM SERPL-MCNC: 9.9 MG/DL (ref 8.4–10.2)
CHLORIDE SERPL-SCNC: 100 MMOL/L (ref 96–112)
CO2 SERPL-SCNC: 28 MMOL/L (ref 20–33)
COLOR UR: YELLOW
CREAT SERPL-MCNC: 0.95 MG/DL (ref 0.5–1.4)
CREAT UR-MCNC: 29.14 MG/DL
CREAT UR-MCNC: 30.32 MG/DL
ERYTHROCYTE [DISTWIDTH] IN BLOOD BY AUTOMATED COUNT: 41.1 FL (ref 35.9–50)
GFR SERPLBLD CREATININE-BSD FMLA CKD-EPI: 73 ML/MIN/1.73 M 2
GLUCOSE SERPL-MCNC: 76 MG/DL (ref 65–99)
GLUCOSE UR STRIP.AUTO-MCNC: NEGATIVE MG/DL
HCT VFR BLD AUTO: 44.6 % (ref 37–47)
HGB BLD-MCNC: 15.2 G/DL (ref 12–16)
KETONES UR STRIP.AUTO-MCNC: NEGATIVE MG/DL
LEUKOCYTE ESTERASE UR QL STRIP.AUTO: NEGATIVE
MCH RBC QN AUTO: 32.8 PG (ref 27–33)
MCHC RBC AUTO-ENTMCNC: 34.1 G/DL (ref 33.6–35)
MCV RBC AUTO: 96.3 FL (ref 81.4–97.8)
MICRO URNS: NORMAL
MICROALBUMIN UR-MCNC: <1.2 MG/DL
MICROALBUMIN/CREAT UR: NORMAL MG/G (ref 0–30)
NITRITE UR QL STRIP.AUTO: NEGATIVE
PH UR STRIP.AUTO: 7.5 [PH] (ref 5–8)
PHOSPHATE SERPL-MCNC: 2.7 MG/DL (ref 2.5–4.5)
PLATELET # BLD AUTO: 312 K/UL (ref 164–446)
PMV BLD AUTO: 9.6 FL (ref 9–12.9)
POTASSIUM SERPL-SCNC: 3.9 MMOL/L (ref 3.6–5.5)
PROT UR QL STRIP: NEGATIVE MG/DL
PROT UR-MCNC: <4 MG/DL (ref 0–15)
PROT/CREAT UR: NORMAL MG/G (ref 10–107)
RBC # BLD AUTO: 4.63 M/UL (ref 4.2–5.4)
RBC UR QL AUTO: NEGATIVE
SODIUM SERPL-SCNC: 139 MMOL/L (ref 135–145)
SP GR UR STRIP.AUTO: 1.01
URATE SERPL-MCNC: 4.1 MG/DL (ref 1.9–8.2)
WBC # BLD AUTO: 6.4 K/UL (ref 4.8–10.8)

## 2022-11-01 PROCEDURE — 83036 HEMOGLOBIN GLYCOSYLATED A1C: CPT

## 2022-11-01 PROCEDURE — 36415 COLL VENOUS BLD VENIPUNCTURE: CPT

## 2022-11-01 PROCEDURE — 84550 ASSAY OF BLOOD/URIC ACID: CPT

## 2022-11-01 PROCEDURE — 83970 ASSAY OF PARATHORMONE: CPT

## 2022-11-01 PROCEDURE — 82570 ASSAY OF URINE CREATININE: CPT

## 2022-11-01 PROCEDURE — 84156 ASSAY OF PROTEIN URINE: CPT

## 2022-11-01 PROCEDURE — 84100 ASSAY OF PHOSPHORUS: CPT

## 2022-11-01 PROCEDURE — 82043 UR ALBUMIN QUANTITATIVE: CPT

## 2022-11-01 PROCEDURE — 81003 URINALYSIS AUTO W/O SCOPE: CPT

## 2022-11-01 PROCEDURE — 80048 BASIC METABOLIC PNL TOTAL CA: CPT

## 2022-11-01 PROCEDURE — 82040 ASSAY OF SERUM ALBUMIN: CPT

## 2022-11-01 PROCEDURE — 82306 VITAMIN D 25 HYDROXY: CPT

## 2022-11-01 PROCEDURE — 85027 COMPLETE CBC AUTOMATED: CPT

## 2022-11-02 LAB
25(OH)D3 SERPL-MCNC: 73 NG/ML (ref 30–100)
EST. AVERAGE GLUCOSE BLD GHB EST-MCNC: 105 MG/DL
HBA1C MFR BLD: 5.3 % (ref 4–5.6)
PTH-INTACT SERPL-MCNC: 38.3 PG/ML (ref 14–72)

## 2022-11-03 ENCOUNTER — HOSPITAL ENCOUNTER (OUTPATIENT)
Facility: MEDICAL CENTER | Age: 47
End: 2022-11-03
Attending: INTERNAL MEDICINE
Payer: COMMERCIAL

## 2022-11-03 DIAGNOSIS — N20.0 NEPHROLITHIASIS: ICD-10-CM

## 2022-11-03 DIAGNOSIS — N25.1 DIABETES INSIPIDUS, NEPHROGENIC (HCC): ICD-10-CM

## 2022-11-03 PROCEDURE — 84560 ASSAY OF URINE/URIC ACID: CPT

## 2022-11-03 PROCEDURE — 84133 ASSAY OF URINE POTASSIUM: CPT

## 2022-11-03 PROCEDURE — 83735 ASSAY OF MAGNESIUM: CPT

## 2022-11-03 PROCEDURE — 82570 ASSAY OF URINE CREATININE: CPT

## 2022-11-03 PROCEDURE — 84300 ASSAY OF URINE SODIUM: CPT

## 2022-11-03 PROCEDURE — 83945 ASSAY OF OXALATE: CPT

## 2022-11-03 PROCEDURE — 82507 ASSAY OF CITRATE: CPT

## 2022-11-03 PROCEDURE — 84105 ASSAY OF URINE PHOSPHORUS: CPT

## 2022-11-03 PROCEDURE — 83986 ASSAY PH BODY FLUID NOS: CPT

## 2022-11-03 PROCEDURE — 82340 ASSAY OF CALCIUM IN URINE: CPT

## 2022-11-03 PROCEDURE — 82436 ASSAY OF URINE CHLORIDE: CPT

## 2022-11-08 RX ORDER — ESCITALOPRAM OXALATE 10 MG/1
TABLET ORAL
Qty: 30 TABLET | Refills: 2 | Status: SHIPPED | OUTPATIENT
Start: 2022-11-08 | End: 2022-12-06

## 2022-11-10 LAB
CALCIUM 24H UR-MCNC: 3.7 MG/DL
CALCIUM 24H UR-MRATE: 189 MG/D (ref 100–250)
CHLORIDE 24H UR-SCNC: 46 MMOL/L
CHLORIDE 24H UR-SRATE: 235 MMOL/D (ref 140–250)
CITRATE 24H UR-MCNC: 142 MG/L
CITRATE 24H UR-MRATE: 724 MG/D (ref 320–1240)
COLLECT DURATION TIME SPEC: 24 HRS
CREAT 24H UR-MCNC: 35 MG/DL
CREAT 24H UR-MRATE: 1785 MG/D (ref 700–1600)
MAGNESIUM 24H UR-MCNC: 6.5 MG/DL
MAGNESIUM 24H UR-MRATE: 332 MG/D (ref 12–199)
OXALATE 24H UR-MCNC: 11 MG/L
OXALATE 24H UR-MRATE: 56 MG/D (ref 13–40)
PATHOLOGY STUDY: ABNORMAL
PH UR: 7.36 [PH] (ref 5–7.5)
PHOSPHATE 24H UR-MCNC: 20 MG/DL
PHOSPHATE 24H UR-MRATE: 1020 MG/D (ref 400–1300)
POTASSIUM 24H UR-SCNC: 38 MMOL/L
POTASSIUM 24H UR-SRATE: 194 MMOL/D (ref 25–125)
SODIUM 24H UR-SCNC: 51 MMOL/L
SODIUM 24H UR-SRATE: 260 MMOL/D (ref 51–286)
TOTAL VOLUME 1105: 5100 ML
URATE 24H UR-MCNC: 11.7 MG/DL
URATE 24H UR-MRATE: 597 MG/D (ref 250–750)

## 2022-11-15 ENCOUNTER — OFFICE VISIT (OUTPATIENT)
Dept: NEPHROLOGY | Facility: MEDICAL CENTER | Age: 47
End: 2022-11-15
Payer: COMMERCIAL

## 2022-11-15 VITALS
TEMPERATURE: 97.9 F | HEIGHT: 64 IN | DIASTOLIC BLOOD PRESSURE: 62 MMHG | SYSTOLIC BLOOD PRESSURE: 94 MMHG | RESPIRATION RATE: 16 BRPM | HEART RATE: 71 BPM | WEIGHT: 148.2 LBS | BODY MASS INDEX: 25.3 KG/M2 | OXYGEN SATURATION: 100 %

## 2022-11-15 DIAGNOSIS — F31.9 BIPOLAR AFFECTIVE DISORDER, REMISSION STATUS UNSPECIFIED (HCC): Chronic | ICD-10-CM

## 2022-11-15 DIAGNOSIS — N25.1 DIABETES INSIPIDUS, NEPHROGENIC (HCC): Chronic | ICD-10-CM

## 2022-11-15 DIAGNOSIS — N20.0 NEPHROLITHIASIS: Chronic | ICD-10-CM

## 2022-11-15 DIAGNOSIS — N18.2 CKD (CHRONIC KIDNEY DISEASE) STAGE 2, GFR 60-89 ML/MIN: ICD-10-CM

## 2022-11-15 PROCEDURE — 99214 OFFICE O/P EST MOD 30 MIN: CPT | Performed by: INTERNAL MEDICINE

## 2022-11-15 RX ORDER — LAMOTRIGINE 200 MG/1
TABLET ORAL
COMMUNITY
Start: 2022-09-16 | End: 2023-07-24 | Stop reason: SDUPTHER

## 2022-11-15 RX ORDER — PROGESTERONE 100 MG/1
CAPSULE ORAL
COMMUNITY
Start: 2022-10-14 | End: 2022-11-15

## 2022-11-15 ASSESSMENT — ENCOUNTER SYMPTOMS
SHORTNESS OF BREATH: 1
ABDOMINAL PAIN: 0
FEVER: 0

## 2022-11-15 NOTE — PROGRESS NOTES
Chief Complaint   Patient presents with    Follow-Up     CKD         HPI:  Earnestine Lindsay is a 47 y.o. female with a history of CKD stage II, nephrolithiasis, bipolar II disorder on lithium complicated by diabetes insipidus who presents today for follow up.    Re: nephrolithiasis, She has had more kidney stones than she can count. Her first kidney stone episode was mid-20's. She has had stones multiple times a year since her 20's. She has never had sestamibi scan.  Stones have been so severe in the past that she has needed laser lithotripsy. 24-h test results as below. She remains on K-citrate 30 mEq PO BID. She had two kidney stone pains since the last visit.     Re: Bipolar disorder, she has been on lithium therapy since late 20's. Kidney stones came before lithium therapy. She has seen a psychiatrist and is trying to get off lithium, got off lithium in 3/2022. She is now on Vraylar (Cariprazine) and Lexapro. She is following with psychiatry. She is still working as an .     Re: Diabetes insipidus. She is no longer on lithium and started on HCTZ 25mg BID in 3/2022. She has ~5L UOP. Sleeping ok at night.           Past Medical History:   Diagnosis Date    Anxiety     Asthma     inhalers    Bipolar 2 disorder (HCC)     depression , anxiety    Bipolar disorder (HCC) 11/11/2020    Cancer (Formerly [facility]) 1996    cervical    Hypothyroidism 11/11/2020    Nephrolithiasis 11/11/2020    Pain     back    Seizure (HCC) 2013    takes po meds    Stroke (HCC) 2013    TIA       Past Surgical History:   Procedure Laterality Date    OR CYSTOSCOPY,INSERT URETERAL STENT Right 12/9/2020    Procedure: CYSTOSCOPY, WITH URETERAL STENT INSERTION;  Surgeon: Dorian Estrada M.D.;  Location: SURGERY [facility];  Service: Urology    OR CYSTO/URETERO/PYELOSCOPY, DX Right 12/9/2020    Procedure: URETEROSCOPY;  Surgeon: Dorian Estrada M.D.;  Location: SURGERY [facility];  Service: Urology    CHOLECYSTECTOMY  2009     ABDOMINAL HYSTERECTOMY TOTAL  2002    EXPLORATORY LAPAROTOMY      HAND SURGERY      3 x right hand, 1x left    LITHOTRIPSY Right 2014 and 2015    kidney stone    LYSIS ADHESIONS GENERAL      OOPHORECTOMY  2003, 2004,    ovarian cysct removal         Outpatient Encounter Medications as of 11/15/2022   Medication Sig Dispense Refill    lamotrigine (LAMICTAL) 200 MG tablet TAKE 1 TABLET BY MOUTH EVERY MORNING THEN TAKE 1 TABLET BY MOUTH IN THE EVENING      escitalopram (LEXAPRO) 10 MG Tab Take 0.5 Tablets by mouth every morning for 7 days, THEN 1 Tablet every morning for 21 days. 30 Tablet 2    Potassium Citrate 15 MEQ (1620 MG) Tab CR TAKE 2 TABLETS BY MOUTH 2 TIMES A DAY. 360 Tablet 3    Cariprazine HCl (VRAYLAR) 4.5 MG Cap Take 4.5 mg by mouth every day for 180 days. 90 Capsule 1    Galcanezumab-gnlm (EMGALITY) 120 MG/ML Solution Prefilled Syringe Inject 120 mg under the skin every 30 DAYS. 1 mL 5    cholecalciferol (VITAMIN D3) 5000 UNIT Cap Take 1 Capsule by mouth every Monday, Wednesday, and Friday.      testosterone cypionate (DEPO-TESTOSTERONE) 200 MG/ML Solution injection INJECT 0.1 ML INTRAMUSCULARLY WEEKLY DX: N95.1      hydroCHLOROthiazide (HYDRODIURIL) 25 MG Tab Take 1 Tablet by mouth 2 times a day. 180 Tablet 3    tamsulosin (FLOMAX) 0.4 MG capsule TAKE 1 CAPSULE BY MOUTH EVERY DAY. TAKE DAILY DURING EPISODES OF KIDNEY STONE PAIN. 90 Capsule 1    topiramate (TOPAMAX) 100 MG Tab 1 Tablet 2 times a day. (Patient taking differently: 0.5 Tablets 2 times a day.) 60 Tablet 3    oxyCODONE-Acetaminophen (PERCOCET PO) Take  by mouth.      albuterol 108 (90 Base) MCG/ACT Aero Soln inhalation aerosol 90 Puffs.      ALPRAZolam (XANAX) 0.5 MG Tab 1 Tablet.      benzonatate (TESSALON) 100 MG Cap 1 Capsule.      butalbital-acetaminophen-caffeine-codeine (FIORICET W/CODEINE) -27-30 MG per capsule 40 Capsules.      cyclobenzaprine (FLEXERIL) 10 mg Tab 1 Tablet.      dicyclomine (BENTYL) 20 MG Tab 1 Tablet.       "levothyroxine (SYNTHROID) 75 MCG Tab 1 Tablet.      lidocaine (LIDODERM) 5 % Patch 5 Patches.      liothyronine (CYTOMEL) 5 MCG Tab 0.005 mcg.      ondansetron (ZOFRAN ODT) 4 MG TABLET DISPERSIBLE 1 Tablet.      SUMAtriptan (IMITREX) 50 MG Tab 1 Tablet.      Spacer/Aero-Holding Chambers (AEROCHAMBER MAX W/FLOW-VU) Misc Aerochamber Plus Flow-Vu      HYDROmorphone (DILAUDID) 4 MG Tab hydromorphone 4 mg tablet      progesterone (PROMETRIUM) 100 MG Cap Take 1 Capsule by mouth every day.      [DISCONTINUED] progesterone (PROMETRIUM) 100 MG Cap TAKE 1 CAPSULE BY MOUTH @ BEDTIME (Patient not taking: Reported on 11/15/2022)      [DISCONTINUED] lamoTRIgine (LAMICTAL) 100 MG Tab Take 1 Tablet by mouth 2 times a day. (Patient not taking: Reported on 11/15/2022)       No facility-administered encounter medications on file as of 11/15/2022.        Allergies   Allergen Reactions    Promethazine Hives           Family History   Problem Relation Age of Onset    Kidney stones Brother     Kidney Disease Neg Hx        Review of Systems   Constitutional:  Negative for fever.   Respiratory:  Positive for shortness of breath.    Cardiovascular:  Negative for chest pain.   Gastrointestinal:  Negative for abdominal pain.   All other systems reviewed and are negative.    BP (!) 94/62 (BP Location: Right arm, Patient Position: Sitting, BP Cuff Size: Adult)   Pulse 71   Temp 36.6 °C (97.9 °F) (Temporal)   Resp 16   Ht 1.626 m (5' 4\")   Wt 67.2 kg (148 lb 3.2 oz)   SpO2 100%   BMI 25.44 kg/m²     Physical Exam  Constitutional:       General: She is not in acute distress.  HENT:      Mouth/Throat:      Pharynx: No oropharyngeal exudate.   Eyes:      General: No scleral icterus.  Neck:      Trachea: No tracheal deviation.   Cardiovascular:      Rate and Rhythm: Normal rate and regular rhythm.      Heart sounds: Normal heart sounds. No murmur heard.  Pulmonary:      Effort: Pulmonary effort is normal.      Breath sounds: Normal breath " sounds. No stridor. No rales.   Abdominal:      General: Bowel sounds are normal.      Palpations: Abdomen is soft.      Tenderness: There is no abdominal tenderness.   Musculoskeletal:         General: Normal range of motion.      Cervical back: Neck supple.      Right lower leg: No edema.      Left lower leg: No edema.   Skin:     General: Skin is warm and dry.      Findings: No rash.   Neurological:      General: No focal deficit present.      Mental Status: She is alert and oriented to person, place, and time.   Psychiatric:         Mood and Affect: Mood and affect normal.         Behavior: Behavior normal.         Labs reviewed.    Recent Labs     12/13/21  0743 04/27/22  0741 11/01/22  1641   ALBUMIN 4.9 4.8 4.8   SODIUM 139 140 139   POTASSIUM 4.5 2.8* 3.9   CHLORIDE 109 101 100   CO2 22 27 28   BUN 19 15 15   CREATININE 1.31 1.02 0.95   PHOSPHORUS 2.4* 2.9 2.7       Lab Results   Component Value Date/Time    WBC 6.4 11/01/2022 04:41 PM    RBC 4.63 11/01/2022 04:41 PM    HEMOGLOBIN 15.2 11/01/2022 04:41 PM    HEMATOCRIT 44.6 11/01/2022 04:41 PM    MCV 96.3 11/01/2022 04:41 PM    MCH 32.8 11/01/2022 04:41 PM    MCHC 34.1 11/01/2022 04:41 PM    MPV 9.6 11/01/2022 04:41 PM             URINALYSIS:  Lab Results   Component Value Date/Time    COLORURINE Yellow 11/01/2022 1640    CLARITY Clear 11/01/2022 1640    SPECGRAVITY 1.010 11/01/2022 1640    PHURINE 7.36 11/03/2022 0600    KETONES Negative 11/01/2022 1640    PROTEINURIN Negative 11/01/2022 1640    BILIRUBINUR Negative 11/01/2022 1640    NITRITE Negative 11/01/2022 1640    LEUKESTERAS Negative 11/01/2022 1640    OCCULTBLOOD Negative 11/01/2022 1640       UPC  Lab Results   Component Value Date/Time    TOTPROTUR <4.0 11/01/2022 1640      Lab Results   Component Value Date/Time    CREATININEU 1785 (H) 11/03/2022 0600             Imaging reviewed  No orders to display     24-hour collection in 11/2022 (on HCTZ) with 5.1 L, 189 mg/day of calcium, 724 mg/day of  citric acid, which is in the normal range    24-hour urine in April 2022 (on hydrochlorothiazide) with 4.5 L, 171 mg/day calcium, low citric acid    24-hour urine collection in December 2021 with 7000 mL of urine, electrolyte levels pending    24-hour urine collection April 2021 with 5300 mL, high urine oxalate, low average urine citric acid, 186 mg/day calcium    Repeat 24-hour collection in July 2020 with 6400 mL, urine electrolytes remain pending.      Assessment:  Earnestine Lindsay is a 47 y.o. female with a history of CKD stage II, nephrolithiasis, bipolar II disorder on lithium complicated by diabetes insipidus who presents today for follow up.    Plan:  1. Nephrolithiasis  -Most likely due to calcium oxalate stones given 24-hour urine results.  Recommend continue low oxalate diet.  Continue potassium citrate 30 mEq p.o. twice daily.  Continue hydrochlorothiazide 25 mg p.o. twice daily to reduce urinary calcium.  Hydrochlorothiazide could be increased to 50 mg p.o. twice daily if needed to further reduce urinary calcium in the future.      2. CKD (chronic kidney disease) stage 2-3a  -Stable.  CKD likely from lithium exposure/interstitial nephritis.  Patient remains off of lithium as of March 2022.  Avoid NSAIDs and other nephrotoxins.  Low-salt diet.    3.  Diabetes insipidus.  -Persistent, likely from long-term lithium use.  Patient remains off of lithium as of March 2022, and off of amiloride.  Continue hydrochlorothiazide 25 mg p.o. twice daily, which has reduced her daily urine output from 6 or 7 L down to 5 L a day.    4.  Bipolar disorder  -Stable per patient.  Follow with psychiatry.  Remains off of lithium as of March 2022.    5.  Hypervitaminosis D  - Improved.  Continue over-the-counter vitamin D 5000 units Monday Wednesday Friday.      RTC 12 months with preclinic labs    Trung Schwab MD  Nephrology

## 2022-11-21 PROCEDURE — RXMED WILLOW AMBULATORY MEDICATION CHARGE: Performed by: STUDENT IN AN ORGANIZED HEALTH CARE EDUCATION/TRAINING PROGRAM

## 2022-11-22 ENCOUNTER — DOCUMENTATION (OUTPATIENT)
Dept: PHARMACY | Facility: MEDICAL CENTER | Age: 47
End: 2022-11-22
Payer: COMMERCIAL

## 2022-11-22 NOTE — PROGRESS NOTES
I spoke with pt regarding Emgality refill $0/30. Pt's next dose due on 12/6. Pt continues to do well on medication and reports stopping Sertraline and starting Lexapro. Pt has no questions or concerns. Delivery  scheduled 11/28

## 2022-11-24 DIAGNOSIS — F51.01 PRIMARY INSOMNIA: ICD-10-CM

## 2022-11-28 ENCOUNTER — PHARMACY VISIT (OUTPATIENT)
Dept: PHARMACY | Facility: MEDICAL CENTER | Age: 47
End: 2022-11-28
Payer: COMMERCIAL

## 2022-11-29 NOTE — TELEPHONE ENCOUNTER
Received request via: Pharmacy    Was the patient seen in the last year in this department? Yes    Does the patient have an active prescription (recently filled or refills available) for medication(s) requested? No    Does the patient have custodial Plus and need 100 day supply (blood pressure, diabetes and cholesterol meds only)? Medication is not for cholesterol, blood pressure or diabetes and Patient does not have SCP

## 2022-11-30 RX ORDER — ESZOPICLONE 3 MG/1
TABLET, FILM COATED ORAL
Qty: 30 TABLET | Refills: 1 | Status: SHIPPED | OUTPATIENT
Start: 2022-11-30 | End: 2022-12-30

## 2022-12-12 NOTE — TELEPHONE ENCOUNTER
Received request via: Patient    Was the patient seen in the last year in this department? Yes    Does the patient have an active prescription (recently filled or refills available) for medication(s) requested? No    Does the patient have retirement Plus and need 100 day supply (blood pressure, diabetes and cholesterol meds only)? Medication is not for cholesterol, blood pressure or diabetes

## 2022-12-13 RX ORDER — ESCITALOPRAM OXALATE 10 MG/1
10 TABLET ORAL EVERY MORNING
Qty: 90 TABLET | Refills: 0 | Status: SHIPPED | OUTPATIENT
Start: 2022-12-13 | End: 2023-02-10 | Stop reason: SDUPTHER

## 2022-12-15 RX ORDER — ESCITALOPRAM OXALATE 10 MG/1
TABLET ORAL
Qty: 30 TABLET | Refills: 2 | OUTPATIENT
Start: 2022-12-15 | End: 2023-01-12

## 2022-12-19 PROCEDURE — RXMED WILLOW AMBULATORY MEDICATION CHARGE: Performed by: STUDENT IN AN ORGANIZED HEALTH CARE EDUCATION/TRAINING PROGRAM

## 2022-12-20 ENCOUNTER — DOCUMENTATION (OUTPATIENT)
Dept: PHARMACY | Facility: MEDICAL CENTER | Age: 47
End: 2022-12-20
Payer: COMMERCIAL

## 2022-12-20 ENCOUNTER — PHARMACY VISIT (OUTPATIENT)
Dept: PHARMACY | Facility: MEDICAL CENTER | Age: 47
End: 2022-12-20
Payer: COMMERCIAL

## 2022-12-20 NOTE — PROGRESS NOTES
Spoke with pt regarding Emgality refill. Pt is doing well on medication with no changes to report. No missed doses as it's just 1x monthly. Pt has 0 remaining. Pt had no questions or concerns. Delivery scheduled 12/20.

## 2023-01-10 DIAGNOSIS — G40.909 SEIZURE DISORDER (HCC): ICD-10-CM

## 2023-01-10 RX ORDER — GALCANEZUMAB 120 MG/ML
120 INJECTION, SOLUTION SUBCUTANEOUS
Qty: 1 ML | Refills: 5 | Status: SHIPPED | OUTPATIENT
Start: 2023-01-10 | End: 2023-07-28 | Stop reason: SDUPTHER

## 2023-01-10 NOTE — TELEPHONE ENCOUNTER
Received request via: Pharmacy    Was the patient seen in the last year in this department? No    Does the patient have an active prescription (recently filled or refills available) for medication(s) requested? No    Does the patient have correction Plus and need 100 day supply (blood pressure, diabetes and cholesterol meds only)? Patient does not have SCP

## 2023-01-11 ENCOUNTER — TELEPHONE (OUTPATIENT)
Dept: NEUROLOGY | Facility: MEDICAL CENTER | Age: 48
End: 2023-01-11
Payer: COMMERCIAL

## 2023-01-11 NOTE — TELEPHONE ENCOUNTER
new order from in basket for emgality 120mg, 1/30; ran test claim, copay 40.00; pharmacy:not locked to pharmacy;

## 2023-01-17 ENCOUNTER — PHARMACY VISIT (OUTPATIENT)
Dept: PHARMACY | Facility: MEDICAL CENTER | Age: 48
End: 2023-01-17
Payer: COMMERCIAL

## 2023-01-17 PROCEDURE — RXMED WILLOW AMBULATORY MEDICATION CHARGE: Performed by: PSYCHIATRY & NEUROLOGY

## 2023-01-19 ENCOUNTER — HOSPITAL ENCOUNTER (OUTPATIENT)
Dept: RADIOLOGY | Facility: MEDICAL CENTER | Age: 48
End: 2023-01-19
Payer: COMMERCIAL

## 2023-01-19 DIAGNOSIS — N20.0 CALCULUS OF KIDNEY: ICD-10-CM

## 2023-01-19 PROCEDURE — 74018 RADEX ABDOMEN 1 VIEW: CPT

## 2023-02-03 SDOH — ECONOMIC STABILITY: FOOD INSECURITY: WITHIN THE PAST 12 MONTHS, THE FOOD YOU BOUGHT JUST DIDN'T LAST AND YOU DIDN'T HAVE MONEY TO GET MORE.: NEVER TRUE

## 2023-02-03 SDOH — ECONOMIC STABILITY: HOUSING INSECURITY
IN THE LAST 12 MONTHS, WAS THERE A TIME WHEN YOU DID NOT HAVE A STEADY PLACE TO SLEEP OR SLEPT IN A SHELTER (INCLUDING NOW)?: NO

## 2023-02-03 SDOH — ECONOMIC STABILITY: TRANSPORTATION INSECURITY
IN THE PAST 12 MONTHS, HAS LACK OF RELIABLE TRANSPORTATION KEPT YOU FROM MEDICAL APPOINTMENTS, MEETINGS, WORK OR FROM GETTING THINGS NEEDED FOR DAILY LIVING?: NO

## 2023-02-03 SDOH — ECONOMIC STABILITY: INCOME INSECURITY: IN THE LAST 12 MONTHS, WAS THERE A TIME WHEN YOU WERE NOT ABLE TO PAY THE MORTGAGE OR RENT ON TIME?: NO

## 2023-02-03 SDOH — HEALTH STABILITY: PHYSICAL HEALTH: ON AVERAGE, HOW MANY DAYS PER WEEK DO YOU ENGAGE IN MODERATE TO STRENUOUS EXERCISE (LIKE A BRISK WALK)?: 2 DAYS

## 2023-02-03 SDOH — ECONOMIC STABILITY: FOOD INSECURITY: WITHIN THE PAST 12 MONTHS, YOU WORRIED THAT YOUR FOOD WOULD RUN OUT BEFORE YOU GOT MONEY TO BUY MORE.: NEVER TRUE

## 2023-02-03 SDOH — ECONOMIC STABILITY: TRANSPORTATION INSECURITY
IN THE PAST 12 MONTHS, HAS THE LACK OF TRANSPORTATION KEPT YOU FROM MEDICAL APPOINTMENTS OR FROM GETTING MEDICATIONS?: NO

## 2023-02-03 SDOH — ECONOMIC STABILITY: HOUSING INSECURITY: IN THE LAST 12 MONTHS, HOW MANY PLACES HAVE YOU LIVED?: 1

## 2023-02-03 SDOH — HEALTH STABILITY: MENTAL HEALTH
STRESS IS WHEN SOMEONE FEELS TENSE, NERVOUS, ANXIOUS, OR CAN'T SLEEP AT NIGHT BECAUSE THEIR MIND IS TROUBLED. HOW STRESSED ARE YOU?: TO SOME EXTENT

## 2023-02-03 SDOH — HEALTH STABILITY: PHYSICAL HEALTH: ON AVERAGE, HOW MANY MINUTES DO YOU ENGAGE IN EXERCISE AT THIS LEVEL?: 90 MIN

## 2023-02-03 SDOH — ECONOMIC STABILITY: INCOME INSECURITY: HOW HARD IS IT FOR YOU TO PAY FOR THE VERY BASICS LIKE FOOD, HOUSING, MEDICAL CARE, AND HEATING?: NOT VERY HARD

## 2023-02-03 SDOH — ECONOMIC STABILITY: TRANSPORTATION INSECURITY
IN THE PAST 12 MONTHS, HAS LACK OF TRANSPORTATION KEPT YOU FROM MEETINGS, WORK, OR FROM GETTING THINGS NEEDED FOR DAILY LIVING?: NO

## 2023-02-03 ASSESSMENT — LIFESTYLE VARIABLES
HOW OFTEN DO YOU HAVE A DRINK CONTAINING ALCOHOL: NEVER
SKIP TO QUESTIONS 9-10: 1
HOW MANY STANDARD DRINKS CONTAINING ALCOHOL DO YOU HAVE ON A TYPICAL DAY: PATIENT DOES NOT DRINK
HOW OFTEN DO YOU HAVE SIX OR MORE DRINKS ON ONE OCCASION: NEVER
AUDIT-C TOTAL SCORE: 0

## 2023-02-03 ASSESSMENT — SOCIAL DETERMINANTS OF HEALTH (SDOH)
HOW OFTEN DO YOU HAVE A DRINK CONTAINING ALCOHOL: NEVER
WITHIN THE PAST 12 MONTHS, YOU WORRIED THAT YOUR FOOD WOULD RUN OUT BEFORE YOU GOT THE MONEY TO BUY MORE: NEVER TRUE
HOW HARD IS IT FOR YOU TO PAY FOR THE VERY BASICS LIKE FOOD, HOUSING, MEDICAL CARE, AND HEATING?: NOT VERY HARD
HOW MANY DRINKS CONTAINING ALCOHOL DO YOU HAVE ON A TYPICAL DAY WHEN YOU ARE DRINKING: PATIENT DOES NOT DRINK
HOW OFTEN DO YOU ATTEND CHURCH OR RELIGIOUS SERVICES?: MORE THAN 4 TIMES PER YEAR
IN A TYPICAL WEEK, HOW MANY TIMES DO YOU TALK ON THE PHONE WITH FAMILY, FRIENDS, OR NEIGHBORS?: MORE THAN THREE TIMES A WEEK
IN A TYPICAL WEEK, HOW MANY TIMES DO YOU TALK ON THE PHONE WITH FAMILY, FRIENDS, OR NEIGHBORS?: MORE THAN THREE TIMES A WEEK
HOW OFTEN DO YOU ATTEND CHURCH OR RELIGIOUS SERVICES?: MORE THAN 4 TIMES PER YEAR
HOW OFTEN DO YOU GET TOGETHER WITH FRIENDS OR RELATIVES?: ONCE A WEEK
HOW OFTEN DO YOU HAVE SIX OR MORE DRINKS ON ONE OCCASION: NEVER
HOW OFTEN DO YOU GET TOGETHER WITH FRIENDS OR RELATIVES?: ONCE A WEEK
HOW OFTEN DO YOU ATTENT MEETINGS OF THE CLUB OR ORGANIZATION YOU BELONG TO?: MORE THAN 4 TIMES PER YEAR
HOW OFTEN DO YOU ATTENT MEETINGS OF THE CLUB OR ORGANIZATION YOU BELONG TO?: MORE THAN 4 TIMES PER YEAR
DO YOU BELONG TO ANY CLUBS OR ORGANIZATIONS SUCH AS CHURCH GROUPS UNIONS, FRATERNAL OR ATHLETIC GROUPS, OR SCHOOL GROUPS?: YES
DO YOU BELONG TO ANY CLUBS OR ORGANIZATIONS SUCH AS CHURCH GROUPS UNIONS, FRATERNAL OR ATHLETIC GROUPS, OR SCHOOL GROUPS?: YES

## 2023-02-06 ENCOUNTER — OFFICE VISIT (OUTPATIENT)
Dept: MEDICAL GROUP | Facility: IMAGING CENTER | Age: 48
End: 2023-02-06
Payer: COMMERCIAL

## 2023-02-06 VITALS
TEMPERATURE: 98.6 F | OXYGEN SATURATION: 97 % | DIASTOLIC BLOOD PRESSURE: 60 MMHG | HEART RATE: 73 BPM | SYSTOLIC BLOOD PRESSURE: 102 MMHG | WEIGHT: 156.8 LBS | HEIGHT: 64 IN | BODY MASS INDEX: 26.77 KG/M2 | RESPIRATION RATE: 16 BRPM

## 2023-02-06 DIAGNOSIS — R79.89 HIGH SERUM TESTOSTERONE: ICD-10-CM

## 2023-02-06 DIAGNOSIS — G43.109 MIGRAINE WITH AURA AND WITHOUT STATUS MIGRAINOSUS, NOT INTRACTABLE: ICD-10-CM

## 2023-02-06 DIAGNOSIS — R92.8 ABNORMAL FINDING ON BREAST IMAGING: ICD-10-CM

## 2023-02-06 DIAGNOSIS — Z11.59 NEED FOR HEPATITIS C SCREENING TEST: ICD-10-CM

## 2023-02-06 DIAGNOSIS — E28.0 ESTROGEN EXCESS: ICD-10-CM

## 2023-02-06 DIAGNOSIS — Z23 NEED FOR VACCINATION: ICD-10-CM

## 2023-02-06 DIAGNOSIS — E03.9 HYPOTHYROIDISM, UNSPECIFIED TYPE: ICD-10-CM

## 2023-02-06 DIAGNOSIS — G40.909 SEIZURE DISORDER (HCC): ICD-10-CM

## 2023-02-06 DIAGNOSIS — Z12.12 SCREENING FOR COLORECTAL CANCER: ICD-10-CM

## 2023-02-06 DIAGNOSIS — N20.0 NEPHROLITHIASIS: Chronic | ICD-10-CM

## 2023-02-06 DIAGNOSIS — K76.0 NONALCOHOLIC FATTY LIVER DISEASE: ICD-10-CM

## 2023-02-06 DIAGNOSIS — G89.4 CHRONIC PAIN SYNDROME: ICD-10-CM

## 2023-02-06 DIAGNOSIS — Z85.41 HISTORY OF MALIGNANT NEOPLASM OF CERVIX: ICD-10-CM

## 2023-02-06 DIAGNOSIS — Z12.11 SCREENING FOR COLORECTAL CANCER: ICD-10-CM

## 2023-02-06 DIAGNOSIS — F41.1 GENERALIZED ANXIETY DISORDER: ICD-10-CM

## 2023-02-06 DIAGNOSIS — F31.9 BIPOLAR AFFECTIVE DISORDER, REMISSION STATUS UNSPECIFIED (HCC): Chronic | ICD-10-CM

## 2023-02-06 DIAGNOSIS — Z76.89 ENCOUNTER TO ESTABLISH CARE WITH NEW DOCTOR: ICD-10-CM

## 2023-02-06 DIAGNOSIS — Z51.81 ENCOUNTER FOR MONITORING DIURETIC THERAPY: ICD-10-CM

## 2023-02-06 DIAGNOSIS — Z79.899 ENCOUNTER FOR MONITORING DIURETIC THERAPY: ICD-10-CM

## 2023-02-06 DIAGNOSIS — N18.2 CKD (CHRONIC KIDNEY DISEASE) STAGE 2, GFR 60-89 ML/MIN: ICD-10-CM

## 2023-02-06 DIAGNOSIS — E34.9 ABNORMALITY OF HORMONE: ICD-10-CM

## 2023-02-06 DIAGNOSIS — N25.1 DIABETES INSIPIDUS, NEPHROGENIC (HCC): Chronic | ICD-10-CM

## 2023-02-06 PROBLEM — E66.9 OBESITY: Status: RESOLVED | Noted: 2020-11-11 | Resolved: 2023-02-06

## 2023-02-06 PROBLEM — R32 URINARY INCONTINENCE: Status: RESOLVED | Noted: 2020-11-11 | Resolved: 2023-02-06

## 2023-02-06 PROCEDURE — 90471 IMMUNIZATION ADMIN: CPT | Performed by: CLINICAL NURSE SPECIALIST

## 2023-02-06 PROCEDURE — 90746 HEPB VACCINE 3 DOSE ADULT IM: CPT | Performed by: CLINICAL NURSE SPECIALIST

## 2023-02-06 PROCEDURE — 99204 OFFICE O/P NEW MOD 45 MIN: CPT | Mod: 25 | Performed by: CLINICAL NURSE SPECIALIST

## 2023-02-06 RX ORDER — PROGESTERONE 100 MG/1
CAPSULE ORAL
COMMUNITY
Start: 2023-01-14 | End: 2023-10-30

## 2023-02-06 RX ORDER — NEEDLES, DISPOSABLE 25GX5/8"
1 NEEDLE, DISPOSABLE MISCELLANEOUS
Qty: 10 EACH | Refills: 2 | Status: SHIPPED | OUTPATIENT
Start: 2023-02-06 | End: 2023-07-31 | Stop reason: CLARIF

## 2023-02-06 RX ORDER — ESZOPICLONE 3 MG/1
3 TABLET, FILM COATED ORAL
COMMUNITY
Start: 2023-01-09 | End: 2023-02-10

## 2023-02-06 ASSESSMENT — PAIN SCALES - GENERAL: PAINLEVEL: NO PAIN

## 2023-02-06 NOTE — ASSESSMENT & PLAN NOTE
Earnestine was told this by previous primary care.  She has improved her diet. She exercises sometimes but needs to do more.

## 2023-02-06 NOTE — ASSESSMENT & PLAN NOTE
Drinks and urinates frequently.  This issue is believed to be from long term lithium use.  Taking potassium 15mEQ two tabs twice daily and HCTZ 25mg twice daily.

## 2023-02-06 NOTE — ASSESSMENT & PLAN NOTE
Taking lamotrigine 200mg twice daily managed by neurology.  No recent seizures.  Last seizure 7-8 months ago.

## 2023-02-06 NOTE — ASSESSMENT & PLAN NOTE
Seeing psychiatry but no therapy.  Taking escitalopram 10mg daily, Vraylar 4.5mg, lamotrigine, and Xanax 0.5mg as needed. This is working well. Was on lithium but off now.  She has been having shakes in the morning since starting the Vraylar.  She also takes lamotrigine for seizures.

## 2023-02-06 NOTE — ASSESSMENT & PLAN NOTE
Pt reported. Taking testosterone and progesterone previously prescribed by PCP. Last testosterone level elevated. No recent estrogen levels.  Has had migraines since 20's.

## 2023-02-06 NOTE — ASSESSMENT & PLAN NOTE
Last GFR 73.  Seeing Dr. Schwab nephrologist.  Taking HCTZ 25mg twice daily and potasium citrate 30mEq twice daily.

## 2023-02-06 NOTE — ASSESSMENT & PLAN NOTE
Wakes with pain most days mostly in neck and back since falling approximately 15 year ago.  Using cyclobenzaprine as needed for this.  If really bad, will take a Percocet but not for a while.  Uses heat as needed.  Chiropractor did not provide pain relief.

## 2023-02-06 NOTE — PROGRESS NOTES
Subjective     Earnestine Lindsay is a 47 y.o. female who presents with Establish Care, Medication Management, and Referral Needed (For a mammogram )            HPI  Bipolar disorder (HCC)  Seeing psychiatry but no therapy.  Taking escitalopram 10mg daily, Vraylar 4.5mg, lamotrigine, and Xanax 0.5mg as needed. This is working well. Was on lithium but off now.  She has been having shakes in the morning since starting the Vraylar.  She also takes lamotrigine for seizures.     Chronic pain syndrome  Wakes with pain most days mostly in neck and back since falling approximately 15 year ago.  Using cyclobenzaprine as needed for this.  If really bad, will take a Percocet but not for a while.  Uses heat as needed.  Chiropractor did not provide pain relief.      CKD (chronic kidney disease) stage 2, GFR 60-89 ml/min  Last GFR 73.  Seeing Dr. Schwab nephrologist.  Taking HCTZ 25mg twice daily and potasium citrate 30mEq twice daily.    Diabetes insipidus, nephrogenic - presumed  Drinks and urinates frequently.  This issue is believed to be from long term lithium use.  Taking potassium 15mEQ two tabs twice daily and HCTZ 25mg twice daily.    Generalized anxiety disorder  Taking Lexapro 10mg and Xanax as needed which tolerates well.     Migraine  Taking Emgality for migraines managed by neurology.  Controlling pretty well.      Nephrolithiasis  Takes tamsulosin, Percocet, Dilaudid as needed for exacerbations of pain. Managed by Dr. Schwab.    History of malignant neoplasm of cervix  Dx at 25 yo.  Has had hysterectomy since    Hypothyroidism  Taking levothyroxine 75mcg and Cytomel 5mg. Previously managed by PCP.      Nonalcoholic fatty liver disease  Earnestine was told this by previous primary care.  She has improved her diet. She exercises sometimes but needs to do more.     Seizure disorder (HCC)  Taking lamotrigine 200mg twice daily managed by neurology.  No recent seizures.  Last seizure 7-8 months ago.     Estrogen  excess  Pt reported. Taking testosterone and progesterone previously prescribed by PCP. Last testosterone level elevated. No recent estrogen levels.  Has had migraines since 20's.      ROS  See HPI    Allergies   Allergen Reactions    Promethazine Hives     Current Outpatient Medications on File Prior to Visit   Medication Sig Dispense Refill    Galcanezumab-gnlm (EMGALITY) 120 MG/ML Solution Prefilled Syringe Inject 120 mg under the skin every 30 DAYS. 1 mL 5    escitalopram (LEXAPRO) 10 MG Tab Take 1 Tablet by mouth every morning for 90 days. 90 Tablet 0    lamotrigine (LAMICTAL) 200 MG tablet TAKE 1 TABLET BY MOUTH EVERY MORNING THEN TAKE 1 TABLET BY MOUTH IN THE EVENING      Potassium Citrate 15 MEQ (1620 MG) Tab CR TAKE 2 TABLETS BY MOUTH 2 TIMES A DAY. 360 Tablet 3    Cariprazine HCl (VRAYLAR) 4.5 MG Cap Take 4.5 mg by mouth every day for 180 days. 90 Capsule 1    cholecalciferol (VITAMIN D3) 5000 UNIT Cap Take 1 Capsule by mouth every Monday, Wednesday, and Friday.      testosterone cypionate (DEPO-TESTOSTERONE) 200 MG/ML Solution injection INJECT 0.1 ML INTRAMUSCULARLY WEEKLY DX: N95.1      hydroCHLOROthiazide (HYDRODIURIL) 25 MG Tab Take 1 Tablet by mouth 2 times a day. 180 Tablet 3    tamsulosin (FLOMAX) 0.4 MG capsule TAKE 1 CAPSULE BY MOUTH EVERY DAY. TAKE DAILY DURING EPISODES OF KIDNEY STONE PAIN. 90 Capsule 1    oxyCODONE-Acetaminophen (PERCOCET PO) Take  by mouth.      albuterol 108 (90 Base) MCG/ACT Aero Soln inhalation aerosol 90 Puffs.      ALPRAZolam (XANAX) 0.5 MG Tab 1 Tablet.      cyclobenzaprine (FLEXERIL) 10 mg Tab 1 Tablet.      dicyclomine (BENTYL) 20 MG Tab 1 Tablet.      levothyroxine (SYNTHROID) 75 MCG Tab 1 Tablet.      liothyronine (CYTOMEL) 5 MCG Tab 0.005 mcg.      ondansetron (ZOFRAN ODT) 4 MG TABLET DISPERSIBLE 1 Tablet.      HYDROmorphone (DILAUDID) 4 MG Tab hydromorphone 4 mg tablet      Eszopiclone 3 MG Tab Take 3 mg by mouth at bedtime as needed.      progesterone (PROMETRIUM)  "100 MG Cap TAKE 1 CAPSULE BY MOUTH @ BEDTIME      Spacer/Aero-Holding Chambers (AEROCHAMBER MAX W/FLOW-VU) Misc Aerochamber Plus Flow-Vu       No current facility-administered medications on file prior to visit.              Objective     /60 (BP Location: Left arm, Patient Position: Sitting, BP Cuff Size: Adult)   Pulse 73   Temp 37 °C (98.6 °F) (Temporal)   Resp 16   Ht 1.626 m (5' 4\")   Wt 71.1 kg (156 lb 12.8 oz)   SpO2 97%   BMI 26.91 kg/m²      Physical Exam  Constitutional:       General: She is not in acute distress.     Appearance: Normal appearance. She is not ill-appearing, toxic-appearing or diaphoretic.   HENT:      Head: Normocephalic and atraumatic.   Eyes:      General: No scleral icterus.     Extraocular Movements: Extraocular movements intact.      Pupils: Pupils are equal, round, and reactive to light.   Cardiovascular:      Rate and Rhythm: Normal rate and regular rhythm.      Heart sounds: Normal heart sounds.   Pulmonary:      Effort: Pulmonary effort is normal.      Breath sounds: Normal breath sounds.   Skin:     General: Skin is warm and dry.   Neurological:      Mental Status: She is alert and oriented to person, place, and time.      Gait: Gait normal.   Psychiatric:         Mood and Affect: Mood normal.         Behavior: Behavior normal.         Thought Content: Thought content normal.         Judgment: Judgment normal.          No visits with results within 1 Month(s) from this visit.   Latest known visit with results is:   Hospital Outpatient Visit on 11/03/2022   Component Date Value    Collection Length 11/03/2022 24     Total Volume 11/03/2022 5100     Ph 11/03/2022 7.36     Calcium Random Urine 11/03/2022 3.7     Calcium Urine 11/03/2022 189     Oxalates, Urine 11/03/2022 11     Oxalates, 24 Hour Urine 11/03/2022 56 (H)     Sodium, Urine -per volume 11/03/2022 51     Sodium, Urine-per 24h 11/03/2022 260     Uric Acid, Random Urine 11/03/2022 11.7     Uric Acid 24H Urine " 11/03/2022 597     Citric Acid Urine mg/dl 11/03/2022 142     Citric Acid Urine mg/24hr 11/03/2022 724     Magnesium, Urine-per vol* 11/03/2022 6.5     Magnesium, Urine per 24h 11/03/2022 332 (H)     Phosphorus, Urine-per vo* 11/03/2022 20     Phosphorus, Urine-per 24h 11/03/2022 1020     Potassium, Urine-per vol* 11/03/2022 38     Potassium, Urine-per 24h 11/03/2022 194 (H)     Chloride, Urine-per volu* 11/03/2022 46     Chloride, Urine-per 24h 11/03/2022 235     Creatinine Urine 11/03/2022 35     Creatinine, Random Urine 11/03/2022 1785 (H)     EER Calculi Risk Asses P* 11/03/2022 See Note                           Assessment & Plan      1. Encounter to establish care with new doctor  Earnestine's last provider no longer takes her insurance.  She is establishing care today.  Social, family history not addressed today but will need to be addressed in the future.    2. Bipolar affective disorder, remission status unspecified (HCC)  Chronic, controlled.  Taking Vraylar, escitalopram, lamotrigine and Xanax.  Defer management to psychiatry.  - Lipid Profile; Future    3. Chronic pain syndrome  Chronic, partially controlled.  Continue medication as below.  Referral to acupuncture placed.  She has a diagnosis of osteoarthritis but does not recall anybody saying this to her.    CONTINUE cyclobenzaprine 10 mg as needed for muscle spasm  CONTINUE OTC analgesics as needed    - Referral for Acupuncture    4. CKD (chronic kidney disease) stage 2, GFR 60-89 ml/min  Chronic, improved on last lab.  Defer management to nephrology.    5. Diabetes insipidus, nephrogenic - presumed  Chronic, managed by nephrology.    START Calm magnesium nightly per package instructions    6. Generalized anxiety disorder  Chronic, controlled.  Managed by psychiatry.    7. Migraine with aura and without status migrainosus, not intractable  Chronic, generally controlled.  Defer overall management to neurology.    START Calm magnesium nightly per package  instructions    8. Nephrolithiasis  Chronic, with intermittent exacerbations.  Defer overall management to nephrology.  See below for pain management.  She is also taking Dilaudid PRN.  If she would like refills on this, I would prefer she sees pain management or have this supplied by Dr. Schwab.    CONTINUE tamsulosin 0.4 mg as needed for exacerbation   CONTINUE Percocet as needed for pain    9. History of malignant neoplasm of cervix  Hysterectomy complete    10. Hypothyroidism, unspecified type  Chronic, unknown control.  Last lab 1 year ago and TSH low.  She will get labs and we will manage levothyroxine once resulted.  Referral to endocrinology also placed.  Ultrasound thyroid ordered.    - TSH; Future  - FREE THYROXINE; Future  - TRIIDOTHYRONINE; Future  - THYROID PEROX AB TPO (REFLEX); Future  - ANTITHYROGLOBULIN AB; Future  - US-THYROID; Future    11. Encounter for monitoring diuretic therapy  Earnestine has diabetes insipidus and is on hydrochlorothiazide.  She takes potassium regularly.  We will check magnesium.  - MAGNESIUM; Future    12. Nonalcoholic fatty liver disease  Chronic, unknown control.  She reports healthier dietary habits but could use more exercise.  Monitor.  - Lipid Profile; Future    13. Seizure disorder (HCC)  Chronic, no seizures in approximately 8 months.  Defer management to neurology.    14. Need for hepatitis C screening test    - HCV Scrn ( [id number] [employer]); Future    15. Screening for colorectal cancer  She has had 1 colonoscopy approximately 15 years ago  - Referral to GI for Colonoscopy    16. Need for vaccination  Dose 3 today  - Hepatitis B Vaccine Adult 20+    17. Abnormal finding on breast imaging  2022 abnormal image.  Follow-up imaging ordered.    - MA-DIAGNOSTIC MAMMO LEFT W/TOMOSYNTHESIS W/O CAD; Future  - US-BREAST LIMITED-LEFT; Future    18. Abnormality of hormone  Patient reported.  She reports she had elevated estrogen levels and has been taking testosterone  "weekly.  Labs ordered and referral to endocrinology placed.    - TESTOSTERONE F&T FEMALES/CHILD; Future  - ESTROGENS FRACTIONATED; Future  - Referral to Endocrinology    19. High serum testosterone  High serum testosterone on last lab related to exogenous replacement.  She reports taking testosterone for elevated estrogen.  Last labs show high testosterone levels which can increase her risk of cardiovascular disease.  Fortunately her blood pressures normal today and her last hematocrit and hemoglobin were within healthy range.  She has a history of chronic migraines but has not noticed a difference in these with testosterone replacement.  Recheck hormone levels.    - TESTOSTERONE F&T FEMALES/CHILD; Future  - ESTROGENS FRACTIONATED; Future  - Referral to Endocrinology    20. Estrogen excess  Patient reported.  She states that pharmacy requires an order for the IM needles now.  Needles ordered.    - NEEDLE, DISP, 22 G (BD DISP NEEDLES) 22G X 1-1/2\" Misc; 1 Each every 7 days.  Dispense: 10 Each; Refill: 2       Return if symptoms worsen or fail to improve, for With test results.    The patient verbalized agreement and understanding of the current plan. All questions and concerns were addressed at the time of visit.    This note was dictated using Dragon Software.  I have made every reasonable attempt to correct errors, but errors of grammar and content may still exist.          "

## 2023-02-10 ENCOUNTER — OFFICE VISIT (OUTPATIENT)
Dept: BEHAVIORAL HEALTH | Facility: CLINIC | Age: 48
End: 2023-02-10
Payer: COMMERCIAL

## 2023-02-10 DIAGNOSIS — F51.01 PRIMARY INSOMNIA: ICD-10-CM

## 2023-02-10 DIAGNOSIS — F12.10 CANNABIS ABUSE: ICD-10-CM

## 2023-02-10 DIAGNOSIS — F31.62 BIPOLAR DISORDER, CURRENT EPISODE MIXED, MODERATE (HCC): ICD-10-CM

## 2023-02-10 DIAGNOSIS — Z79.899 LONG TERM CURRENT USE OF ANTIPSYCHOTIC MEDICATION: ICD-10-CM

## 2023-02-10 PROCEDURE — 99214 OFFICE O/P EST MOD 30 MIN: CPT | Performed by: PSYCHIATRY & NEUROLOGY

## 2023-02-10 RX ORDER — ESCITALOPRAM OXALATE 10 MG/1
10 TABLET ORAL EVERY MORNING
Qty: 90 TABLET | Refills: 0 | Status: SHIPPED | OUTPATIENT
Start: 2023-02-10 | End: 2023-03-24 | Stop reason: SDUPTHER

## 2023-02-10 RX ORDER — ESZOPICLONE 2 MG/1
2 TABLET, FILM COATED ORAL
Qty: 30 TABLET | Refills: 0 | Status: SHIPPED | OUTPATIENT
Start: 2023-02-10 | End: 2023-03-10

## 2023-02-10 RX ORDER — CARIPRAZINE 4.5 MG/1
4.5 CAPSULE, GELATIN COATED ORAL DAILY
Qty: 90 CAPSULE | Refills: 0 | Status: SHIPPED | OUTPATIENT
Start: 2023-02-10 | End: 2023-03-24

## 2023-02-10 ASSESSMENT — ENCOUNTER SYMPTOMS
PREOCCUPATION: 0
HYPERSOMNIA: 0
AGGRESSION: 0
RESPIRATORY NEGATIVE: 1
THOUGHT CONTENT - SHAME: 0
PSYCHIATRIC NEGATIVE: 1
DELUSIONS: 0
FREQUENT AWAKENING: 1
PANIC: 0
GASTROINTESTINAL NEGATIVE: 1
DIFFICULTY FALLING ASLEEP: 0
MUSCULOSKELETAL NEGATIVE: 1
EYES NEGATIVE: 1
PARANOIA: 0
DECREASED APPETITE: 0
HYPERVIGILANCE: 0
COMPULSIONS: 0
HYPERSEXUAL: 0
CONSTITUTIONAL NEGATIVE: 1
CARDIOVASCULAR NEGATIVE: 1
EUPHORIC MOOD: 0
AWAKENING FROM SLEEP: 1
NEUROLOGICAL NEGATIVE: 1
HOPELESSNESS: 0
DECREASED ENERGY: 1
AGORAPHOBIA: 0
THOUGHT CONTENT - EXCESSIVE UNREASONABLE FEAR: 0
DECREASED NEED FOR SLEEP: 0

## 2023-02-10 ASSESSMENT — ANXIETY QUESTIONNAIRES
7. FEELING AFRAID AS IF SOMETHING AWFUL MIGHT HAPPEN: NOT AT ALL
5. BEING SO RESTLESS THAT IT IS HARD TO SIT STILL: NOT AT ALL
2. NOT BEING ABLE TO STOP OR CONTROL WORRYING: NOT AT ALL
3. WORRYING TOO MUCH ABOUT DIFFERENT THINGS: NOT AT ALL
4. TROUBLE RELAXING: NOT AT ALL
IF YOU CHECKED OFF ANY PROBLEMS ON THIS QUESTIONNAIRE, HOW DIFFICULT HAVE THESE PROBLEMS MADE IT FOR YOU TO DO YOUR WORK, TAKE CARE OF THINGS AT HOME, OR GET ALONG WITH OTHER PEOPLE: NOT DIFFICULT AT ALL
1. FEELING NERVOUS, ANXIOUS, OR ON EDGE: SEVERAL DAYS
GAD7 TOTAL SCORE: 1
6. BECOMING EASILY ANNOYED OR IRRITABLE: NOT AT ALL

## 2023-02-10 ASSESSMENT — PATIENT HEALTH QUESTIONNAIRE - PHQ9
CLINICAL INTERPRETATION OF PHQ2 SCORE: 1
5. POOR APPETITE OR OVEREATING: 0 - NOT AT ALL
SUM OF ALL RESPONSES TO PHQ QUESTIONS 1-9: 2

## 2023-02-10 ASSESSMENT — SOCIAL DETERMINANTS OF HEALTH (SDOH): ANXIETY ASSOCIATED WITH SOCIAL SUPPORT SYSTEM: 0
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
## 2023-02-13 RX ORDER — HYDROCHLOROTHIAZIDE 25 MG/1
TABLET ORAL
Qty: 180 TABLET | Refills: 3 | Status: SHIPPED | OUTPATIENT
Start: 2023-02-13 | End: 2023-12-14

## 2023-02-14 ENCOUNTER — HOSPITAL ENCOUNTER (OUTPATIENT)
Dept: LAB | Facility: MEDICAL CENTER | Age: 48
End: 2023-02-14
Attending: CLINICAL NURSE SPECIALIST
Payer: COMMERCIAL

## 2023-02-14 DIAGNOSIS — R79.89 HIGH SERUM TESTOSTERONE: ICD-10-CM

## 2023-02-14 DIAGNOSIS — Z11.59 NEED FOR HEPATITIS C SCREENING TEST: ICD-10-CM

## 2023-02-14 DIAGNOSIS — F31.9 BIPOLAR AFFECTIVE DISORDER, REMISSION STATUS UNSPECIFIED (HCC): Chronic | ICD-10-CM

## 2023-02-14 DIAGNOSIS — E03.9 HYPOTHYROIDISM, UNSPECIFIED TYPE: ICD-10-CM

## 2023-02-14 DIAGNOSIS — E34.9 ABNORMALITY OF HORMONE: ICD-10-CM

## 2023-02-14 DIAGNOSIS — Z79.899 ENCOUNTER FOR MONITORING DIURETIC THERAPY: ICD-10-CM

## 2023-02-14 DIAGNOSIS — Z51.81 ENCOUNTER FOR MONITORING DIURETIC THERAPY: ICD-10-CM

## 2023-02-14 DIAGNOSIS — K76.0 NONALCOHOLIC FATTY LIVER DISEASE: ICD-10-CM

## 2023-02-14 LAB
CHOLEST SERPL-MCNC: 177 MG/DL (ref 100–199)
FASTING STATUS PATIENT QL REPORTED: NORMAL
HCV AB SER QL: NORMAL
HDLC SERPL-MCNC: 47 MG/DL
LDLC SERPL CALC-MCNC: 113 MG/DL
MAGNESIUM SERPL-MCNC: 2 MG/DL (ref 1.5–2.5)
T3 SERPL-MCNC: 100 NG/DL (ref 60–181)
T4 FREE SERPL-MCNC: 1.14 NG/DL (ref 0.93–1.7)
THYROPEROXIDASE AB SERPL-ACNC: <9 IU/ML (ref 0–9)
TRIGL SERPL-MCNC: 84 MG/DL (ref 0–149)
TSH SERPL DL<=0.005 MIU/L-ACNC: 0.36 UIU/ML (ref 0.38–5.33)

## 2023-02-14 PROCEDURE — 84443 ASSAY THYROID STIM HORMONE: CPT

## 2023-02-14 PROCEDURE — 84480 ASSAY TRIIODOTHYRONINE (T3): CPT

## 2023-02-14 PROCEDURE — RXMED WILLOW AMBULATORY MEDICATION CHARGE: Performed by: PSYCHIATRY & NEUROLOGY

## 2023-02-14 PROCEDURE — 82671 ASSAY OF ESTROGENS: CPT

## 2023-02-14 PROCEDURE — 84439 ASSAY OF FREE THYROXINE: CPT

## 2023-02-14 PROCEDURE — 84403 ASSAY OF TOTAL TESTOSTERONE: CPT

## 2023-02-14 PROCEDURE — 80061 LIPID PANEL: CPT

## 2023-02-14 PROCEDURE — 86800 THYROGLOBULIN ANTIBODY: CPT

## 2023-02-14 PROCEDURE — 86803 HEPATITIS C AB TEST: CPT

## 2023-02-14 PROCEDURE — 86376 MICROSOMAL ANTIBODY EACH: CPT

## 2023-02-14 PROCEDURE — 84402 ASSAY OF FREE TESTOSTERONE: CPT

## 2023-02-14 PROCEDURE — 83735 ASSAY OF MAGNESIUM: CPT

## 2023-02-14 PROCEDURE — 84270 ASSAY OF SEX HORMONE GLOBUL: CPT

## 2023-02-14 PROCEDURE — 36415 COLL VENOUS BLD VENIPUNCTURE: CPT

## 2023-02-15 ENCOUNTER — DOCUMENTATION (OUTPATIENT)
Dept: PHARMACY | Facility: MEDICAL CENTER | Age: 48
End: 2023-02-15
Payer: COMMERCIAL

## 2023-02-15 NOTE — PROGRESS NOTES
02/14/23: Spoke with [MARIO]. She is doing well on the [Emgality 120mg/ml]. She reports no side effects to the medication and no new allergies. She reports 0 missed doses and has about [0] on hand, due to ink 03/08. Sending delivery for [02/22] via . No supplies needed no questions for [facility]

## 2023-02-17 LAB
ESTRADIOL SERPL HS-MCNC: 11 PG/ML
ESTROGEN SERPL CALC-MCNC: 32.8 PG/ML
ESTRONE SERPL-MCNC: 21.8 PG/ML

## 2023-02-19 LAB
SHBG SERPL-SCNC: 24 NMOL/L (ref 25–122)
TESTOST FREE SERPL-MCNC: 124.5 PG/ML (ref 1.1–5.8)
TESTOST SERPL-MCNC: 556 NG/DL (ref 9–55)

## 2023-02-20 LAB — THYROGLOB AB SERPL-ACNC: <0.9 IU/ML (ref 0–4)

## 2023-02-21 ENCOUNTER — HOSPITAL ENCOUNTER (OUTPATIENT)
Dept: RADIOLOGY | Facility: MEDICAL CENTER | Age: 48
End: 2023-02-21
Attending: CLINICAL NURSE SPECIALIST
Payer: COMMERCIAL

## 2023-02-21 DIAGNOSIS — R92.8 ABNORMAL FINDING ON BREAST IMAGING: ICD-10-CM

## 2023-02-21 PROCEDURE — G0279 TOMOSYNTHESIS, MAMMO: HCPCS

## 2023-02-22 ENCOUNTER — APPOINTMENT (OUTPATIENT)
Dept: MEDICAL GROUP | Facility: IMAGING CENTER | Age: 48
End: 2023-02-22
Payer: COMMERCIAL

## 2023-02-22 ENCOUNTER — PHARMACY VISIT (OUTPATIENT)
Dept: PHARMACY | Facility: MEDICAL CENTER | Age: 48
End: 2023-02-22
Payer: COMMERCIAL

## 2023-02-28 ENCOUNTER — APPOINTMENT (OUTPATIENT)
Dept: NEUROLOGY | Facility: MEDICAL CENTER | Age: 48
End: 2023-02-28
Attending: STUDENT IN AN ORGANIZED HEALTH CARE EDUCATION/TRAINING PROGRAM
Payer: COMMERCIAL

## 2023-03-02 ENCOUNTER — OFFICE VISIT (OUTPATIENT)
Dept: NEUROLOGY | Facility: MEDICAL CENTER | Age: 48
End: 2023-03-02
Attending: STUDENT IN AN ORGANIZED HEALTH CARE EDUCATION/TRAINING PROGRAM
Payer: COMMERCIAL

## 2023-03-02 VITALS
RESPIRATION RATE: 18 BRPM | DIASTOLIC BLOOD PRESSURE: 82 MMHG | OXYGEN SATURATION: 98 % | TEMPERATURE: 98.7 F | HEART RATE: 82 BPM | BODY MASS INDEX: 26.72 KG/M2 | WEIGHT: 156.53 LBS | HEIGHT: 64 IN | SYSTOLIC BLOOD PRESSURE: 122 MMHG

## 2023-03-02 DIAGNOSIS — E03.9 HYPOTHYROIDISM, UNSPECIFIED TYPE: ICD-10-CM

## 2023-03-02 DIAGNOSIS — G40.909 SEIZURE DISORDER (HCC): ICD-10-CM

## 2023-03-02 DIAGNOSIS — G43.809 OTHER MIGRAINE WITHOUT STATUS MIGRAINOSUS, NOT INTRACTABLE: ICD-10-CM

## 2023-03-02 DIAGNOSIS — F41.1 GENERALIZED ANXIETY DISORDER: ICD-10-CM

## 2023-03-02 DIAGNOSIS — F31.9 BIPOLAR AFFECTIVE DISORDER, REMISSION STATUS UNSPECIFIED (HCC): ICD-10-CM

## 2023-03-02 DIAGNOSIS — G89.4 CHRONIC PAIN SYNDROME: ICD-10-CM

## 2023-03-02 DIAGNOSIS — G25.2 INTENTION TREMOR: ICD-10-CM

## 2023-03-02 PROCEDURE — 99212 OFFICE O/P EST SF 10 MIN: CPT | Performed by: STUDENT IN AN ORGANIZED HEALTH CARE EDUCATION/TRAINING PROGRAM

## 2023-03-02 PROCEDURE — 99213 OFFICE O/P EST LOW 20 MIN: CPT | Performed by: STUDENT IN AN ORGANIZED HEALTH CARE EDUCATION/TRAINING PROGRAM

## 2023-03-02 ASSESSMENT — VISUAL ACUITY: VA_NORMAL: 1

## 2023-03-02 NOTE — PATIENT INSTRUCTIONS
NEUROLOGY CLINIC VISIT WITH DR. BRUMFIELD       PATIENT EXPECTATIONS AND IMPORTANT APPOINTMENT REMINDERS:   You matter to Dr. Brumfield and you deserve the best care.   Dr. Brumfield prides himself on providing the best possible care to all his patients. He strives to make each appointment meaningful, so that all your concerns are being addressed and all your neurological problems are being optimally treated. In order to achieve these goals for everyone, Dr. Brumfield has listed important reminders and the best ways to prepare for each appointment. Please read each item carefully. Thank you!    REFILLS:   Request refills AT LEAST 1 week in advance to ensure you do not run out of medications    [employer]  It is STRONGLY encouraged that ALL patients sign up for [employer]. It is BY FAR the fastest and most convenient way for both Dr. Brumfield and patients to obtain timely refills.  If you are having trouble signing up or logging into your account, staff are available to help you. Please ask a medical assistant or staff at the  to assist you.    TEST RESULS:   All labs and diagnostic test results will be reviewed at your next visit, UNLESS  Dr. Brumfield determines that there are important findings on the tests need to be acted on sooner. Dr. Brumfield will either call or send a message through [employer] if this is the case.    BE PREPARED PRIOR TO EVERY APPOINTMENT:  All patient are responsible for ensuring that ALL test results that were completed outside of the [employer] system have been received by our Neurology Department PRIOR to your appointment with Dr. Brumfield.    IMPORTANT:  ALL images (not just the reports) must be sent and uploaded to the [employer] system. Dr. Brumfield reviews all images personally prior to each visit. Ensuring that ALL the test results and test images are accessible to Dr. Brumfield prior to your appointment is YOUR responsibility and an important part of making the most out of each appointment.   Bring a  [facility] picture ID and an updated insurance card EVERY visit.  It is highly recommended that you bring at every visit a list of the most important topics that you want address. While it may not be possible to address all items on the list in a single visit, preparing a list will ensure that Dr. Brumfield addresses the items that are most important to you and your health    PAPERWORK, DOCUMENTATION, LETTER REQUESTS:  You must notify the office ahead of your appointment of all paperwork or letter requests.   Please DO NOT wait until the last minute to make these requests. Please give all paperwork to the medical assistant at the start of the appointment and check-in process. Please note that Dr. Brumfield may not be able complete some types of documentation in a single appointment or even within a single day or week. This is why it is important to communicate paperwork requests prior to your appointment and at least 2 weeks prior to any deadlines.    KNOW ALL YOU MEDICATIONS:   AT EVERY SINGLE APPOINTMENT, please bring a list of every single prescribed, non-prescribed, and over the counter medication or supplements you are taking, including ones taken on a rare or intermittent basis.  Include the following information for each prescribed or non-prescribed medications:  Name of medication   The strength of EACH pill/capsule/tablet, etc.   The number of pills/capsules/tablets, etc taken per dose  The number and time of day that doses are taken  For every single Supplement that you take on a routine or intermittent basis, you must include:  The Brand Name   A complete list of every single ingredient, compound, vitamin, and/or mineral in each dose, along with the corresponding amounts/strengths of all ingredients, vitamins, minerals, etc., if such information is provided or known  The number of doses taken per day and time of day doses are taken  If medications are taken on an intermittent or as needed basis, please  "estimate how many days per week or days per month the medications are used  DO NOT just print out your medication list from [employer] or bring a list from a prior appointment or hospitalizations because the information is often often unreliable, inaccurate, outdated, and/or incomplete   The list should be printed or written  If you forget or do not have a list of all the medication, then it is acceptable, although less preferred, to bring all the bottles to the appointment     ARRIVE EARLY FOR ALL VISITS:  Please note that we are unable to accommodate late arrivals as per office policy.  YOU-the patient - (NOT a parent, spouse, or friend) must be physically present at check-in no later than 12 minutes after the scheduled appointment time, or you will be asked to reschedule   Consider scheduling a virtual appointment with Dr. Brumfield through [employer] as an alternative if transportation to the clinic is difficult or unavailable   Please note, however, that virtual visits can only be scheduled after being an established patient of Dr. Brumfield. All new appointments must be done in-person in clinic  Some insurances will not cover the cost of virtual appointments. Please check with your insurance to find out if these visits are covered    COMMUNICATING URGENT AND NON-URGENT MATTERS:  Your concerns are important and deserve to be heard and addressed. If you have an urgent matter, there are two methods that will ensure your concerns are prioritized appropriately:   Preferred method: Sign-up/Login to your [employer] account and send a message addressed to Dr. Brumfield or Nicole Malin (Dr. Brumfield's assistant). In the subject line, type \"urgent\" followed by a word or phrase describing the situation (For example, write \"Urgent: Out of antiseuzre med and need refill\" or \"Urgent: Severe side effects to new meds\". In doing this, our staff can ensure urgent messages are triaged appropriately and communicated to Dr. Brumfield that day.  Call " [facility] main line at [phone]. Dr. Brumfield's voicemail extension is [phone]. When leaving a voice message, specifically indicate if it is urgent (or non-urgent) so that the matter can be triaged appropriately and addressed in a timely manner    Thank you for taking important action in your care!
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
## 2023-03-04 ENCOUNTER — HOSPITAL ENCOUNTER (OUTPATIENT)
Dept: RADIOLOGY | Facility: MEDICAL CENTER | Age: 48
End: 2023-03-04
Attending: CLINICAL NURSE SPECIALIST
Payer: COMMERCIAL

## 2023-03-04 DIAGNOSIS — E03.9 HYPOTHYROIDISM, UNSPECIFIED TYPE: ICD-10-CM

## 2023-03-04 PROCEDURE — 76536 US EXAM OF HEAD AND NECK: CPT

## 2023-03-07 ENCOUNTER — OFFICE VISIT (OUTPATIENT)
Dept: MEDICAL GROUP | Facility: IMAGING CENTER | Age: 48
End: 2023-03-07
Payer: COMMERCIAL

## 2023-03-07 VITALS
DIASTOLIC BLOOD PRESSURE: 64 MMHG | HEART RATE: 70 BPM | BODY MASS INDEX: 26.46 KG/M2 | SYSTOLIC BLOOD PRESSURE: 100 MMHG | HEIGHT: 64 IN | OXYGEN SATURATION: 97 % | WEIGHT: 155 LBS | TEMPERATURE: 99 F

## 2023-03-07 DIAGNOSIS — E03.9 HYPOTHYROIDISM, UNSPECIFIED TYPE: ICD-10-CM

## 2023-03-07 DIAGNOSIS — E28.0 ESTROGEN EXCESS: ICD-10-CM

## 2023-03-07 PROCEDURE — 99214 OFFICE O/P EST MOD 30 MIN: CPT | Performed by: CLINICAL NURSE SPECIALIST

## 2023-03-07 NOTE — PROGRESS NOTES
"Subjective     Earnestine Lindsay is a 47 y.o. female who presents with Lab Results            HPI  Hypothyroidism  Made appt with endocrinology but unsure when.  Last lab shows low TSH. Taking Cytomel 5mcg and levothyroxine 75mcg.    Estrogen excess  Taking weekly testosterone injection at home 20mg.  Previously prescribed by PCP.    ROS  See HPI    Allergies   Allergen Reactions    Promethazine Hives     Current Outpatient Medications on File Prior to Visit   Medication Sig Dispense Refill    hydroCHLOROthiazide (HYDRODIURIL) 25 MG Tab TAKE 1 TABLET BY MOUTH TWICE A  Tablet 3    escitalopram (LEXAPRO) 10 MG Tab Take 1 Tablet by mouth every morning for 90 days. 90 Tablet 0    Eszopiclone (LUNESTA) 2 MG Tab Take 1 Tablet by mouth at bedtime as needed (for sleep) for up to 30 days. 30 Tablet 0    Cariprazine HCl (VRAYLAR) 4.5 MG Cap Take 4.5 mg by mouth every day for 90 days. 90 Capsule 0    progesterone (PROMETRIUM) 100 MG Cap TAKE 1 CAPSULE BY MOUTH @ BEDTIME      NEEDLE, DISP, 22 G (BD DISP NEEDLES) 22G X 1-1/2\" Misc 1 Each every 7 days. 10 Each 2    Galcanezumab-gnlm (EMGALITY) 120 MG/ML Solution Prefilled Syringe Inject 120 mg under the skin every 30 DAYS. 1 mL 5    lamotrigine (LAMICTAL) 200 MG tablet TAKE 1 TABLET BY MOUTH EVERY MORNING THEN TAKE 1 TABLET BY MOUTH IN THE EVENING      Potassium Citrate 15 MEQ (1620 MG) Tab CR TAKE 2 TABLETS BY MOUTH 2 TIMES A DAY. 360 Tablet 3    cholecalciferol (VITAMIN D3) 5000 UNIT Cap Take 1 Capsule by mouth every Monday, Wednesday, and Friday.      testosterone cypionate (DEPO-TESTOSTERONE) 200 MG/ML Solution injection INJECT 0.1 ML INTRAMUSCULARLY WEEKLY DX: N95.1      tamsulosin (FLOMAX) 0.4 MG capsule TAKE 1 CAPSULE BY MOUTH EVERY DAY. TAKE DAILY DURING EPISODES OF KIDNEY STONE PAIN. 90 Capsule 1    oxyCODONE-Acetaminophen (PERCOCET PO) Take  by mouth.      albuterol 108 (90 Base) MCG/ACT Aero Soln inhalation aerosol 90 Puffs.      cyclobenzaprine (FLEXERIL) " "10 mg Tab 1 Tablet.      dicyclomine (BENTYL) 20 MG Tab 1 Tablet.      levothyroxine (SYNTHROID) 75 MCG Tab 1 Tablet.      ondansetron (ZOFRAN ODT) 4 MG TABLET DISPERSIBLE 1 Tablet.      Spacer/Aero-Holding Chambers (AEROCHAMBER MAX W/FLOW-VU) Misc Aerochamber Plus Flow-Vu      HYDROmorphone (DILAUDID) 4 MG Tab hydromorphone 4 mg tablet      liothyronine (CYTOMEL) 5 MCG Tab 0.005 mcg.       No current facility-administered medications on file prior to visit.              Objective     /64 (BP Location: Right arm, Patient Position: Sitting, BP Cuff Size: Adult)   Pulse 70   Temp 37.2 °C (99 °F) (Temporal)   Ht 1.626 m (5' 4\")   Wt 70.3 kg (155 lb)   SpO2 97%   BMI 26.61 kg/m²      Physical Exam  Constitutional:       General: She is not in acute distress.     Appearance: Normal appearance. She is not ill-appearing, toxic-appearing or diaphoretic.   HENT:      Head: Normocephalic and atraumatic.   Eyes:      Extraocular Movements: Extraocular movements intact.      Pupils: Pupils are equal, round, and reactive to light.   Cardiovascular:      Rate and Rhythm: Normal rate.   Pulmonary:      Effort: Pulmonary effort is normal.   Skin:     General: Skin is warm and dry.   Neurological:      Mental Status: She is alert and oriented to person, place, and time.      Gait: Gait normal.   Psychiatric:         Mood and Affect: Mood normal.         Behavior: Behavior normal.         Thought Content: Thought content normal.         Judgment: Judgment normal.          Hospital Outpatient Visit on 02/14/2023   Component Date Value    Estradiol-E2 02/14/2023 11.0     Estrone Serum 02/14/2023 21.8     Total Estrogen 02/14/2023 32.8     Testosterone,Total 02/14/2023 556 (H)     Sex Hormone Bind Globulin 02/14/2023 24 (L)     Testosterone Fr LCMS 02/14/2023 124.5 (H)     T3 02/14/2023 100.0     Anti-Thyroglobulin 02/14/2023 <0.9     Free T-4 02/14/2023 1.14     TSH 02/14/2023 0.362 (L)     Magnesium 02/14/2023 2.0     " Cholesterol,Tot 02/14/2023 177     Triglycerides 02/14/2023 84     HDL 02/14/2023 47     LDL 02/14/2023 113 (H)     Hepatitis C Antibody 02/14/2023 Non-Reactive     Microsomal -Tpo- Abs 02/14/2023 <9.0     Fasting Status 02/14/2023 Fasting                           Assessment & Plan      1. Hypothyroidism, unspecified type  Chronic, uncontrolled. TSH slightly low last lab.  Will stop Cytomel and recheck in 6 weeks.     STOP Cytomel  CONTINUE levothyroxine 75mcg daily    - TSH WITH REFLEX TO FT4; Future    2. Estrogen excess  Chronic, controlled. Had hysterectomy.  Estrogen normal.  Testosterone elevated. Last MRI brain normal.  She has appointment with endocrinology but unsure when. Will stop testosterone and recheck labs.    STOP testosterone injections    - TESTOSTERONE F&T FEMALES/CHILD; Future  - ESTROGENS FRACTIONATED; Future     Return in about 6 weeks (around 4/18/2023), or if symptoms worsen or fail to improve, for With test results.

## 2023-03-07 NOTE — ASSESSMENT & PLAN NOTE
Made appt with endocrinology but unsure when.  Last lab shows low TSH. Taking Cytomel 5mcg and levothyroxine 75mcg.

## 2023-03-10 DIAGNOSIS — F51.01 PRIMARY INSOMNIA: ICD-10-CM

## 2023-03-10 RX ORDER — ESZOPICLONE 2 MG/1
2 TABLET, FILM COATED ORAL
Qty: 30 TABLET | Refills: 0 | Status: SHIPPED | OUTPATIENT
Start: 2023-03-10 | End: 2023-03-24

## 2023-03-24 ENCOUNTER — OFFICE VISIT (OUTPATIENT)
Dept: BEHAVIORAL HEALTH | Facility: CLINIC | Age: 48
End: 2023-03-24
Payer: COMMERCIAL

## 2023-03-24 DIAGNOSIS — F31.62 BIPOLAR DISORDER, CURRENT EPISODE MIXED, MODERATE (HCC): ICD-10-CM

## 2023-03-24 DIAGNOSIS — F51.01 PRIMARY INSOMNIA: ICD-10-CM

## 2023-03-24 PROCEDURE — 99214 OFFICE O/P EST MOD 30 MIN: CPT | Performed by: PSYCHIATRY & NEUROLOGY

## 2023-03-24 RX ORDER — ESCITALOPRAM OXALATE 10 MG/1
10 TABLET ORAL EVERY MORNING
Qty: 90 TABLET | Refills: 0 | Status: SHIPPED | OUTPATIENT
Start: 2023-03-24 | End: 2023-05-05 | Stop reason: SDUPTHER

## 2023-03-24 RX ORDER — CARIPRAZINE 3 MG/1
3 CAPSULE, GELATIN COATED ORAL NIGHTLY
Qty: 30 CAPSULE | Refills: 3 | Status: SHIPPED | OUTPATIENT
Start: 2023-03-24 | End: 2023-05-05 | Stop reason: SDUPTHER

## 2023-03-24 RX ORDER — ESZOPICLONE 2 MG/1
2 TABLET, FILM COATED ORAL
Qty: 30 TABLET | Refills: 0 | Status: SHIPPED | OUTPATIENT
Start: 2023-03-24 | End: 2023-04-23

## 2023-03-24 ASSESSMENT — ENCOUNTER SYMPTOMS
THOUGHT CONTENT - SHAME: 0
RESPIRATORY NEGATIVE: 1
EUPHORIC MOOD: 0
DECREASED NEED FOR SLEEP: 0
AWAKENING FROM SLEEP: 1
HOPELESSNESS: 0
DIFFICULTY FALLING ASLEEP: 0
THOUGHT CONTENT - EXCESSIVE UNREASONABLE FEAR: 0
HYPERVIGILANCE: 0
GASTROINTESTINAL NEGATIVE: 1
HYPERSEXUAL: 0
CONSTITUTIONAL NEGATIVE: 1
EYES NEGATIVE: 1
PSYCHIATRIC NEGATIVE: 1
FREQUENT AWAKENING: 1
DECREASED ENERGY: 1
AGORAPHOBIA: 0
CARDIOVASCULAR NEGATIVE: 1
PANIC: 0
NEUROLOGICAL NEGATIVE: 1
HYPERSOMNIA: 0
MUSCULOSKELETAL NEGATIVE: 1
PREOCCUPATION: 0
PARANOIA: 0
DELUSIONS: 0
AGGRESSION: 0
DECREASED APPETITE: 0
COMPULSIONS: 0

## 2023-03-24 ASSESSMENT — SOCIAL DETERMINANTS OF HEALTH (SDOH): ANXIETY ASSOCIATED WITH SOCIAL SUPPORT SYSTEM: 0

## 2023-03-24 NOTE — PROGRESS NOTES
PSYCHIATRY FOLLOW-UP NOTE      Name: Earnestine Lindsay  MRN: [id number]  : 1975  Age: 47 y.o.  PCP: CARRIE Lilly  Persons in attendance: Patient      REASON FOR VISIT/CHIEF COMPLAINT   Medication follow-up          HISTORY OF PRESENT ILLNESS  Earnestine Lindsay is a 47 y.o. old female comes in today to follow-up on care with bipolar type II.    Patient reports that she has had difficulty with sleep initiation since the reduction of Lunesta.  However she has been exercising almost daily in the late afternoons.  Still utilizing cannabis Gummies nightly to help with mood and anxiety as well as sleep.  Denies worsening of tremors or abnormal movements.  Does state that she feels shaky in the mornings but goes away throughout the day    PSYCHIATRIC REVIEW OF SYSTEMS:      MOOD SYMPTOMS:   Pertinent negatives - not sad, no euphoric mood, no mood swings, not irritable, no mood changes, no grandiosity and not apathetic      ANXIETY:   Pertinent negatives - no excessive worry, no excessive unreasonable fear, no anxiety, no panic, no agoraphobia, no hypervigilance and depression/anxiety not affecting progress    SLEEP:   Pertinent positives - awakening from sleep    Pertinent negatives - no difficulty falling asleep, no decreased need for sleep and no hypersomnia     PSYCHOMOTOR/ENERGY:   Pertinent positives - decreased energy    EATING:   Pertinent negatives: no decreased appetite    COGNITIVE:   Pertinent negatives: concentration not impaired, no obsessions, no compulsions, no preoccupation, no hopelessness and no shame     BEHAVIOR:   Pertinent negatives - patient does not experience agitation or aggression and not hypersexual    PSYCHOSIS:   Pertinent negatives - auditory hallucinations, visual hallucinations, delusions, ideas of reference and paranoia  SOCIAL:   Pertinent negatives - no family conflict, no conflicts, no social withdrawal, does not report a sense of detachment from others, no lack  "of social reciprocity and social skills and no history of withdrawal symptoms    SENSORY:             MEDICAL REVIEW OF SYSTEMS:   Review of Systems   Constitutional: Negative.  Negative for decreased appetite.   HENT: Negative.     Eyes: Negative.    Respiratory: Negative.     Cardiovascular: Negative.    Gastrointestinal: Negative.    Genitourinary: Negative.    Musculoskeletal: Negative.    Skin: Negative.    Neurological: Negative.    Endo/Heme/Allergies: Negative.    Psychiatric/Behavioral: Negative.  Negative for hypervigilance and paranoia.        CURRENT MEDICATIONS:    Current Outpatient Medications:     escitalopram, 10 mg, Oral, QAM    Eszopiclone, 2 mg, Oral, QHS PRN    Vraylar, 3 mg, Oral, Nightly    hydroCHLOROthiazide, TAKE 1 TABLET BY MOUTH TWICE A DAY    progesterone, TAKE 1 CAPSULE BY MOUTH @ BEDTIME    BD Disp Needles, 1 Each, Does not apply, Q7 DAYS    Emgality, 120 mg, Subcutaneous, Q30 DAYS    lamotrigine, TAKE 1 TABLET BY MOUTH EVERY MORNING THEN TAKE 1 TABLET BY MOUTH IN THE EVENING    Potassium Citrate, 2 Tablet, Oral, BID    cholecalciferol, 5,000 Units, Oral, MO, WE + FR    testosterone cypionate, INJECT 0.1 ML INTRAMUSCULARLY WEEKLY DX: N95.1    tamsulosin, 0.4 mg, Oral, DAILY    oxyCODONE-Acetaminophen (PERCOCET PO), Take  by mouth.    albuterol, 90 Puffs.    cyclobenzaprine, 1 Tablet.    dicyclomine, 1 Tablet.    levothyroxine, 1 Tablet.    liothyronine, 0.005 mcg.    ondansetron, 1 Tablet.    AeroChamber MAX w/Flow-VU, Aerochamber Plus Flow-Vu    HYDROmorphone, hydromorphone 4 mg tablet       PAST PSYCHIATRIC MEDICATIONS  Seroquel - sedating  Latuda - side effects, \"felt off\"   Depakote - changes in vision  Wellbutrin - went off due to seizures  Lithium- developed diabetes and ckd    PHYSICAL EXAMINAION:  Vital signs: There were no vitals taken for this visit.  Musculoskeletal: Normal gait.   Abnormal movements:        MENTAL STATUS EXAMINATION      General:   - Grooming and hygiene: " Neat,   - Apparent distress: NAD,   - Behavior: Calm  - Eye Contact:  Good,   - no psychomotor agitation or retardation    - Participation: Limited verbal participation and Guarded  Orientation: Alert and Fully Oriented to person, place and time  Mood: Euthymic  Affect: Blunted and Incongruent with content,  Thought Process: Logical  Thought Content: Denies suicidal or homicidal ideations, intent or plan Within normal limits  Perception: Denies auditory or visual hallucinations. No delusions noted Within normal limits  Attention span and concentration: Intact   Speech:Rate within normal limits and Volume within normal limits  Language: Appropriate   Insight: Good  Judgment: Good  Recent and remote memory: No gross evidence of memory deficits      DEPRESSION SCREENIN/5/2022     7:20 AM 3/2/2022     3:00 PM 2/10/2023     8:00 AM   Depression Screen (PHQ-2/PHQ-9)   PHQ-2 Total Score 3 2 1   PHQ-9 Total Score 9 10 2                       CURRENT RISK:   Suicidal: Low  Homicidal: Low  Self-Harm: Low  Relapse: Low  Crisis Safety Plan Reviewed Not Indicated     MEDICAL RECORDS/LABS/DIAGNOSTIC TESTS REVIEWED:    Lab Results   Component Value Date/Time    WBC 6.4 2022 04:41 PM    RBC 4.63 2022 04:41 PM    HEMOGLOBIN 15.2 2022 04:41 PM    HEMATOCRIT 44.6 2022 04:41 PM    MCV 96.3 2022 04:41 PM    MCH 32.8 2022 04:41 PM    MCHC 34.1 2022 04:41 PM    MPV 9.6 2022 04:41 PM    NEUTSPOLYS 65.10 2020 03:47 PM    LYMPHOCYTES 27.30 2020 03:47 PM    MONOCYTES 5.10 2020 03:47 PM    EOSINOPHILS 1.40 2020 03:47 PM    BASOPHILS 0.80 2020 03:47 PM       Lab Results   Component Value Date/Time    SODIUM 139 2022 04:41 PM    POTASSIUM 3.9 2022 04:41 PM    CHLORIDE 100 2022 04:41 PM    CO2 28 2022 04:41 PM    GLUCOSE 76 2022 04:41 PM    BUN 15 2022 04:41 PM    CREATININE 0.95 2022 04:41 PM       Lab Results    Component Value Date/Time    CHOLSTRLTOT 177 02/14/2023 07:23 AM     (H) 02/14/2023 07:23 AM    HDL 47 02/14/2023 07:23 AM    TRIGLYCERIDE 84 02/14/2023 07:23 AM       Lab Results   Component Value Date/Time    HBA1C 5.3 11/01/2022 04:41 PM       Lab Results   Component Value Date/Time    CHOLSTRLTOT 177 02/14/2023 07:23 AM     (H) 02/14/2023 07:23 AM    HDL 47 02/14/2023 07:23 AM    TRIGLYCERIDE 84 02/14/2023 07:23 AM       Lab Results   Component Value Date/Time    ALKPHOSPHAT 102 (H) 09/04/2020 07:55 AM    ASTSGOT 18 09/04/2020 07:55 AM    ALTSGPT 22 09/04/2020 07:55 AM    TBILIRUBIN 0.3 09/04/2020 07:55 AM         Lab Results   Component Value Date/Time    LITHIUM 0.6 12/13/2021 07:50 AM           [place]  records -   Checked, no acute concerns          ASSESSMENT  Patient's bipolar symptoms stable.  Concern for adverse side effects from Vraylar and morning akathisia.  Also concern for continued cannabis use with comorbid bipolar.  Discussed with patient concerns.  Some improvement noted in affect restriction.  Pharmacological intervention will continue to be limited with comorbid cannabis use.  Patient made aware    DIAGNOSES  1. Bipolar disorder, current episode mixed, moderate (HCC)  escitalopram (LEXAPRO) 10 MG Tab      2. Primary insomnia  Eszopiclone 2 MG Tab              PLAN:        Education  Discussed risk of cannabis use.  Medications:   DECREASE Vraylar to 3 mg at bedtime   REFILL Lexapro 10 mg every morning for depression.  Currently on mood stabilizer  CONTINUE Lunesta 2 mg at bedtime.  Plan to decrease at next visit  Psychotherapy: Recommending restarting psychotherapy.  Send list of resources  Labs/studies: None    Other:              Medication options, alternatives (including no medications) and medication risks/benefits/side effects were discussed in detail.  Explained importance of contraceptive measures while on psychotropic medications, educated to let provider know if ever  pregnant or wanting to become pregnant. Verbalized understanding.  The patient was advised to call, message provider on [employer], or come in to the clinic if symptoms worsen or if any future questions/issues regarding their medications arise; the patient verbalized understanding and agreement.    The patient was educated to call 911, call the suicide hotline, or go to local ER if having thoughts of suicide or homicide; verbalized understanding.        Return to clinic in 6 weeks or sooner if symptoms worsen.  If patient stable at this time, may reschedule for longer duration  Next Appointment:  instruction provided on how to make the next appointment.     The proposed treatment plan was discussed with the patient who was provided the opportunity to ask questions and make suggestions regarding alternative treatment. Patient verbalized understanding and expressed agreement with the plan.         Rajendra Altman D.O.      CC:   Edmond Thomas D.O.    This note was created using voice recognition software (Dragon). The accuracy of the dictation is limited by the abilities of the software. I have reviewed the note prior to signing, however some errors in grammar and context are still possible. If you have any questions related to this note please do not hesitate to contact our office.

## 2023-03-31 ENCOUNTER — PATIENT MESSAGE (OUTPATIENT)
Dept: HEALTH INFORMATION MANAGEMENT | Facility: OTHER | Age: 48
End: 2023-03-31

## 2023-03-31 PROCEDURE — RXMED WILLOW AMBULATORY MEDICATION CHARGE: Performed by: PSYCHIATRY & NEUROLOGY

## 2023-04-03 ENCOUNTER — DOCUMENTATION (OUTPATIENT)
Dept: PHARMACY | Facility: MEDICAL CENTER | Age: 48
End: 2023-04-03
Payer: COMMERCIAL

## 2023-04-03 NOTE — PROGRESS NOTES
03/31/23: Spoke with [MARIO]. She is doing well on the [Emgality 120mg/ml]. She reports no side effects to the medication and no new allergies. She reports 0 missed doses and has about [0] on hand, due to inj 04/08. Sending delivery for [04/05] via . No supplies needed no questions for [facility]

## 2023-04-05 ENCOUNTER — PHARMACY VISIT (OUTPATIENT)
Dept: PHARMACY | Facility: MEDICAL CENTER | Age: 48
End: 2023-04-05
Payer: COMMERCIAL

## 2023-04-13 ENCOUNTER — DOCUMENTATION (OUTPATIENT)
Dept: PHARMACY | Facility: MEDICAL CENTER | Age: 48
End: 2023-04-13
Payer: COMMERCIAL

## 2023-04-15 NOTE — PROGRESS NOTES
PHARMACIST FOLLOW UP - Follow Up Assessment   Dx: Migraine [G43.909]    Tx prescribed: Emgality 120mg/mL auto-injector SC Q 30 days   - Administration:  1 pen SC into rotating tops of thighs.     Adherence: last injected on 4/8, strong routine of injecting every 8th of the month. No missed doses   - Missed dose mgmt: n/a      Current SE: none   - Mitigation/Mgmt: n/a     List Changes to Allergies, Diagnoses:  none      Current S/Sx: none   How many migraine days in the past month? 2   Pain severity: rarely severe, typically a 7/10 severity at most   Avg duration of migraine in past month: couple hours     Clinically Relevant, Abnormal Labs:  11/1/22    1. CBC: wnl   2. CHEM 7: wnl    a. CRCL/GFR: wnl    b. LFTs/TBili: wnl   3. BP/HR: (3/7/23) wnl   Wellness/Lifestyle Counseling:    - Support/QOL: doing very well    - Immunizations: deferred**     - Triggers: no new    - Diet/hydration: no recent changes   - Exercise/stress mgmt: routine exercise - wt training  Med Rec/Updated drug list: EMR accurate, no medication list inaccuracies. Reviewed with patient/caregiver.   DI Check:      - Cat X cyclobenzaprine/dicyclomine + potassium citrate = can lead to GI ulceration d/t reduced transit time of potassium tab. Cyclobenzaprine and dicyclomine prescribed for PRN use; continue to monitor.    - Cat D eszopiclone + cyclobenzaprine + hydromorphone + lamictal + vraylar = additive/increased CNS depression, patient monitored closely by neuro and psychiatry, has been on combination > 3 months continue to monitor.  Goals of Therapy:      - To reduce migraine frequency, duration, and severity    ? Outcomes noted above, improvement in frequency, duration, and severity   - To improve acute medication responsiveness and reduce need for    - acute attacks    ? Improved, patient shared episodic meds effective and there are times her migraines resolve on their own after lying down.   - To identify and modify migraine triggers    ? No  recent changes  Progression toward goals: currently meeting all goals  Patient has agreed/understands to goals of therapy during education/counseling       Additional: Had a brief talk with Earnestine who continues to note migraine control on Emgality. No new health concerns and doing well. Thankful for the continued check-ins and agreeable to future calls from clinical [facility] team. Patient stable, continue to follow per clinical workflow map.

## 2023-04-28 PROCEDURE — RXMED WILLOW AMBULATORY MEDICATION CHARGE: Performed by: PSYCHIATRY & NEUROLOGY

## 2023-05-01 ENCOUNTER — DOCUMENTATION (OUTPATIENT)
Dept: PHARMACY | Facility: MEDICAL CENTER | Age: 48
End: 2023-05-01
Payer: COMMERCIAL

## 2023-05-01 NOTE — PROGRESS NOTES
04/28/23: Spoke with [MARIO]. She is doing well on the [Emgality 120mg/ml]. She reports no side effects to the medication and no new allergies. She reports 0 missed doses and has about [0] on hand, due to inj 05/08. Sending delivery for [05/04] via . Needs supplies needed no questions for [facility]

## 2023-05-04 ENCOUNTER — PHARMACY VISIT (OUTPATIENT)
Dept: PHARMACY | Facility: MEDICAL CENTER | Age: 48
End: 2023-05-04
Payer: COMMERCIAL

## 2023-05-05 ENCOUNTER — OFFICE VISIT (OUTPATIENT)
Dept: BEHAVIORAL HEALTH | Facility: CLINIC | Age: 48
End: 2023-05-05
Payer: COMMERCIAL

## 2023-05-05 DIAGNOSIS — F51.01 PRIMARY INSOMNIA: ICD-10-CM

## 2023-05-05 DIAGNOSIS — F12.90 CANNABIS USE DISORDER: ICD-10-CM

## 2023-05-05 DIAGNOSIS — F31.62 BIPOLAR DISORDER, CURRENT EPISODE MIXED, MODERATE (HCC): ICD-10-CM

## 2023-05-05 PROCEDURE — 99214 OFFICE O/P EST MOD 30 MIN: CPT | Performed by: PSYCHIATRY & NEUROLOGY

## 2023-05-05 RX ORDER — CARIPRAZINE 3 MG/1
3 CAPSULE, GELATIN COATED ORAL NIGHTLY
Qty: 30 CAPSULE | Refills: 3 | Status: SHIPPED | OUTPATIENT
Start: 2023-05-05 | End: 2023-08-03

## 2023-05-05 RX ORDER — ESCITALOPRAM OXALATE 10 MG/1
10 TABLET ORAL EVERY MORNING
Qty: 90 TABLET | Refills: 0 | Status: SHIPPED | OUTPATIENT
Start: 2023-05-05 | End: 2023-08-11

## 2023-05-05 RX ORDER — ESZOPICLONE 1 MG/1
1 TABLET, FILM COATED ORAL
Qty: 30 TABLET | Refills: 0 | Status: SHIPPED | OUTPATIENT
Start: 2023-05-05 | End: 2023-06-04

## 2023-05-05 ASSESSMENT — ENCOUNTER SYMPTOMS
DECREASED NEED FOR SLEEP: 0
PANIC: 0
AGORAPHOBIA: 0
HYPERSEXUAL: 0
DELUSIONS: 0
RESPIRATORY NEGATIVE: 1
EUPHORIC MOOD: 0
THOUGHT CONTENT - SHAME: 0
MUSCULOSKELETAL NEGATIVE: 1
HOPELESSNESS: 0
GASTROINTESTINAL NEGATIVE: 1
DIFFICULTY FALLING ASLEEP: 0
THOUGHT CONTENT - EXCESSIVE UNREASONABLE FEAR: 0
DECREASED APPETITE: 0
EYES NEGATIVE: 1
CONSTITUTIONAL NEGATIVE: 1
AWAKENING FROM SLEEP: 1
DECREASED ENERGY: 1
NEUROLOGICAL NEGATIVE: 1
FREQUENT AWAKENING: 1
CARDIOVASCULAR NEGATIVE: 1
COMPULSIONS: 0
PREOCCUPATION: 0
HYPERVIGILANCE: 0
PARANOIA: 0
HYPERSOMNIA: 0
AGGRESSION: 0
PSYCHIATRIC NEGATIVE: 1

## 2023-05-05 ASSESSMENT — SOCIAL DETERMINANTS OF HEALTH (SDOH): ANXIETY ASSOCIATED WITH SOCIAL SUPPORT SYSTEM: 0

## 2023-05-05 NOTE — PROGRESS NOTES
PSYCHIATRY FOLLOW-UP NOTE      Name: Earnestine Lindsay  MRN: [id number]  : 1975  Age: 47 y.o.  PCP: CARRIE Lilly  Persons in attendance: Patient      REASON FOR VISIT/CHIEF COMPLAINT   Medication follow-up          HISTORY OF PRESENT ILLNESS  Earnestine Lindsay is a 47 y.o. old female comes in today to follow-up on care with bipolar type II.      Patient reports she continues to use cannabis nightly.  Struggling more with sleep with the reduction of Lunesta.  Has not engaged in CBT-I with recommended resources.  Denies significant changes in mood or worsening of symptoms of hypomania or tran.  Reports that she does not desire to discontinue cannabis as it helps her with anxiety        PSYCHIATRIC REVIEW OF SYSTEMS:      MOOD SYMPTOMS:   Pertinent negatives - not sad, no euphoric mood, no mood swings, not irritable, no mood changes, no grandiosity and not apathetic      ANXIETY:   Pertinent negatives - no excessive worry, no excessive unreasonable fear, no anxiety, no panic, no agoraphobia, no hypervigilance and depression/anxiety not affecting progress    SLEEP:   Pertinent positives - awakening from sleep    Pertinent negatives - no difficulty falling asleep, no decreased need for sleep and no hypersomnia     PSYCHOMOTOR/ENERGY:   Pertinent positives - decreased energy    EATING:   Pertinent negatives: no decreased appetite    COGNITIVE:   Pertinent negatives: concentration not impaired, no obsessions, no compulsions, no preoccupation, no hopelessness and no shame     BEHAVIOR:   Pertinent negatives - patient does not experience agitation or aggression and not hypersexual    PSYCHOSIS:   Pertinent negatives - auditory hallucinations, visual hallucinations, delusions, ideas of reference and paranoia  SOCIAL:   Pertinent negatives - no family conflict, no conflicts, no social withdrawal, does not report a sense of detachment from others, no lack of social reciprocity and social skills and no  "history of withdrawal symptoms    SENSORY:             MEDICAL REVIEW OF SYSTEMS:   Review of Systems   Constitutional: Negative.  Negative for decreased appetite.   HENT: Negative.     Eyes: Negative.    Respiratory: Negative.     Cardiovascular: Negative.    Gastrointestinal: Negative.    Genitourinary: Negative.    Musculoskeletal: Negative.    Skin: Negative.    Neurological: Negative.    Endo/Heme/Allergies: Negative.    Psychiatric/Behavioral: Negative.  Negative for hypervigilance and paranoia.        CURRENT MEDICATIONS:    Current Outpatient Medications:     escitalopram, 10 mg, Oral, QAM    Vraylar, 3 mg, Oral, Nightly    hydroCHLOROthiazide, TAKE 1 TABLET BY MOUTH TWICE A DAY    progesterone, TAKE 1 CAPSULE BY MOUTH @ BEDTIME    BD Disp Needles, 1 Each, Does not apply, Q7 DAYS    Emgality, 120 mg, Subcutaneous, Q30 DAYS    lamotrigine, TAKE 1 TABLET BY MOUTH EVERY MORNING THEN TAKE 1 TABLET BY MOUTH IN THE EVENING    Potassium Citrate, 2 Tablet, Oral, BID    cholecalciferol, 5,000 Units, Oral, MO, WE + FR    testosterone cypionate, INJECT 0.1 ML INTRAMUSCULARLY WEEKLY DX: N95.1    tamsulosin, 0.4 mg, Oral, DAILY    oxyCODONE-Acetaminophen (PERCOCET PO), Take  by mouth.    albuterol, 90 Puffs.    cyclobenzaprine, 1 Tablet.    dicyclomine, 1 Tablet.    levothyroxine, 1 Tablet.    liothyronine, 0.005 mcg.    ondansetron, 1 Tablet.    AeroChamber MAX w/Flow-VU, Aerochamber Plus Flow-Vu    HYDROmorphone, hydromorphone 4 mg tablet       PAST PSYCHIATRIC MEDICATIONS  Seroquel - sedating  Latuda - side effects, \"felt off\"   Depakote - changes in vision  Wellbutrin - went off due to seizures  Lithium- developed diabetes and ckd    PHYSICAL EXAMINAION:  Vital signs: There were no vitals taken for this visit.  Musculoskeletal: Normal gait.   Abnormal movements:        MENTAL STATUS EXAMINATION      General:   - Grooming and hygiene: Neat,   - Apparent distress: NAD,   - Behavior: Calm  - Eye Contact:  Good,   - no " psychomotor agitation or retardation    - Participation: Limited verbal participation and Guarded  Orientation: Alert and Fully Oriented to person, place and time  Mood: Euthymic  Affect: Blunted and Incongruent with content,  Thought Process: Logical  Thought Content: Denies suicidal or homicidal ideations, intent or plan Within normal limits  Perception: Denies auditory or visual hallucinations. No delusions noted Within normal limits  Attention span and concentration: Intact   Speech:Rate within normal limits and Volume within normal limits  Language: Appropriate   Insight: Good  Judgment: Good  Recent and remote memory: No gross evidence of memory deficits      DEPRESSION SCREENIN/5/2022     7:20 AM 3/2/2022     3:00 PM 2/10/2023     8:00 AM   Depression Screen (PHQ-2/PHQ-9)   PHQ-2 Total Score 3 2 1   PHQ-9 Total Score 9 10 2                       CURRENT RISK:   Suicidal: Low  Homicidal: Low  Self-Harm: Low  Relapse: Low  Crisis Safety Plan Reviewed Not Indicated     MEDICAL RECORDS/LABS/DIAGNOSTIC TESTS REVIEWED:    Lab Results   Component Value Date/Time    WBC 6.4 2022 04:41 PM    RBC 4.63 2022 04:41 PM    HEMOGLOBIN 15.2 2022 04:41 PM    HEMATOCRIT 44.6 2022 04:41 PM    MCV 96.3 2022 04:41 PM    MCH 32.8 2022 04:41 PM    MCHC 34.1 2022 04:41 PM    MPV 9.6 2022 04:41 PM    NEUTSPOLYS 65.10 2020 03:47 PM    LYMPHOCYTES 27.30 2020 03:47 PM    MONOCYTES 5.10 2020 03:47 PM    EOSINOPHILS 1.40 2020 03:47 PM    BASOPHILS 0.80 2020 03:47 PM       Lab Results   Component Value Date/Time    SODIUM 139 2022 04:41 PM    POTASSIUM 3.9 2022 04:41 PM    CHLORIDE 100 2022 04:41 PM    CO2 28 2022 04:41 PM    GLUCOSE 76 2022 04:41 PM    BUN 15 2022 04:41 PM    CREATININE 0.95 2022 04:41 PM       Lab Results   Component Value Date/Time    CHOLSTRLTOT 177 2023 07:23 AM     (H)  02/14/2023 07:23 AM    HDL 47 02/14/2023 07:23 AM    TRIGLYCERIDE 84 02/14/2023 07:23 AM       Lab Results   Component Value Date/Time    HBA1C 5.3 11/01/2022 04:41 PM       Lab Results   Component Value Date/Time    CHOLSTRLTOT 177 02/14/2023 07:23 AM     (H) 02/14/2023 07:23 AM    HDL 47 02/14/2023 07:23 AM    TRIGLYCERIDE 84 02/14/2023 07:23 AM       Lab Results   Component Value Date/Time    ALKPHOSPHAT 102 (H) 09/04/2020 07:55 AM    ASTSGOT 18 09/04/2020 07:55 AM    ALTSGPT 22 09/04/2020 07:55 AM    TBILIRUBIN 0.3 09/04/2020 07:55 AM         Lab Results   Component Value Date/Time    LITHIUM 0.6 12/13/2021 07:50 AM           [place]  records -   Checked, no acute concerns          ASSESSMENT  Patient mood symptoms stable.  Patient restrictive blunting significantly improved since last visit, which is concerning that Vraylar may have been blunting overall affective range .  however, insomnia continues to be a concern.  Patient continues to use cannabis against medical direction and is in precontemplative stage of recovery.  Strong concern that cannabis may induce or precipitate worsening of depression or hypomania.  Patient is utilizing cannabis to treat anxiety.  However, concerned that the usage has become habitual and increasing overall baseline anxiety.  Recommending against long-term hypnotic usage given comorbid substance use.  However immediate discontinuation of hypnotics may worsen sleep and precipitate potential bipolar symptoms thus recommending gradual taper.      Patient continues to demonstrate low motivational drive with behavioral activation and engagement with care.  Concern this may be secondary to cannabis usage.        DIAGNOSES  1. Bipolar disorder, current episode mixed, moderate (HCC)        2. Cannabis use disorder        3. Primary insomnia                PLAN:        Education  Discussed risk of cannabis use.  Discussed importance of CBT-I, online resources provided  Medications:    REFILL Vraylar to 3 mg at bedtime   REFILL Lexapro 10 mg every morning for depression.  Currently on mood stabilizer  DECREASE Lunesta 1 mg at bedtime.  Plan to decrease at next visit  Psychotherapy: Recommending restarting psychotherapy.  Send list of resources  Labs/studies: None    Other:  continue motivational interviewing to help patient with discontinuation of cannabis and establishing care with psychotherapy            Medication options, alternatives (including no medications) and medication risks/benefits/side effects were discussed in detail.  Explained importance of contraceptive measures while on psychotropic medications, educated to let provider know if ever pregnant or wanting to become pregnant. Verbalized understanding.  The patient was advised to call, message provider on redealizet, or come in to the clinic if symptoms worsen or if any future questions/issues regarding their medications arise; the patient verbalized understanding and agreement.    The patient was educated to call 911, call the suicide hotline, or go to local ER if having thoughts of suicide or homicide; verbalized understanding.        Return to clinic in 6 weeks or sooner if symptoms worsen.  If patient stable at this time, may reschedule for longer duration  Next Appointment:  instruction provided on how to make the next appointment.     The proposed treatment plan was discussed with the patient who was provided the opportunity to ask questions and make suggestions regarding alternative treatment. Patient verbalized understanding and expressed agreement with the plan.         Rajendra Altman D.O.      CC:   Edmond Thomas D.O.    This note was created using voice recognition software (Dragon). The accuracy of the dictation is limited by the abilities of the software. I have reviewed the note prior to signing, however some errors in grammar and context are still possible. If you have any questions related to this note  please do not hesitate to contact our office.

## 2023-05-08 ENCOUNTER — OFFICE VISIT (OUTPATIENT)
Dept: MEDICAL GROUP | Facility: IMAGING CENTER | Age: 48
End: 2023-05-08
Payer: COMMERCIAL

## 2023-05-08 VITALS
DIASTOLIC BLOOD PRESSURE: 66 MMHG | WEIGHT: 156.4 LBS | TEMPERATURE: 97.5 F | BODY MASS INDEX: 26.7 KG/M2 | OXYGEN SATURATION: 95 % | HEART RATE: 74 BPM | HEIGHT: 64 IN | RESPIRATION RATE: 16 BRPM | SYSTOLIC BLOOD PRESSURE: 104 MMHG

## 2023-05-08 DIAGNOSIS — G43.109 MIGRAINE WITH AURA AND WITHOUT STATUS MIGRAINOSUS, NOT INTRACTABLE: ICD-10-CM

## 2023-05-08 DIAGNOSIS — F31.9 BIPOLAR AFFECTIVE DISORDER, REMISSION STATUS UNSPECIFIED (HCC): Chronic | ICD-10-CM

## 2023-05-08 DIAGNOSIS — N18.2 CKD (CHRONIC KIDNEY DISEASE) STAGE 2, GFR 60-89 ML/MIN: ICD-10-CM

## 2023-05-08 DIAGNOSIS — G40.909 SEIZURE DISORDER (HCC): ICD-10-CM

## 2023-05-08 DIAGNOSIS — Z76.89 ENCOUNTER TO ESTABLISH CARE: ICD-10-CM

## 2023-05-08 DIAGNOSIS — F41.1 GENERALIZED ANXIETY DISORDER: ICD-10-CM

## 2023-05-08 DIAGNOSIS — E28.0 ESTROGEN EXCESS: ICD-10-CM

## 2023-05-08 DIAGNOSIS — L98.9 SKIN LESION OF BACK: ICD-10-CM

## 2023-05-08 DIAGNOSIS — E03.9 HYPOTHYROIDISM, UNSPECIFIED TYPE: ICD-10-CM

## 2023-05-08 PROCEDURE — 99214 OFFICE O/P EST MOD 30 MIN: CPT

## 2023-05-08 NOTE — PROGRESS NOTES
Chief Complaint   Patient presents with    Establish Care     Parveen leaving     Other     Skin lesion on the back and itchy-     HISTORY OF THE PRESENT ILLNESS: Patient is a 47 y.o. female. This pleasant patient is here today to establish care and to discuss    To establish care  Previous [facility]veen Musht[facility]  Other providers:   Dr. Altman-psychiatry  Dr. Brumfield-neurology  Dr. Schwab-nephrology  Dr. Ocasio-endocrinology    Hypothyroidism  Chronic, was on Cytomel. Managed by endocrinology, seen last month. She is scheduled to follow up first week of June.   On levothyroxine 75 mcg per Dr. Ocasio. She is scheduled to repeat labs in 6-8 weeks.   Denies fast heart rate, palpitations, unintentional weight loss, increased hunger, nervousness, anxiety, irritability, tremors, excessive sweating, bowel changes, muscle weakness, or sleep disturbance disturbance.     Latest Reference Range & Units 02/14/23 07:23   TSH 0.380 - 5.330 uIU/mL 0.362 (L)   (L): Data is abnormally low    Estrogen excess  Established condition.  Patient on HRT treatment.  Managed by endocrinology.      Diabetes insipidus, nephrogenic  CKD   Chronic condition. Managed by Nephrologist, last seen in November.   She is taking potassium citrate and HCTZ.  Is avoiding nephrotoxic medication such as NSAIDs.  No decrease in urine output.    Bipolar  NOAH  Chronic condition. On Cariprazine and Lexapro.  Liberating medications well without side effects.  Managed by psychiatry.  Denies intent to harm self of others. NO recent mental health hospitalizations.  Patient tolerating medications well without side effects.  Patient denies symptoms of aggression, impulsivity, irritability, hyperactivity, difficulty with concentration.    Migraine  Chronic condition. On Emgality.  Tolerating medications well without side effects.  Managed by neurology. No recent flare ups    Seizures  Chronic condition. On lamictal.  Tolerating well without side effects.  Managed by neurology. Last  seizure was >7 months ago.     Skin lesion lower right back  Chronic condition for years. However, skin lesion started to have severe itchiness starting one month ago.  She is unable to determine whether size has grown as she cannot see it.  Denies closure to new products or chemicals.  Denies bug bites.  Denies fevers, chills, lethargy.    Allergies: Promethazine    Current Outpatient Medications Ordered in Epic   Medication Sig Dispense Refill    escitalopram (LEXAPRO) 10 MG Tab Take 1 Tablet by mouth every morning for 90 days. 90 Tablet 0    Cariprazine HCl (VRAYLAR) 3 MG Cap Take 3 mg by mouth every evening for 90 days. 30 Capsule 3    eszopiclone (LUNESTA) 1 MG Tab tablet Take 1 Tablet by mouth at bedtime as needed for Insomnia for up to 30 days. 30 Tablet 0    hydroCHLOROthiazide (HYDRODIURIL) 25 MG Tab TAKE 1 TABLET BY MOUTH TWICE A  Tablet 3    progesterone (PROMETRIUM) 100 MG Cap TAKE 1 CAPSULE BY MOUTH @ BEDTIME      Galcanezumab-gnlm (EMGALITY) 120 MG/ML Solution Prefilled Syringe Inject 120 mg under the skin every 30 DAYS. 1 mL 5    lamotrigine (LAMICTAL) 200 MG tablet TAKE 1 TABLET BY MOUTH EVERY MORNING THEN TAKE 1 TABLET BY MOUTH IN THE EVENING      Potassium Citrate 15 MEQ (1620 MG) Tab CR TAKE 2 TABLETS BY MOUTH 2 TIMES A DAY. 360 Tablet 3    cholecalciferol (VITAMIN D3) 5000 UNIT Cap Take 1 Capsule by mouth every Monday, Wednesday, and Friday.      testosterone cypionate (DEPO-TESTOSTERONE) 200 MG/ML Solution injection INJECT 0.1 ML INTRAMUSCULARLY WEEKLY DX: N95.1      tamsulosin (FLOMAX) 0.4 MG capsule TAKE 1 CAPSULE BY MOUTH EVERY DAY. TAKE DAILY DURING EPISODES OF KIDNEY STONE PAIN. 90 Capsule 1    oxyCODONE-Acetaminophen (PERCOCET PO) Take  by mouth.      albuterol 108 (90 Base) MCG/ACT Aero Soln inhalation aerosol 90 Puffs.      cyclobenzaprine (FLEXERIL) 10 mg Tab 1 Tablet.      dicyclomine (BENTYL) 20 MG Tab 1 Tablet.      levothyroxine (SYNTHROID) 75 MCG Tab 1 Tablet.       "liothyronine (CYTOMEL) 5 MCG Tab 0.005 mcg.      ondansetron (ZOFRAN ODT) 4 MG TABLET DISPERSIBLE 1 Tablet.      Spacer/Aero-Holding Chambers (AEROCHAMBER MAX W/FLOW-VU) Misc Aerochamber Plus Flow-Vu      HYDROmorphone (DILAUDID) 4 MG Tab hydromorphone 4 mg tablet      NEEDLE, DISP, 22 G (BD DISP NEEDLES) 22G X 1-1/2\" Misc 1 Each every 7 days. 10 Each 2     No current Epic-ordered facility-administered medications on file.       Past Medical History:   Diagnosis Date    Anxiety     Asthma     inhalers    Bipolar 2 disorder ([facility])     depression , anxiety    Bipolar disorder ([facility]) 11/11/2020    Cancer ([facility]) 1996    cervical    Depression     Hypothyroidism 11/11/2020    Migraine     Nephrolithiasis 11/11/2020    Pain     back    Seizure ([facility]) 2013    takes po meds    Stroke ([facility]) 2013    TIA    Urinary incontinence 11/11/2020       Past Surgical History:   Procedure Laterality Date    PA CYSTOSCOPY,INSERT URETERAL STENT Right 12/09/2020    Procedure: CYSTOSCOPY, WITH URETERAL STENT INSERTION;  Surgeon: Dorian Estrada M.D.;  Location: SURGERY [facility];  Service: Urology    PA CYSTO/URETERO/PYELOSCOPY, DX Right 12/09/2020    Procedure: URETEROSCOPY;  Surgeon: Dorian Estrada M.D.;  Location: SURGERY [facility];  Service: Urology    CHOLECYSTECTOMY  2009    ABDOMINAL HYSTERECTOMY TOTAL  2002    EXPLORATORY LAPAROTOMY      HAND SURGERY      3 x right hand, 1x left    LITHOTRIPSY Right 2014 and 2015    kidney stone    LYSIS ADHESIONS GENERAL      OOPHORECTOMY  2003, 2004,    ovarian cysct removal     PRIMARY C SECTION         Social History     Tobacco Use    Smoking status: Former     Packs/day: 2.00     Years: 13.00     Pack years: 26.00     Types: Cigarettes    Smokeless tobacco: Never   Vaping Use    Vaping Use: Never used   Substance Use Topics    Alcohol use: Not Currently     Comment: Used to drink, stopped comlpetely 2010's    Drug use: Yes     Types: Marijuana, Inhaled     Comment: Mirella  " "      Social History     Social History Narrative    Works as        Family History   Problem Relation Age of Onset    Kidney stones Brother     Stroke Mother     Diabetes Father     Hypertension Father     Hyperlipidemia Father     Kidney Disease Neg Hx        ROS: per hpi    Gen: no fevers/chills  Pulm: no sob, no cough  CV: no chest pain, no palpitations, no edema  GI: no nausea/vomiting, no diarrhea  Skin: no rash    Exam: /66 (BP Location: Left arm, Patient Position: Sitting, BP Cuff Size: Adult)   Pulse 74   Temp 36.4 °C (97.5 °F) (Temporal)   Resp 16   Ht 1.626 m (5' 4\")   Wt 70.9 kg (156 lb 6.4 oz)   SpO2 95%  Body mass index is 26.85 kg/m².    General: Normal appearing. No distress.  HEENT: Normocephalic. Eyes conjunctiva clear lids without ptosis, pupils equal and reactive to light accommodation, ears normal shape and contour, canals are clear bilaterally, tympanic membranes are benign, nasal mucosa benign, oropharynx is without erythema, edema or exudates.   Neck: Supple without JVD or bruit. Thyroid is not enlarged.  Pulmonary: Clear to ausculation.  Normal effort. No rales, ronchi, or wheezing.  Cardiovascular: Regular rate and rhythm without murmur. Carotid and radial pulses are intact and equal bilaterally.  Abdomen: Soft, nontender, nondistended. Normal bowel sounds. Liver and spleen are not palpable  Neurologic: Grossly nonfocal  Lymph: No cervical, supraclavicular or axillary lymph nodes are palpable  Skin: Warm and dry.    0.5 mm raised light brown papule, well circumscribed. No erythema, edema or drainage.  Musculoskeletal: Normal gait. No extremity cyanosis, clubbing, or edema.  Psych: Normal mood and affect. Alert and oriented x3. Judgment and insight is normal.    Please note that this dictation was created using voice recognition software. I have made every reasonable attempt to correct obvious errors, but I expect that there are errors of grammar and possibly " content that I did not discover before finalizing the note.      Assessment/Plan    47 y.o. female with the following -    1. Encounter to establish care    2. Skin lesion of back  Acute on chronic condition. No s/s of infectious etiology.  We will place referral to dermatology for evaluation and management.    - Referral to Dermatology    3. Hypothyroidism, unspecified type  Chronic, uncontrolled condition on levothyroxine. Recent TSH revealed hyperactive thyroid.  Patient is managed by endocrinology.  She is due to repeat lab work in 6 to 8 weeks.  Recommend to  follow-up with endocrinology as scheduled.  The symptoms discussed.    4. Estrogen excess  Chronic, stable condition on HRT.  Managed by endocrinology.  Recommend to follow-up as scheduled.    5. CKD (chronic kidney disease) stage 2, GFR 60-89 ml/min  Chronic, stable condition on potassium citrate and HCTZ.  Managed by nephrology.  Recommend to follow-up with follow nephrology as scheduled.  Recommend to increase fluid intake.  And avoid nephrotoxic medication such as NSAIDs.    6. Bipolar affective disorder, remission status unspecified (HCC)  7. Generalized anxiety disorder  Chronic and stable condition on Cariprazine and Lexapro.  Continue current regimen.  Managed by psychiatry.  Recommend to follow-up as scheduled.  ED symptoms discussed.    8. Seizure disorder (HCC)  9. Migraine with aura and without status migrainosus, not intractable  Chronic and stable condition on Lamictal and Emgality.  Managed by neurology.  Recommend to continue current regimen.  Recommend to follow-up with neurology as scheduled.  ED symptoms discussed.    Medical Decision Making/Course:  In the course of preparing for this visit with review of the pertinent past medical history, recent and past clinic visits, current medications, and performing chart, immunization, medical history and medication reconciliation, and in the further course of obtaining the current history  pertinent to the clinic visit today, performing an exam and evaluation, ordering and independently evaluating labs, tests, and/or procedures, prescribing any recommended new medications as noted above, providing any pertinent counseling and education and recommending further coordination of care. This was discussed with patient in a shared-decision making conversation, and they understand and agreed with plan of care.     Return if symptoms worsen or fail to improve.    Thank you, Radha APARICIO  [facility]    Please note that this dictation was created using voice recognition software. I have made every reasonable attempt to correct obvious errors, but I expect that there are errors of grammar and possibly content that I did not discover before finalizing the note.

## 2023-05-19 DIAGNOSIS — F51.01 PRIMARY INSOMNIA: ICD-10-CM

## 2023-05-19 RX ORDER — ESZOPICLONE 1 MG/1
1 TABLET, FILM COATED ORAL
Qty: 30 TABLET | Refills: 0 | Status: SHIPPED | OUTPATIENT
Start: 2023-05-19 | End: 2023-06-22 | Stop reason: SDUPTHER

## 2023-05-23 ENCOUNTER — HOSPITAL ENCOUNTER (OUTPATIENT)
Dept: LAB | Facility: MEDICAL CENTER | Age: 48
End: 2023-05-23
Attending: INTERNAL MEDICINE
Payer: COMMERCIAL

## 2023-05-23 LAB
25(OH)D3 SERPL-MCNC: 70 NG/ML (ref 30–100)
DHEA-S SERPL-MCNC: 396 UG/DL (ref 35.4–256)
ESTRADIOL SERPL-MCNC: 8.1 PG/ML
FSH SERPL-ACNC: 129 MIU/ML
LH SERPL-ACNC: 52.5 IU/L
T3FREE SERPL-MCNC: 2.58 PG/ML (ref 2–4.4)
T4 FREE SERPL-MCNC: 1.35 NG/DL (ref 0.93–1.7)
TESTOST SERPL-MCNC: 31 NG/DL (ref 9–75)
THYROPEROXIDASE AB SERPL-ACNC: <9 IU/ML (ref 0–9)
TSH SERPL DL<=0.005 MIU/L-ACNC: 0.87 UIU/ML (ref 0.38–5.33)
VIT B12 SERPL-MCNC: 549 PG/ML (ref 211–911)

## 2023-05-23 PROCEDURE — 82670 ASSAY OF TOTAL ESTRADIOL: CPT

## 2023-05-23 PROCEDURE — 84443 ASSAY THYROID STIM HORMONE: CPT

## 2023-05-23 PROCEDURE — 36415 COLL VENOUS BLD VENIPUNCTURE: CPT

## 2023-05-23 PROCEDURE — 84270 ASSAY OF SEX HORMONE GLOBUL: CPT

## 2023-05-23 PROCEDURE — 84403 ASSAY OF TOTAL TESTOSTERONE: CPT

## 2023-05-23 PROCEDURE — 83001 ASSAY OF GONADOTROPIN (FSH): CPT

## 2023-05-23 PROCEDURE — 82157 ASSAY OF ANDROSTENEDIONE: CPT

## 2023-05-23 PROCEDURE — 86376 MICROSOMAL ANTIBODY EACH: CPT

## 2023-05-23 PROCEDURE — 83002 ASSAY OF GONADOTROPIN (LH): CPT

## 2023-05-23 PROCEDURE — 82627 DEHYDROEPIANDROSTERONE: CPT

## 2023-05-23 PROCEDURE — 82626 DEHYDROEPIANDROSTERONE: CPT

## 2023-05-23 PROCEDURE — 82607 VITAMIN B-12: CPT

## 2023-05-23 PROCEDURE — 84481 FREE ASSAY (FT-3): CPT

## 2023-05-23 PROCEDURE — 84439 ASSAY OF FREE THYROXINE: CPT

## 2023-05-23 PROCEDURE — 82306 VITAMIN D 25 HYDROXY: CPT

## 2023-05-24 ENCOUNTER — APPOINTMENT (RX ONLY)
Dept: URBAN - METROPOLITAN AREA CLINIC 6 | Facility: CLINIC | Age: 48
Setting detail: DERMATOLOGY
End: 2023-05-24

## 2023-05-24 DIAGNOSIS — L82.1 OTHER SEBORRHEIC KERATOSIS: ICD-10-CM

## 2023-05-24 DIAGNOSIS — D18.0 HEMANGIOMA: ICD-10-CM

## 2023-05-24 DIAGNOSIS — L81.4 OTHER MELANIN HYPERPIGMENTATION: ICD-10-CM

## 2023-05-24 DIAGNOSIS — D22 MELANOCYTIC NEVI: ICD-10-CM

## 2023-05-24 DIAGNOSIS — Z71.89 OTHER SPECIFIED COUNSELING: ICD-10-CM

## 2023-05-24 PROBLEM — D22.61 MELANOCYTIC NEVI OF RIGHT UPPER LIMB, INCLUDING SHOULDER: Status: ACTIVE | Noted: 2023-05-24

## 2023-05-24 PROBLEM — D22.71 MELANOCYTIC NEVI OF RIGHT LOWER LIMB, INCLUDING HIP: Status: ACTIVE | Noted: 2023-05-24

## 2023-05-24 PROBLEM — D22.5 MELANOCYTIC NEVI OF TRUNK: Status: ACTIVE | Noted: 2023-05-24

## 2023-05-24 PROBLEM — D22.62 MELANOCYTIC NEVI OF LEFT UPPER LIMB, INCLUDING SHOULDER: Status: ACTIVE | Noted: 2023-05-24

## 2023-05-24 PROBLEM — D18.01 HEMANGIOMA OF SKIN AND SUBCUTANEOUS TISSUE: Status: ACTIVE | Noted: 2023-05-24

## 2023-05-24 PROBLEM — D22.72 MELANOCYTIC NEVI OF LEFT LOWER LIMB, INCLUDING HIP: Status: ACTIVE | Noted: 2023-05-24

## 2023-05-24 LAB — SHBG SERPL-SCNC: 30 NMOL/L (ref 17–125)

## 2023-05-24 PROCEDURE — 99213 OFFICE O/P EST LOW 20 MIN: CPT

## 2023-05-24 PROCEDURE — ? COUNSELING

## 2023-05-24 ASSESSMENT — LOCATION SIMPLE DESCRIPTION DERM
LOCATION SIMPLE: RIGHT PRETIBIAL REGION
LOCATION SIMPLE: RIGHT FOREARM
LOCATION SIMPLE: LEFT KNEE
LOCATION SIMPLE: RIGHT KNEE
LOCATION SIMPLE: LEFT PRETIBIAL REGION
LOCATION SIMPLE: RIGHT THIGH
LOCATION SIMPLE: RIGHT LOWER BACK
LOCATION SIMPLE: LEFT FOREARM
LOCATION SIMPLE: CHEST
LOCATION SIMPLE: LEFT UPPER ARM
LOCATION SIMPLE: LEFT THIGH
LOCATION SIMPLE: RIGHT UPPER ARM
LOCATION SIMPLE: ABDOMEN

## 2023-05-24 ASSESSMENT — LOCATION DETAILED DESCRIPTION DERM
LOCATION DETAILED: LOWER STERNUM
LOCATION DETAILED: RIGHT ANTECUBITAL SKIN
LOCATION DETAILED: LEFT KNEE
LOCATION DETAILED: RIGHT ANTERIOR PROXIMAL UPPER ARM
LOCATION DETAILED: LEFT ANTERIOR DISTAL THIGH
LOCATION DETAILED: RIGHT SUPERIOR MEDIAL MIDBACK
LOCATION DETAILED: LEFT ANTERIOR PROXIMAL UPPER ARM
LOCATION DETAILED: RIGHT PROXIMAL PRETIBIAL REGION
LOCATION DETAILED: RIGHT VENTRAL PROXIMAL FOREARM
LOCATION DETAILED: PERIUMBILICAL SKIN
LOCATION DETAILED: EPIGASTRIC SKIN
LOCATION DETAILED: LEFT PROXIMAL PRETIBIAL REGION
LOCATION DETAILED: LEFT ANTERIOR DISTAL UPPER ARM
LOCATION DETAILED: RIGHT ANTERIOR DISTAL THIGH
LOCATION DETAILED: LEFT VENTRAL PROXIMAL FOREARM
LOCATION DETAILED: RIGHT ANTERIOR DISTAL UPPER ARM
LOCATION DETAILED: RIGHT KNEE

## 2023-05-24 ASSESSMENT — LOCATION ZONE DERM
LOCATION ZONE: TRUNK
LOCATION ZONE: LEG
LOCATION ZONE: ARM

## 2023-05-26 LAB
ANDROST SERPL-MCNC: 0.73 NG/ML (ref 0.13–0.82)
DHEA SERPL-MCNC: 3.63 NG/ML (ref 0.63–4.7)

## 2023-05-30 PROCEDURE — RXMED WILLOW AMBULATORY MEDICATION CHARGE: Performed by: PSYCHIATRY & NEUROLOGY

## 2023-06-01 ENCOUNTER — DOCUMENTATION (OUTPATIENT)
Dept: PHARMACY | Facility: MEDICAL CENTER | Age: 48
End: 2023-06-01
Payer: COMMERCIAL

## 2023-06-01 ENCOUNTER — PHARMACY VISIT (OUTPATIENT)
Dept: PHARMACY | Facility: MEDICAL CENTER | Age: 48
End: 2023-06-01
Payer: COMMERCIAL

## 2023-06-01 NOTE — PROGRESS NOTES
05/31/23: Spoke with [MARIO]. She is doing well on the [Emgality 120mg/ml]. She reports no side effects to the medication and no new allergies. She reports 0 missed doses and has [0] on hand, due to inj 06/08. Sending delivery for [06/01] via . Needs supplies needed, no questions for [facility]

## 2023-06-22 DIAGNOSIS — F51.01 PRIMARY INSOMNIA: ICD-10-CM

## 2023-06-27 PROCEDURE — RXMED WILLOW AMBULATORY MEDICATION CHARGE: Performed by: PSYCHIATRY & NEUROLOGY

## 2023-06-29 ENCOUNTER — DOCUMENTATION (OUTPATIENT)
Dept: PHARMACY | Facility: MEDICAL CENTER | Age: 48
End: 2023-06-29
Payer: COMMERCIAL

## 2023-06-29 NOTE — PROGRESS NOTES
06/28/23: Spoke with Earnestine. She is doing well on the Emgality 120mg/mL. She reports no side effects to the medication and no new allergies. She reports 0 missed doses and has 0 doses on hand, with next dose due 07/08. Sending delivery for 07/03 via . $0 copay.

## 2023-07-03 ENCOUNTER — PHARMACY VISIT (OUTPATIENT)
Dept: PHARMACY | Facility: MEDICAL CENTER | Age: 48
End: 2023-07-03
Payer: COMMERCIAL

## 2023-07-03 RX ORDER — ESZOPICLONE 1 MG/1
1 TABLET, FILM COATED ORAL
Qty: 30 TABLET | Refills: 0 | Status: SHIPPED | OUTPATIENT
Start: 2023-07-03 | End: 2023-08-31

## 2023-07-10 NOTE — TELEPHONE ENCOUNTER
Hello,      Please send a new script for the [facility]ality [facility]. Please fill for Chysna since he is out.    Thanks    Erythromycin Pregnancy And Lactation Text: This medication is Pregnancy Category B and is considered safe during pregnancy. It is also excreted in breast milk.

## 2023-07-19 ENCOUNTER — DOCUMENTATION (OUTPATIENT)
Dept: HEALTH INFORMATION MANAGEMENT | Facility: OTHER | Age: 48
End: 2023-07-19
Payer: COMMERCIAL

## 2023-07-28 DIAGNOSIS — G40.909 SEIZURE DISORDER (HCC): ICD-10-CM

## 2023-07-31 ENCOUNTER — TELEPHONE (OUTPATIENT)
Dept: NEUROLOGY | Facility: MEDICAL CENTER | Age: 48
End: 2023-07-31
Payer: COMMERCIAL

## 2023-07-31 ENCOUNTER — OFFICE VISIT (OUTPATIENT)
Dept: BEHAVIORAL HEALTH | Facility: CLINIC | Age: 48
End: 2023-07-31
Payer: COMMERCIAL

## 2023-07-31 DIAGNOSIS — F31.81 BIPOLAR 2 DISORDER (HCC): ICD-10-CM

## 2023-07-31 DIAGNOSIS — F51.04 CHRONIC INSOMNIA: ICD-10-CM

## 2023-07-31 DIAGNOSIS — F12.90 CANNABIS USE DISORDER: ICD-10-CM

## 2023-07-31 PROCEDURE — 99214 OFFICE O/P EST MOD 30 MIN: CPT | Mod: GC | Performed by: PSYCHIATRY & NEUROLOGY

## 2023-07-31 RX ORDER — GALCANEZUMAB 120 MG/ML
120 INJECTION, SOLUTION SUBCUTANEOUS
Qty: 1 ML | Refills: 5 | Status: SHIPPED | OUTPATIENT
Start: 2023-07-31 | End: 2023-12-29 | Stop reason: SDUPTHER

## 2023-07-31 ASSESSMENT — ANXIETY QUESTIONNAIRES
IF YOU CHECKED OFF ANY PROBLEMS ON THIS QUESTIONNAIRE, HOW DIFFICULT HAVE THESE PROBLEMS MADE IT FOR YOU TO DO YOUR WORK, TAKE CARE OF THINGS AT HOME, OR GET ALONG WITH OTHER PEOPLE: SOMEWHAT DIFFICULT
2. NOT BEING ABLE TO STOP OR CONTROL WORRYING: NOT AT ALL
3. WORRYING TOO MUCH ABOUT DIFFERENT THINGS: NOT AT ALL
1. FEELING NERVOUS, ANXIOUS, OR ON EDGE: SEVERAL DAYS
GAD7 TOTAL SCORE: 2
6. BECOMING EASILY ANNOYED OR IRRITABLE: NOT AT ALL
5. BEING SO RESTLESS THAT IT IS HARD TO SIT STILL: NOT AT ALL
4. TROUBLE RELAXING: SEVERAL DAYS
7. FEELING AFRAID AS IF SOMETHING AWFUL MIGHT HAPPEN: NOT AT ALL

## 2023-07-31 NOTE — TELEPHONE ENCOUNTER
Emgality 120mg/ml SOSY    Request rec'd via [facility], RTC LUKE Bee paid copay $120.00 #3ml/90 DS or $40.00 #1/30 DS no real $$ saving as is $40.00 per 30 DS notifying liaison Carlie Salomon. - 07/31/2023 4:19pm
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
## 2023-08-01 PROBLEM — F12.90 CANNABIS USE DISORDER: Status: ACTIVE | Noted: 2023-08-01

## 2023-08-01 PROBLEM — F31.81 BIPOLAR 2 DISORDER (HCC): Status: ACTIVE | Noted: 2020-11-11

## 2023-08-01 PROBLEM — F51.04 CHRONIC INSOMNIA: Status: ACTIVE | Noted: 2023-08-01

## 2023-08-01 PROCEDURE — RXMED WILLOW AMBULATORY MEDICATION CHARGE: Performed by: PSYCHIATRY & NEUROLOGY

## 2023-08-01 RX ORDER — PHENTERMINE HYDROCHLORIDE 37.5 MG/1
37.5 TABLET ORAL
COMMUNITY
Start: 2023-07-20

## 2023-08-01 RX ORDER — SYRINGE WITH NEEDLE, 1 ML 25GX1"
SYRINGE, EMPTY DISPOSABLE MISCELLANEOUS
COMMUNITY
Start: 2023-06-15 | End: 2024-02-23

## 2023-08-02 ENCOUNTER — DOCUMENTATION (OUTPATIENT)
Dept: PHARMACY | Facility: MEDICAL CENTER | Age: 48
End: 2023-08-02
Payer: COMMERCIAL

## 2023-08-02 NOTE — PROGRESS NOTES
08/02/23: Spoke with Earnestine. She is doing well on the Emgality 120mg/mL. She reports no side effects to the medication and no new allergies. She reports 0 missed doses and has 0 doses on hand, with next dose due 08/08. Sending delivery for 08/04 via , afternoon delivery. $0 copay.     
Yes

## 2023-08-03 DIAGNOSIS — N20.0 NEPHROLITHIASIS: ICD-10-CM

## 2023-08-03 RX ORDER — POTASSIUM CITRATE 15 MEQ/1
2 TABLET, EXTENDED RELEASE ORAL 2 TIMES DAILY
Qty: 360 TABLET | Refills: 3 | Status: SHIPPED | OUTPATIENT
Start: 2023-08-03

## 2023-08-04 ENCOUNTER — PHARMACY VISIT (OUTPATIENT)
Dept: PHARMACY | Facility: MEDICAL CENTER | Age: 48
End: 2023-08-04
Payer: COMMERCIAL

## 2023-08-04 DIAGNOSIS — F51.01 PRIMARY INSOMNIA: ICD-10-CM

## 2023-08-04 DIAGNOSIS — F31.62 BIPOLAR DISORDER, CURRENT EPISODE MIXED, MODERATE (HCC): ICD-10-CM

## 2023-08-11 RX ORDER — ESZOPICLONE 1 MG/1
1 TABLET, FILM COATED ORAL
Qty: 30 TABLET | Refills: 0 | OUTPATIENT
Start: 2023-08-11 | End: 2023-09-10

## 2023-08-11 RX ORDER — ESCITALOPRAM OXALATE 10 MG/1
10 TABLET ORAL EVERY MORNING
Qty: 90 TABLET | Refills: 0 | OUTPATIENT
Start: 2023-08-11 | End: 2023-11-09

## 2023-08-28 ENCOUNTER — TELEPHONE (OUTPATIENT)
Dept: PHARMACY | Facility: MEDICAL CENTER | Age: 48
End: 2023-08-28
Payer: COMMERCIAL

## 2023-08-28 PROCEDURE — RXMED WILLOW AMBULATORY MEDICATION CHARGE: Performed by: PSYCHIATRY & NEUROLOGY

## 2023-08-28 NOTE — TELEPHONE ENCOUNTER
Contact:      Phone number: [phone], Mobile    Name of person spoken with and relationship to patient: Earnestine Lindsay, Self   Patient’s Adherence:      How patient is doing on medication: Good    How many missed doses and reason: 0    Any new medications: No    Any new conditions: No    Any new allergies: No    Any new side effects: No    Any new diagnoses: No    How many doses remainin    Did patient want to speak with pharmacist: No  Delivery:      Delivery date and method:  via     Needs by Date:     Signature required: No    Any additional details for : Afternoon delivery   Teach Appointment Date: 2022  Shipping Address: [address] [place] [address], [place] [address]  Medication (name, strength and dose): Emgality 120mg/mL-120mg q30d  Copay: $0  Payment Method: N/A, $0 copay   Supplies: None  Additional Information: Pt refilled Emgality via Web. Next call date: 10/02.

## 2023-08-29 ENCOUNTER — PHARMACY VISIT (OUTPATIENT)
Dept: PHARMACY | Facility: MEDICAL CENTER | Age: 48
End: 2023-08-29
Payer: COMMERCIAL

## 2023-08-30 DIAGNOSIS — F51.01 PRIMARY INSOMNIA: ICD-10-CM

## 2023-08-31 RX ORDER — ESZOPICLONE 1 MG/1
1 TABLET, FILM COATED ORAL
Qty: 30 TABLET | Refills: 0 | Status: SHIPPED | OUTPATIENT
Start: 2023-08-31 | End: 2023-10-05

## 2023-09-01 ENCOUNTER — HOSPITAL ENCOUNTER (OUTPATIENT)
Dept: LAB | Facility: MEDICAL CENTER | Age: 48
End: 2023-09-01
Attending: CLINICAL NURSE SPECIALIST
Payer: COMMERCIAL

## 2023-09-01 LAB
T3FREE SERPL-MCNC: 2.38 PG/ML (ref 2–4.4)
T4 FREE SERPL-MCNC: 1.54 NG/DL (ref 0.93–1.7)
TSH SERPL DL<=0.005 MIU/L-ACNC: 0.73 UIU/ML (ref 0.38–5.33)

## 2023-09-01 PROCEDURE — 84439 ASSAY OF FREE THYROXINE: CPT

## 2023-09-01 PROCEDURE — 84402 ASSAY OF FREE TESTOSTERONE: CPT

## 2023-09-01 PROCEDURE — 36415 COLL VENOUS BLD VENIPUNCTURE: CPT

## 2023-09-01 PROCEDURE — 84403 ASSAY OF TOTAL TESTOSTERONE: CPT

## 2023-09-01 PROCEDURE — 84443 ASSAY THYROID STIM HORMONE: CPT

## 2023-09-01 PROCEDURE — 84481 FREE ASSAY (FT-3): CPT

## 2023-09-01 PROCEDURE — 84270 ASSAY OF SEX HORMONE GLOBUL: CPT

## 2023-09-05 LAB
SHBG SERPL-SCNC: 23 NMOL/L (ref 17–125)
TESTOST FREE SERPL-MCNC: 121.4 PG/ML (ref 1.1–5.8)
TESTOST SERPL-MCNC: 535 NG/DL (ref 9–55)

## 2023-09-26 PROCEDURE — RXMED WILLOW AMBULATORY MEDICATION CHARGE: Performed by: PSYCHIATRY & NEUROLOGY

## 2023-09-27 ENCOUNTER — PHARMACY VISIT (OUTPATIENT)
Dept: PHARMACY | Facility: MEDICAL CENTER | Age: 48
End: 2023-09-27
Payer: COMMERCIAL

## 2023-10-02 ENCOUNTER — TELEPHONE (OUTPATIENT)
Dept: PHARMACY | Facility: MEDICAL CENTER | Age: 48
End: 2023-10-02
Payer: COMMERCIAL

## 2023-10-03 NOTE — TELEPHONE ENCOUNTER
Contact:      Phone number: [phone], Mobile    Name of person spoken with and relationship to patient: Earnestine Lindsay, Self  Patient’s Adherence:      How patient is doing on medication: Good    How many missed doses and reason: 0    Any new medications: No    Any new conditions: No    Any new allergies: No    Any new side effects: No    Any new diagnoses: No    How many doses remainin (10/08)    Did patient want to speak with pharmacist: No  Delivery:      Delivery date and method:  via     Needs by Date: 10/08    Signature required: No    Any additional details for : None   Teach Appointment Date: 2022  Shipping Address: [address] [place] [address] [place] [address]  Medication (name, strength and dose): Emgality 120mg/mL   Copay: $0  Payment Method: N/A, $0 copay  Supplies: None  Additional Information: 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 She confirmed receiving delivery of Emgality on  and scheduled a refill call in 4 weeks. Next call date: 10/30.

## 2023-10-05 DIAGNOSIS — F51.01 PRIMARY INSOMNIA: ICD-10-CM

## 2023-10-05 RX ORDER — ESZOPICLONE 1 MG/1
1 TABLET, FILM COATED ORAL
Qty: 30 TABLET | Refills: 0 | Status: SHIPPED | OUTPATIENT
Start: 2023-10-05 | End: 2023-10-30 | Stop reason: SDUPTHER

## 2023-10-20 ENCOUNTER — HOSPITAL ENCOUNTER (OUTPATIENT)
Dept: LAB | Facility: MEDICAL CENTER | Age: 48
End: 2023-10-20
Attending: CLINICAL NURSE SPECIALIST
Payer: COMMERCIAL

## 2023-10-20 DIAGNOSIS — N18.2 CKD (CHRONIC KIDNEY DISEASE) STAGE 2, GFR 60-89 ML/MIN: ICD-10-CM

## 2023-10-20 DIAGNOSIS — N20.0 NEPHROLITHIASIS: Chronic | ICD-10-CM

## 2023-10-20 DIAGNOSIS — N25.1 DIABETES INSIPIDUS, NEPHROGENIC (HCC): Chronic | ICD-10-CM

## 2023-10-20 LAB
25(OH)D3 SERPL-MCNC: 64 NG/ML (ref 30–100)
ALBUMIN SERPL BCP-MCNC: 4.4 G/DL (ref 3.2–4.9)
ANION GAP SERPL CALC-SCNC: 14 MMOL/L (ref 7–16)
APPEARANCE UR: CLEAR
BACTERIA #/AREA URNS HPF: ABNORMAL /HPF
BILIRUB UR QL STRIP.AUTO: NEGATIVE
BUN SERPL-MCNC: 16 MG/DL (ref 8–22)
CALCIUM SERPL-MCNC: 9.5 MG/DL (ref 8.4–10.2)
CHLORIDE SERPL-SCNC: 100 MMOL/L (ref 96–112)
CO2 SERPL-SCNC: 25 MMOL/L (ref 20–33)
COLOR UR: YELLOW
CREAT SERPL-MCNC: 1.08 MG/DL (ref 0.5–1.4)
EPI CELLS #/AREA URNS HPF: ABNORMAL /HPF
ERYTHROCYTE [DISTWIDTH] IN BLOOD BY AUTOMATED COUNT: 43 FL (ref 35.9–50)
GFR SERPLBLD CREATININE-BSD FMLA CKD-EPI: 62 ML/MIN/1.73 M 2
GLUCOSE SERPL-MCNC: 84 MG/DL (ref 65–99)
GLUCOSE UR STRIP.AUTO-MCNC: NEGATIVE MG/DL
HCT VFR BLD AUTO: 46.3 % (ref 37–47)
HGB BLD-MCNC: 15.7 G/DL (ref 12–16)
KETONES UR STRIP.AUTO-MCNC: NEGATIVE MG/DL
LEUKOCYTE ESTERASE UR QL STRIP.AUTO: ABNORMAL
MCH RBC QN AUTO: 32.9 PG (ref 27–33)
MCHC RBC AUTO-ENTMCNC: 33.9 G/DL (ref 32.2–35.5)
MCV RBC AUTO: 97.1 FL (ref 81.4–97.8)
MICRO URNS: ABNORMAL
NITRITE UR QL STRIP.AUTO: NEGATIVE
PH UR STRIP.AUTO: 8 [PH] (ref 5–8)
PHOSPHATE SERPL-MCNC: 3.5 MG/DL (ref 2.5–4.5)
PLATELET # BLD AUTO: 317 K/UL (ref 164–446)
PMV BLD AUTO: 9.6 FL (ref 9–12.9)
POTASSIUM SERPL-SCNC: 3.5 MMOL/L (ref 3.6–5.5)
PROT UR QL STRIP: NEGATIVE MG/DL
RBC # BLD AUTO: 4.77 M/UL (ref 4.2–5.4)
RBC # URNS HPF: ABNORMAL /HPF
RBC UR QL AUTO: NEGATIVE
SODIUM SERPL-SCNC: 139 MMOL/L (ref 135–145)
SP GR UR STRIP.AUTO: 1.01
URATE SERPL-MCNC: 5.2 MG/DL (ref 1.9–8.2)
WBC # BLD AUTO: 5.7 K/UL (ref 4.8–10.8)
WBC #/AREA URNS HPF: ABNORMAL /HPF

## 2023-10-20 PROCEDURE — 82040 ASSAY OF SERUM ALBUMIN: CPT

## 2023-10-20 PROCEDURE — 84156 ASSAY OF PROTEIN URINE: CPT

## 2023-10-20 PROCEDURE — 83970 ASSAY OF PARATHORMONE: CPT

## 2023-10-20 PROCEDURE — 84100 ASSAY OF PHOSPHORUS: CPT

## 2023-10-20 PROCEDURE — 81001 URINALYSIS AUTO W/SCOPE: CPT

## 2023-10-20 PROCEDURE — 82043 UR ALBUMIN QUANTITATIVE: CPT

## 2023-10-20 PROCEDURE — 82570 ASSAY OF URINE CREATININE: CPT | Mod: 91

## 2023-10-20 PROCEDURE — 85027 COMPLETE CBC AUTOMATED: CPT

## 2023-10-20 PROCEDURE — 36415 COLL VENOUS BLD VENIPUNCTURE: CPT

## 2023-10-20 PROCEDURE — 80048 BASIC METABOLIC PNL TOTAL CA: CPT

## 2023-10-20 PROCEDURE — 84550 ASSAY OF BLOOD/URIC ACID: CPT

## 2023-10-20 PROCEDURE — 82306 VITAMIN D 25 HYDROXY: CPT

## 2023-10-21 LAB
CREAT UR-MCNC: 75.96 MG/DL
CREAT UR-MCNC: 77.03 MG/DL
MICROALBUMIN UR-MCNC: <1.2 MG/DL
MICROALBUMIN/CREAT UR: NORMAL MG/G (ref 0–30)
PROT UR-MCNC: 7 MG/DL (ref 0–15)
PROT/CREAT UR: 92 MG/G (ref 10–107)
PTH-INTACT SERPL-MCNC: 72.2 PG/ML (ref 14–72)

## 2023-10-24 ENCOUNTER — HOSPITAL ENCOUNTER (OUTPATIENT)
Facility: MEDICAL CENTER | Age: 48
End: 2023-10-24
Attending: INTERNAL MEDICINE
Payer: COMMERCIAL

## 2023-10-24 PROCEDURE — 83986 ASSAY PH BODY FLUID NOS: CPT

## 2023-10-24 PROCEDURE — 82570 ASSAY OF URINE CREATININE: CPT

## 2023-10-24 PROCEDURE — 83945 ASSAY OF OXALATE: CPT

## 2023-10-24 PROCEDURE — 84300 ASSAY OF URINE SODIUM: CPT

## 2023-10-24 PROCEDURE — 82436 ASSAY OF URINE CHLORIDE: CPT

## 2023-10-24 PROCEDURE — 84133 ASSAY OF URINE POTASSIUM: CPT

## 2023-10-24 PROCEDURE — 84560 ASSAY OF URINE/URIC ACID: CPT

## 2023-10-24 PROCEDURE — 82507 ASSAY OF CITRATE: CPT

## 2023-10-24 PROCEDURE — 83735 ASSAY OF MAGNESIUM: CPT

## 2023-10-24 PROCEDURE — 82340 ASSAY OF CALCIUM IN URINE: CPT

## 2023-10-24 PROCEDURE — 84105 ASSAY OF URINE PHOSPHORUS: CPT

## 2023-10-30 ENCOUNTER — OFFICE VISIT (OUTPATIENT)
Dept: BEHAVIORAL HEALTH | Facility: CLINIC | Age: 48
End: 2023-10-30
Payer: COMMERCIAL

## 2023-10-30 ENCOUNTER — TELEPHONE (OUTPATIENT)
Dept: PHARMACY | Facility: MEDICAL CENTER | Age: 48
End: 2023-10-30
Payer: COMMERCIAL

## 2023-10-30 DIAGNOSIS — F31.81 BIPOLAR 2 DISORDER (HCC): ICD-10-CM

## 2023-10-30 DIAGNOSIS — F41.1 GENERALIZED ANXIETY DISORDER: ICD-10-CM

## 2023-10-30 DIAGNOSIS — F51.01 PRIMARY INSOMNIA: ICD-10-CM

## 2023-10-30 DIAGNOSIS — F12.90 CANNABIS USE DISORDER: ICD-10-CM

## 2023-10-30 LAB
CALCIUM 24H UR-MCNC: 1.3 MG/DL
CALCIUM 24H UR-MRATE: 64 MG/D (ref 100–250)
CHLORIDE 24H UR-SCNC: 38 MMOL/L
CHLORIDE 24H UR-SRATE: 186 MMOL/D (ref 140–250)
CITRATE 24H UR-MCNC: 156 MG/L
CITRATE 24H UR-MRATE: 764 MG/D (ref 320–1240)
COLLECT DURATION TIME SPEC: 24 HRS
CREAT 24H UR-MCNC: 46 MG/DL
CREAT 24H UR-MRATE: 2254 MG/D (ref 700–1600)
MAGNESIUM 24H UR-MCNC: 5.9 MG/DL
MAGNESIUM 24H UR-MRATE: 289 MG/D (ref 12–199)
OXALATE 24H UR-MCNC: 12 MG/L
OXALATE 24H UR-MRATE: 59 MG/D (ref 13–40)
PATHOLOGY STUDY: ABNORMAL
PH UR: 6.9 [PH] (ref 5–7.5)
PHOSPHATE 24H UR-MCNC: 33 MG/DL
PHOSPHATE 24H UR-MRATE: 1617 MG/D (ref 400–1300)
POTASSIUM 24H UR-SCNC: 42 MMOL/L
POTASSIUM 24H UR-SRATE: 206 MMOL/D (ref 25–125)
SODIUM 24H UR-SCNC: 37 MMOL/L
SODIUM 24H UR-SRATE: 181 MMOL/D (ref 51–286)
TOTAL VOLUME 1105: ABNORMAL ML
URATE 24H UR-MCNC: 12.6 MG/DL
URATE 24H UR-MRATE: 617 MG/D (ref 250–750)

## 2023-10-30 PROCEDURE — 99214 OFFICE O/P EST MOD 30 MIN: CPT | Mod: GC | Performed by: PSYCHIATRY & NEUROLOGY

## 2023-10-30 PROCEDURE — RXMED WILLOW AMBULATORY MEDICATION CHARGE: Performed by: PSYCHIATRY & NEUROLOGY

## 2023-10-30 RX ORDER — ESZOPICLONE 1 MG/1
1 TABLET, FILM COATED ORAL
Qty: 30 TABLET | Refills: 0 | Status: SHIPPED | OUTPATIENT
Start: 2024-01-03 | End: 2024-01-19 | Stop reason: SDUPTHER

## 2023-10-30 RX ORDER — ESZOPICLONE 1 MG/1
1 TABLET, FILM COATED ORAL
Qty: 30 TABLET | Refills: 0 | Status: SHIPPED | OUTPATIENT
Start: 2023-11-04 | End: 2023-12-04

## 2023-10-30 RX ORDER — CARIPRAZINE 3 MG/1
3 CAPSULE, GELATIN COATED ORAL EVERY EVENING
Qty: 30 CAPSULE | Refills: 3 | Status: SHIPPED | OUTPATIENT
Start: 2023-10-30 | End: 2024-02-02 | Stop reason: SDUPTHER

## 2023-10-30 RX ORDER — CARIPRAZINE 3 MG/1
3 CAPSULE, GELATIN COATED ORAL EVERY EVENING
COMMUNITY
Start: 2023-10-11 | End: 2023-10-30 | Stop reason: SDUPTHER

## 2023-10-30 RX ORDER — ESCITALOPRAM OXALATE 10 MG/1
10 TABLET ORAL EVERY MORNING
Qty: 90 TABLET | Refills: 1 | Status: SHIPPED | OUTPATIENT
Start: 2023-10-30

## 2023-10-30 RX ORDER — LIOTHYRONINE SODIUM 5 UG/1
10 TABLET ORAL DAILY
COMMUNITY
Start: 2023-09-15

## 2023-10-30 RX ORDER — ESZOPICLONE 1 MG/1
1 TABLET, FILM COATED ORAL
Qty: 30 TABLET | Refills: 0 | Status: SHIPPED | OUTPATIENT
Start: 2023-12-04 | End: 2024-01-03
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
## 2023-10-31 NOTE — TELEPHONE ENCOUNTER
Contact:      Phone number: [phone], Mobile    Name of person spoken with and relationship to patient: Earnestine Lindsay, Self   Patient’s Adherence:      How patient is doing on medication: Good    How many missed doses and reason: 0    Any new medications: Yes, restarted Levothyroxine    Any new conditions: No    Any new allergies: No    Any new side effects: No    Any new diagnoses: No    How many doses remainin    Did patient want to speak with pharmacist: No  Delivery:      Delivery date and method:  via     Needs by Date:     Signature required: No    Any additional details for : None   Teach Appointment Date: 22  Shipping Address: [address] [place] [address] [place] [address]  Medication (name, strength and dose): Emgality 120mg/mL  Copay: $0/30ds  Payment Method: N/A, $0 copay   Supplies: None  Additional Information: Next call date: .

## 2023-11-01 ENCOUNTER — PHARMACY VISIT (OUTPATIENT)
Dept: PHARMACY | Facility: MEDICAL CENTER | Age: 48
End: 2023-11-01
Payer: COMMERCIAL

## 2023-11-08 ENCOUNTER — HOSPITAL ENCOUNTER (OUTPATIENT)
Facility: MEDICAL CENTER | Age: 48
End: 2023-11-08
Payer: COMMERCIAL

## 2023-11-08 ENCOUNTER — OFFICE VISIT (OUTPATIENT)
Dept: MEDICAL GROUP | Facility: IMAGING CENTER | Age: 48
End: 2023-11-08
Payer: COMMERCIAL

## 2023-11-08 VITALS
HEIGHT: 64 IN | SYSTOLIC BLOOD PRESSURE: 110 MMHG | BODY MASS INDEX: 24.41 KG/M2 | WEIGHT: 143 LBS | HEART RATE: 103 BPM | TEMPERATURE: 98 F | OXYGEN SATURATION: 100 % | DIASTOLIC BLOOD PRESSURE: 60 MMHG | RESPIRATION RATE: 16 BRPM

## 2023-11-08 DIAGNOSIS — R82.998 LEUKOCYTES IN URINE: ICD-10-CM

## 2023-11-08 DIAGNOSIS — N30.01 ACUTE CYSTITIS WITH HEMATURIA: ICD-10-CM

## 2023-11-08 DIAGNOSIS — R19.03 RIGHT LOWER QUADRANT ABDOMINAL SWELLING, MASS AND LUMP: ICD-10-CM

## 2023-11-08 DIAGNOSIS — L20.9 ATOPIC DERMATITIS, UNSPECIFIED TYPE: ICD-10-CM

## 2023-11-08 DIAGNOSIS — R30.0 BURNING WITH URINATION: ICD-10-CM

## 2023-11-08 LAB
APPEARANCE UR: CLEAR
BILIRUB UR STRIP-MCNC: NEGATIVE MG/DL
COLOR UR AUTO: YELLOW
GLUCOSE UR STRIP.AUTO-MCNC: NEGATIVE MG/DL
KETONES UR STRIP.AUTO-MCNC: NORMAL MG/DL
LEUKOCYTE ESTERASE UR QL STRIP.AUTO: NORMAL
NITRITE UR QL STRIP.AUTO: NEGATIVE
PH UR STRIP.AUTO: 7 [PH] (ref 5–8)
PROT UR QL STRIP: NEGATIVE MG/DL
RBC UR QL AUTO: NORMAL
SP GR UR STRIP.AUTO: 1.01
UROBILINOGEN UR STRIP-MCNC: 0.2 MG/DL

## 2023-11-08 PROCEDURE — 87086 URINE CULTURE/COLONY COUNT: CPT

## 2023-11-08 PROCEDURE — 87186 SC STD MICRODIL/AGAR DIL: CPT

## 2023-11-08 PROCEDURE — 81002 URINALYSIS NONAUTO W/O SCOPE: CPT

## 2023-11-08 PROCEDURE — 87077 CULTURE AEROBIC IDENTIFY: CPT

## 2023-11-08 PROCEDURE — 3074F SYST BP LT 130 MM HG: CPT

## 2023-11-08 PROCEDURE — 1126F AMNT PAIN NOTED NONE PRSNT: CPT

## 2023-11-08 PROCEDURE — 3078F DIAST BP <80 MM HG: CPT

## 2023-11-08 PROCEDURE — 99214 OFFICE O/P EST MOD 30 MIN: CPT

## 2023-11-08 RX ORDER — TRIAMCINOLONE ACETONIDE 1 MG/G
1 CREAM TOPICAL 2 TIMES DAILY
Qty: 30 G | Refills: 3 | Status: SHIPPED | OUTPATIENT
Start: 2023-11-08

## 2023-11-08 RX ORDER — CEFDINIR 300 MG/1
300 CAPSULE ORAL 2 TIMES DAILY
Qty: 10 CAPSULE | Refills: 0 | Status: SHIPPED | OUTPATIENT
Start: 2023-11-08 | End: 2023-11-13

## 2023-11-08 ASSESSMENT — PAIN SCALES - GENERAL: PAINLEVEL: NO PAIN

## 2023-11-08 NOTE — PROGRESS NOTES
Subjective:     CC:   Chief Complaint   Patient presents with    UTI     Burning with blood, x2weeks     Rash     Right hand, x2weeks     Bump     Right side (rib), noticed since losing weight        HPI:   Earnestine presents today to discuss:    UTI  Patient reports developing symptoms of dysuria 1.5 weeks ago. She has noticed some blood when wiping, occasionally. She reports polyuria secondary to her diabetes insipidus.   Patient has had UTI in the past with similar presenting symptoms.   She is drinking more water; but no OTC medication taken.  No fevers, n/v, or flank pain.    Rash  Patient reports developing red rash on her right hand 2 weeks ago. Reports that it is red and dry, not itchy.   She denies contact to new chemicals or products but she does use hand sanitizers regularly.   No fevers, hand edema, drainage.     Right rib lump  Patient noticed a large soft lump on her right upper rib and abdomen area a month ago. It is not painful.   She has had intentional weight loss and admits to intense exercise and weight lifting.   BMs are regular and soft.  She denies fevers, nausea, vomiting, abdominal pain, dyspepsia, hematochezia, melena, or bowel changes.  She denies swelling, redness, warmth, drainage to affected areas.  Denies alcohol intake or excessive Tylenol/NSAID use      Past Medical History:   Diagnosis Date    Anxiety     Asthma     inhalers    Bipolar 2 disorder ([facility])     depression , anxiety    Bipolar disorder ([facility]) 11/11/2020    Cancer ([facility]) 1996    cervical    Depression     Hypothyroidism 11/11/2020    Migraine     Nephrolithiasis 11/11/2020    Pain     back    Seizure ([facility]) 2013    takes po meds    Stroke ([facility]) 2013    TIA    Urinary incontinence 11/11/2020     Family History   Problem Relation Age of Onset    Kidney stones Brother     Stroke Mother     Diabetes Father     Hypertension Father     Hyperlipidemia Father     Kidney Disease Neg Hx      Past Surgical History:   Procedure Laterality  Date    MD CYSTOSCOPY,INSERT URETERAL STENT Right 12/09/2020    Procedure: CYSTOSCOPY, WITH URETERAL STENT INSERTION;  Surgeon: Dorian Estrada M.D.;  Location: SURGERY [facility];  Service: Urology    MD CYSTO/URETERO/PYELOSCOPY, DX Right 12/09/2020    Procedure: URETEROSCOPY;  Surgeon: Dorian Estrada M.D.;  Location: SURGERY [facility];  Service: Urology    CHOLECYSTECTOMY  2009    ABDOMINAL HYSTERECTOMY TOTAL  2002    EXPLORATORY LAPAROTOMY      HAND SURGERY      3 x right hand, 1x left    LITHOTRIPSY Right 2014 and 2015    kidney stone    LYSIS ADHESIONS GENERAL      OOPHORECTOMY  2003, 2004,    ovarian cysct removal     PRIMARY C SECTION       Social History     Tobacco Use    Smoking status: Former     Current packs/day: 2.00     Average packs/day: 2.0 packs/day for 13.0 years (26.0 ttl pk-yrs)     Types: Cigarettes    Smokeless tobacco: Never   Vaping Use    Vaping Use: Never used   Substance Use Topics    Alcohol use: Not Currently     Comment: Used to drink, stopped comlpetely 2010's    Drug use: Yes     Types: Marijuana, Inhaled     Comment: Mirella      Social History     Social History Narrative    Works as      Current Outpatient Medications Ordered in Epic   Medication Sig Dispense Refill    triamcinolone acetonide (KENALOG) 0.1 % Cream Apply 1 Application topically 2 times a day. 30 g 3    cefdinir (OMNICEF) 300 MG Cap Take 1 Capsule by mouth 2 times a day for 5 days. 10 Capsule 0    liothyronine (CYTOMEL) 5 MCG Tab Take 5 mcg by mouth every day. Take 1 tablet by mouth every day on an empty stomach.      escitalopram (LEXAPRO) 10 MG Tab Take 1 Tablet by mouth every morning. 90 Tablet 1    eszopiclone (LUNESTA) 1 MG Tab tablet Take 1 Tablet by mouth at bedtime as needed for Insomnia for up to 30 days. 30 Tablet 0    VRAYLAR 3 MG capsule Take 1 Capsule by mouth every evening. 30 Capsule 3    [START ON 12/4/2023] eszopiclone (LUNESTA) 1 MG Tab tablet Take 1 Tablet by mouth  "at bedtime as needed for Insomnia for up to 30 days. 30 Tablet 0    [START ON 1/3/2024] eszopiclone (LUNESTA) 1 MG Tab tablet Take 1 Tablet by mouth at bedtime as needed for Insomnia for up to 30 days. 30 Tablet 0    Potassium Citrate 15 MEQ (1620 MG) Tab CR TAKE 2 TABLETS BY MOUTH TWICE A  Tablet 3    phentermine (ADIPEX-P) 37.5 MG tablet Take 37.5 mg by mouth every day.      ULTICARE TUBERCULIN SAFETY SYR 25G X 5/8\" 1 ML Misc AS DIRECTED FOR INTRAMUSCULAR TESTOSTERONE INJECTION EVERY 14 DAYS 90 DAYS      Galcanezumab-gnlm (EMGALITY) 120 MG/ML Solution Prefilled Syringe Inject 120 mg under the skin every 30 DAYS. 1 mL 5    lamotrigine (LAMICTAL) 200 MG tablet TAKE 1 TABLET BY MOUTH EVERY MORNING THEN TAKE 1 TABLET BY MOUTH IN THE EVENING 60 Tablet 5    hydroCHLOROthiazide (HYDRODIURIL) 25 MG Tab TAKE 1 TABLET BY MOUTH TWICE A  Tablet 3    testosterone cypionate (DEPO-TESTOSTERONE) 200 MG/ML Solution injection Inject 0.25 mL into the shoulder, thigh, or buttocks every 7 days.      oxyCODONE-Acetaminophen (PERCOCET PO) Take  by mouth.      dicyclomine (BENTYL) 20 MG Tab 1 Tablet.      levothyroxine (SYNTHROID) 75 MCG Tab 1 Tablet.      ondansetron (ZOFRAN ODT) 4 MG TABLET DISPERSIBLE 1 Tablet.      albuterol 108 (90 Base) MCG/ACT Aero Soln inhalation aerosol 90 Puffs. (Patient not taking: Reported on 11/8/2023)       No current [facility]-ordered facility-administered medications on file.     Promethazine  ROS: see hpi  Gen: no fevers/chills  Pulm: no sob, no cough  CV: no chest pain, no palpitations, no edema  GI: no nausea/vomiting, no diarrhea  Skin: no rash    Objective:   Exam:  /60 (BP Location: Left arm, Patient Position: Sitting, BP Cuff Size: Adult)   Pulse (!) 103   Temp 36.7 °C (98 °F) (Temporal)   Resp 16   Ht 1.626 m (5' 4\")   Wt 64.9 kg (143 lb)   SpO2 100%   BMI 24.55 kg/m²    Body mass index is 24.55 kg/m².    Gen: Alert and oriented, No apparent distress.  HEENT: Head atraumatic, " normocephalic. Pupils equal and round.  Neck: Neck is supple without lymphadenopathy.   Lungs: Normal effort, CTA bilaterally, no wheezes, rhonchi, or rales  CV: Regular rate and rhythm. No murmurs, rubs, or gallops.  ABD: +BS. Non-tender, non-distended. No rebound, rigidity, or guarding.  Physical Exam  Abdominal:          Comments: 2 in x 4 in area of fullness, soft, mobile. No erythema, edema, or warmth to touch.           Ext: No clubbing, cyanosis, edema.  Skin: annular erythematous dry,scaly plaque on right hand.   Assessment & Plan:     48 y.o. female with the following -     1. Burning with urination  2. Acute cystitis with hematuria  3. Leukocytes in urine   New condition.  Urinalysis positive with leukocytes and RBC.  urine culture sent to lab.  Patient presentation consistent with acute cystitis. No s/s of systemic symptoms or pyelonephritis.   Will treat empirically with antibiotics.  Patient does have CKD.  Will treat with cefdinir.  Medication administration and side effects discussed.  Instructed patient to increase fluid intake.  If symptoms do not improve, advised to follow-up in office.  ED symptoms discussed.    - POCT Urinalysis  - URINE CULTURE(NEW); Future  - cefdinir (OMNICEF) 300 MG Cap; Take 1 Capsule by mouth 2 times a day for 5 days.  Dispense: 10 Capsule; Refill: 0    4. Atopic dermatitis, unspecified type  Patient presentation consistent with atopic dermatitis. Will treat with triamcinolone cream.  Medication administration and side effects discussed.  Do not use over 14 days to prevent skin atrophy.  Instructed parent to use moisturizer/thick emollient (Cetaphhil, Aquaphor, Eucerin, Aveeno, etc.) TOP BID to all affected areas. Make sure to apply emollient immediately after bathing.   May use OTC anti-histamine such as Benadryl for itching. RTC for worsening skin breakdown, any purulent drainage, increased pain/discomfort, a fever >101.5, or for any other concerns.     - triamcinolone  acetonide (KENALOG) 0.1 % Cream; Apply 1 Application topically 2 times a day.  Dispense: 30 g; Refill: 3    5. Right lower quadrant abdominal swelling, mass and lump  New, stable condition.  No s/s of acute abdomen to warrant urgency or ED visit.  Etiology unknown.  Likely lipoma versus hernia.  Will order ultrasound to evaluate.  ED symptoms discussed.    - US-RUQ; Future    Medical Decision Making/Course:  In the course of preparing for this visit with review of the pertinent past medical history, recent and past clinic visits, current medications, and performing chart, immunization, medical history and medication reconciliation, and in the further course of obtaining the current history pertinent to the clinic visit today, performing an exam and evaluation, ordering and independently evaluating labs, tests, and/or procedures, prescribing any recommended new medications as noted above, providing any pertinent counseling and education and recommending further coordination of care. This was discussed with patient in a shared-decision making conversation, and they understand and agreed with plan of care.     Return if symptoms worsen or fail to improve.    GLENN Whiting.   [facility]    Please note that this dictation was created using voice recognition software. I have made every reasonable attempt to correct obvious errors, but I expect that there are errors of grammar and possibly content that I did not discover before finalizing the note.

## 2023-11-10 ENCOUNTER — OFFICE VISIT (OUTPATIENT)
Dept: NEPHROLOGY | Facility: MEDICAL CENTER | Age: 48
End: 2023-11-10
Payer: COMMERCIAL

## 2023-11-10 VITALS
HEIGHT: 64 IN | TEMPERATURE: 97.2 F | OXYGEN SATURATION: 98 % | HEART RATE: 94 BPM | BODY MASS INDEX: 24.59 KG/M2 | WEIGHT: 144 LBS | DIASTOLIC BLOOD PRESSURE: 68 MMHG | SYSTOLIC BLOOD PRESSURE: 106 MMHG

## 2023-11-10 DIAGNOSIS — F31.81 BIPOLAR 2 DISORDER (HCC): ICD-10-CM

## 2023-11-10 DIAGNOSIS — N18.2 CKD (CHRONIC KIDNEY DISEASE) STAGE 2, GFR 60-89 ML/MIN: ICD-10-CM

## 2023-11-10 DIAGNOSIS — N20.0 NEPHROLITHIASIS: Chronic | ICD-10-CM

## 2023-11-10 DIAGNOSIS — N25.1 DIABETES INSIPIDUS, NEPHROGENIC (HCC): Chronic | ICD-10-CM

## 2023-11-10 LAB
BACTERIA UR CULT: ABNORMAL
BACTERIA UR CULT: ABNORMAL
SIGNIFICANT IND 70042: ABNORMAL
SITE SITE: ABNORMAL
SOURCE SOURCE: ABNORMAL

## 2023-11-10 PROCEDURE — 3074F SYST BP LT 130 MM HG: CPT | Performed by: INTERNAL MEDICINE

## 2023-11-10 PROCEDURE — 99214 OFFICE O/P EST MOD 30 MIN: CPT | Performed by: INTERNAL MEDICINE

## 2023-11-10 PROCEDURE — 3078F DIAST BP <80 MM HG: CPT | Performed by: INTERNAL MEDICINE

## 2023-11-10 RX ORDER — CYCLOBENZAPRINE HCL 10 MG
10 TABLET ORAL PRN
COMMUNITY
End: 2024-02-23

## 2023-11-10 ASSESSMENT — ENCOUNTER SYMPTOMS
FEVER: 0
SHORTNESS OF BREATH: 0
ABDOMINAL PAIN: 0

## 2023-11-11 NOTE — PROGRESS NOTES
Chief Complaint   Patient presents with    Chronic Kidney Disease         HPI:  Earnestine Lindsay is a 48 y.o. female with a history of CKD stage II, nephrolithiasis, bipolar II disorder previously on lithium complicated by diabetes insipidus who presents today for follow up.    Re: nephrolithiasis, She has had more kidney stones than she can count. Her first kidney stone episode was mid-20's. She has had stones multiple times a year since her 20's. She has never had sestamibi scan.  Stones have been so severe in the past that she has needed laser lithotripsy. 24-h test results as below. She remains on K-citrate 30 mEq PO BID. Denies recent kidney stones, but has UTI right now.     Re: Bipolar disorder, she has been on lithium therapy since late 20's. Kidney stones came before lithium therapy. She has seen a psychiatrist and is trying to get off lithium, got off lithium in 3/2022. She remains on Vraylar (Cariprazine) and Lexapro. She is still following with psychiatry. She is still working as an .     Re: Diabetes insipidus. She is no longer on lithium and started on HCTZ 25mg BID in 3/2022. She still has ~5L UOP. Sleeping ok at night, still has 1-2x nocturia. She still has thirst, but not as insatiable as it used to be.           Past Medical History:   Diagnosis Date    Anxiety     Asthma     inhalers    Bipolar 2 disorder (HCC)     depression , anxiety    Bipolar disorder (HCC) 11/11/2020    Cancer ([facility]) 1996    cervical    Depression     Hypothyroidism 11/11/2020    Migraine     Nephrolithiasis 11/11/2020    Pain     back    Seizure (HCC) 2013    takes po meds    Stroke (HCC) 2013    TIA    Urinary incontinence 11/11/2020       Past Surgical History:   Procedure Laterality Date    OR CYSTOSCOPY,INSERT URETERAL STENT Right 12/09/2020    Procedure: CYSTOSCOPY, WITH URETERAL STENT INSERTION;  Surgeon: Dorian Estrada M.D.;  Location: SURGERY [facility];  Service: Urology    OR  "CYSTO/URETERO/PYELOSCOPY, DX Right 12/09/2020    Procedure: URETEROSCOPY;  Surgeon: Dorian Estrada M.D.;  Location: SURGERY [facility];  Service: Urology    CHOLECYSTECTOMY  2009    ABDOMINAL HYSTERECTOMY TOTAL  2002    EXPLORATORY LAPAROTOMY      HAND SURGERY      3 x right hand, 1x left    LITHOTRIPSY Right 2014 and 2015    kidney stone    LYSIS ADHESIONS GENERAL      OOPHORECTOMY  2003, 2004,    ovarian cysct removal     PRIMARY C SECTION          Outpatient Encounter Medications as of 11/10/2023   Medication Sig Dispense Refill    cyclobenzaprine (FLEXERIL) 10 mg Tab Take 10 mg by mouth as needed.      triamcinolone acetonide (KENALOG) 0.1 % Cream Apply 1 Application topically 2 times a day. 30 g 3    cefdinir (OMNICEF) 300 MG Cap Take 1 Capsule by mouth 2 times a day for 5 days. 10 Capsule 0    liothyronine (CYTOMEL) 5 MCG Tab Take 5 mcg by mouth every day. Take 1 tablet by mouth every day on an empty stomach.      escitalopram (LEXAPRO) 10 MG Tab Take 1 Tablet by mouth every morning. 90 Tablet 1    eszopiclone (LUNESTA) 1 MG Tab tablet Take 1 Tablet by mouth at bedtime as needed for Insomnia for up to 30 days. 30 Tablet 0    VRAYLAR 3 MG capsule Take 1 Capsule by mouth every evening. 30 Capsule 3    [START ON 12/4/2023] eszopiclone (LUNESTA) 1 MG Tab tablet Take 1 Tablet by mouth at bedtime as needed for Insomnia for up to 30 days. 30 Tablet 0    [START ON 1/3/2024] eszopiclone (LUNESTA) 1 MG Tab tablet Take 1 Tablet by mouth at bedtime as needed for Insomnia for up to 30 days. 30 Tablet 0    Potassium Citrate 15 MEQ (1620 MG) Tab CR TAKE 2 TABLETS BY MOUTH TWICE A  Tablet 3    phentermine (ADIPEX-P) 37.5 MG tablet Take 37.5 mg by mouth every day.      ULTICARE TUBERCULIN SAFETY SYR 25G X 5/8\" 1 ML Misc AS DIRECTED FOR INTRAMUSCULAR TESTOSTERONE INJECTION EVERY 14 DAYS 90 DAYS      Galcanezumab-gnlm (EMGALITY) 120 MG/ML Solution Prefilled Syringe Inject 120 mg under the skin every 30 DAYS. 1 mL 5 " "   lamotrigine (LAMICTAL) 200 MG tablet TAKE 1 TABLET BY MOUTH EVERY MORNING THEN TAKE 1 TABLET BY MOUTH IN THE EVENING 60 Tablet 5    hydroCHLOROthiazide (HYDRODIURIL) 25 MG Tab TAKE 1 TABLET BY MOUTH TWICE A  Tablet 3    testosterone cypionate (DEPO-TESTOSTERONE) 200 MG/ML Solution injection Inject 0.25 mL into the shoulder, thigh, or buttocks every 7 days.      oxyCODONE-Acetaminophen (PERCOCET PO) Take  by mouth.      albuterol 108 (90 Base) MCG/ACT Aero Soln inhalation aerosol 90 Puffs.      dicyclomine (BENTYL) 20 MG Tab 1 Tablet.      levothyroxine (SYNTHROID) 75 MCG Tab 1 Tablet.      ondansetron (ZOFRAN ODT) 4 MG TABLET DISPERSIBLE 1 Tablet.       No facility-administered encounter medications on file as of 11/10/2023.        Allergies   Allergen Reactions    Promethazine Hives           Family History   Problem Relation Age of Onset    Kidney stones Brother     Stroke Mother     Diabetes Father     Hypertension Father     Hyperlipidemia Father     Kidney Disease Neg Hx        Review of Systems   Constitutional:  Negative for fever.   Respiratory:  Negative for shortness of breath.    Cardiovascular:  Negative for chest pain.   Gastrointestinal:  Negative for abdominal pain.   Genitourinary:  Positive for dysuria and hematuria.   All other systems reviewed and are negative.      /68 (BP Location: Right arm, Patient Position: Sitting, BP Cuff Size: Adult)   Pulse 94   Temp 36.2 °C (97.2 °F) (Temporal)   Ht 1.626 m (5' 4\")   Wt 65.3 kg (144 lb)   SpO2 98%   BMI 24.72 kg/m²     Physical Exam  Constitutional:       General: She is not in acute distress.  HENT:      Mouth/Throat:      Pharynx: No oropharyngeal exudate.   Eyes:      General: No scleral icterus.  Neck:      Trachea: No tracheal deviation.   Cardiovascular:      Rate and Rhythm: Normal rate and regular rhythm.      Heart sounds: Normal heart sounds. No murmur heard.  Pulmonary:      Effort: Pulmonary effort is normal.      Breath " sounds: Normal breath sounds. No stridor. No rales.   Abdominal:      General: Bowel sounds are normal.      Palpations: Abdomen is soft.      Tenderness: There is no abdominal tenderness.   Musculoskeletal:         General: Normal range of motion.      Cervical back: Neck supple.      Right lower leg: No edema.      Left lower leg: No edema.   Skin:     General: Skin is warm and dry.      Findings: No rash.   Neurological:      General: No focal deficit present.      Mental Status: She is alert and oriented to person, place, and time.   Psychiatric:         Mood and Affect: Mood and affect normal.         Behavior: Behavior normal.         Labs reviewed.    Recent Labs     02/14/23  0723 10/20/23  1625   ALBUMIN  --  4.4   HDL 47  --    TRIGLYCERIDE 84  --    SODIUM  --  139   POTASSIUM  --  3.5*   CHLORIDE  --  100   CO2  --  25   BUN  --  16   CREATININE  --  1.08   PHOSPHORUS  --  3.5       Lab Results   Component Value Date/Time    WBC 5.7 10/20/2023 04:25 PM    RBC 4.77 10/20/2023 04:25 PM    HEMOGLOBIN 15.7 10/20/2023 04:25 PM    HEMATOCRIT 46.3 10/20/2023 04:25 PM    MCV 97.1 10/20/2023 04:25 PM    MCH 32.9 10/20/2023 04:25 PM    MCHC 33.9 10/20/2023 04:25 PM    MPV 9.6 10/20/2023 04:25 PM             URINALYSIS:  Lab Results   Component Value Date/Time    COLORURINE Yellow 10/20/2023 1625    CLARITY Clear 10/20/2023 1625    SPECGRAVITY 1.010 10/20/2023 1625    PHURINE 6.90 10/24/2023 0630    KETONES Negative 10/20/2023 1625    PROTEINURIN Negative 10/20/2023 1625    BILIRUBINUR Negative 10/20/2023 1625    NITRITE Negative 10/20/2023 1625    LEUKESTERAS Small (A) 10/20/2023 1625    OCCULTBLOOD Negative 10/20/2023 1625       UPC  Lab Results   Component Value Date/Time    TOTPROTUR 7.0 10/20/2023 1625      Lab Results   Component Value Date/Time    CREATININEU 2254 (H) 10/24/2023 0630               Imaging reviewed  No orders to display     24-hour collection in 10/2023 (on HCTZ) with 4.9 L, 64 mg/day of  calcium, 764 mg/day of citric acid, 59 mg/day oxalate (high)    24-hour collection in 11/2022 (on HCTZ) with 5.1 L, 189 mg/day of calcium, 724 mg/day of citric acid, which is in the normal range    24-hour urine in April 2022 (on hydrochlorothiazide) with 4.5 L, 171 mg/day calcium, low citric acid    24-hour urine collection in December 2021 with 7000 mL of urine, electrolyte levels pending    24-hour urine collection April 2021 with 5300 mL, high urine oxalate, low average urine citric acid, 186 mg/day calcium    Repeat 24-hour collection in July 2020 with 6400 mL, urine electrolytes remain pending.      Assessment:  Earnestine Lindsay is a 48 y.o. female with a history of CKD stage II, nephrolithiasis, bipolar II disorder previously on lithium complicated by diabetes insipidus who presents today for follow up.    Plan:  1. Nephrolithiasis  -Noted historically.  Most likely due to calcium oxalate stones given 24-hour urine results.  I recommend maintain low oxalate diet.  Continue potassium citrate 30 mEq p.o. twice daily.  Continue hydrochlorothiazide 25 mg p.o. twice daily to reduce urinary calcium.  Hydrochlorothiazide could be increased to 50 mg p.o. twice daily if needed to further reduce urinary calcium in the future.      2. CKD (chronic kidney disease) stage 2-3a  -Stable.  CKD likely from lithium exposure/interstitial nephritis.  Patient remains off  of lithium as of March 2022.  I recommend avoid NSAIDs and other nephrotoxins.  I recommend a low-sodium diet.    3.  Diabetes insipidus.  -Persistent, likely from previous history of lithium use.  She remains off of lithium as of March 2022, and off of amiloride.  Continue hydrochlorothiazide 25 mg p.o. twice daily, which has reduced her urine output from 6 or 7 L down to 5 L/day.    4.  Bipolar disorder  -Stable per patient.  Patient remains off of lithium as of March 2022.  Continue to follow with psychiatry.    5.  Hypervitaminosis D  -Noted historically.   Recommend maintain over-the-counter maintenance vitamin D 5000 units Monday Wednesday Friday.    Return to clinic in 12 months with preclinic labs    Trung Schwab MD  Nephrology

## 2023-11-20 ENCOUNTER — OFFICE VISIT (OUTPATIENT)
Dept: MEDICAL GROUP | Facility: IMAGING CENTER | Age: 48
End: 2023-11-20
Payer: COMMERCIAL

## 2023-11-20 VITALS
OXYGEN SATURATION: 98 % | RESPIRATION RATE: 16 BRPM | SYSTOLIC BLOOD PRESSURE: 104 MMHG | TEMPERATURE: 98 F | HEIGHT: 64 IN | WEIGHT: 143.2 LBS | BODY MASS INDEX: 24.45 KG/M2 | DIASTOLIC BLOOD PRESSURE: 70 MMHG | HEART RATE: 89 BPM

## 2023-11-20 DIAGNOSIS — N30.00 ACUTE CYSTITIS WITHOUT HEMATURIA: ICD-10-CM

## 2023-11-20 LAB
APPEARANCE UR: NORMAL
BILIRUB UR STRIP-MCNC: NORMAL MG/DL
COLOR UR AUTO: NORMAL
GLUCOSE UR STRIP.AUTO-MCNC: NORMAL MG/DL
KETONES UR STRIP.AUTO-MCNC: NORMAL MG/DL
LEUKOCYTE ESTERASE UR QL STRIP.AUTO: NORMAL
NITRITE UR QL STRIP.AUTO: NORMAL
PH UR STRIP.AUTO: 7.5 [PH] (ref 5–8)
PROT UR QL STRIP: NORMAL MG/DL
RBC UR QL AUTO: NORMAL
SP GR UR STRIP.AUTO: 1.01
UROBILINOGEN UR STRIP-MCNC: 0.2 MG/DL

## 2023-11-20 PROCEDURE — 81002 URINALYSIS NONAUTO W/O SCOPE: CPT

## 2023-11-20 PROCEDURE — 99213 OFFICE O/P EST LOW 20 MIN: CPT

## 2023-11-20 PROCEDURE — 3074F SYST BP LT 130 MM HG: CPT

## 2023-11-20 PROCEDURE — 3078F DIAST BP <80 MM HG: CPT

## 2023-11-20 RX ORDER — SULFAMETHOXAZOLE AND TRIMETHOPRIM 800; 160 MG/1; MG/1
1 TABLET ORAL 2 TIMES DAILY
Qty: 14 TABLET | Refills: 0 | Status: SHIPPED
Start: 2023-11-20 | End: 2023-11-27

## 2023-11-20 RX ORDER — SULFAMETHOXAZOLE AND TRIMETHOPRIM 800; 160 MG/1; MG/1
1 TABLET ORAL 2 TIMES DAILY
Qty: 14 TABLET | Refills: 0 | Status: SHIPPED | OUTPATIENT
Start: 2023-11-20 | End: 2023-11-20 | Stop reason: SDUPTHER

## 2023-11-20 NOTE — PROGRESS NOTES
Subjective:     CC:   Chief Complaint   Patient presents with    UTI     Burning and urgency x 2 weeks         HPI:   Earnestine presents today to discuss:    UTI  Patient completed cefdinir for her UTI diagnosed week ago.  UTI was confirmed by urine culture.  Her symptoms did improve; however, her symptoms reappeared after completing cefdinir.  She continues to have dysuria and urinary urgency.  She denies fevers, nausea, vomiting, flank pain.    Past Medical History:   Diagnosis Date    Anxiety     Asthma     inhalers    Bipolar 2 disorder ([facility])     depression , anxiety    Bipolar disorder ([facility]) 11/11/2020    Cancer ([facility]) 1996    cervical    Depression     Hypothyroidism 11/11/2020    Migraine     Nephrolithiasis 11/11/2020    Pain     back    Seizure ([facility]) 2013    takes po meds    Stroke ([facility]) 2013    TIA    Urinary incontinence 11/11/2020     Family History   Problem Relation Age of Onset    Kidney stones Brother     Stroke Mother     Diabetes Father     Hypertension Father     Hyperlipidemia Father     Kidney Disease Neg Hx      Past Surgical History:   Procedure Laterality Date    HI CYSTOSCOPY,INSERT URETERAL STENT Right 12/09/2020    Procedure: CYSTOSCOPY, WITH URETERAL STENT INSERTION;  Surgeon: Dorian Estrada M.D.;  Location: SURGERY [facility];  Service: Urology    HI CYSTO/URETERO/PYELOSCOPY, DX Right 12/09/2020    Procedure: URETEROSCOPY;  Surgeon: Dorian Estrada M.D.;  Location: SURGERY [facility];  Service: Urology    CHOLECYSTECTOMY  2009    ABDOMINAL HYSTERECTOMY TOTAL  2002    EXPLORATORY LAPAROTOMY      HAND SURGERY      3 x right hand, 1x left    LITHOTRIPSY Right 2014 and 2015    kidney stone    LYSIS ADHESIONS GENERAL      OOPHORECTOMY  2003, 2004,    ovarian cysct removal     PRIMARY C SECTION       Social History     Tobacco Use    Smoking status: Former     Current packs/day: 2.00     Average packs/day: 2.0 packs/day for 13.0 years (26.0 ttl pk-yrs)     Types: Cigarettes     Smokeless tobacco: Never   Vaping Use    Vaping Use: Never used   Substance Use Topics    Alcohol use: Not Currently     Comment: Used to drink, stopped comlpetely 2010's    Drug use: Yes     Types: Marijuana, Inhaled     Comment: Aliciaharika      Social History     Social History Narrative    Works as      Current Outpatient Medications Ordered in Epic   Medication Sig Dispense Refill    sulfamethoxazole-trimethoprim (BACTRIM DS) 800-160 MG tablet Take 1 Tablet by mouth 2 times a day for 7 days. 14 Tablet 0    cyclobenzaprine (FLEXERIL) 10 mg Tab Take 10 mg by mouth as needed.      triamcinolone acetonide (KENALOG) 0.1 % Cream Apply 1 Application topically 2 times a day. 30 g 3    liothyronine (CYTOMEL) 5 MCG Tab Take 5 mcg by mouth every day. Take 1 tablet by mouth every day on an empty stomach.      escitalopram (LEXAPRO) 10 MG Tab Take 1 Tablet by mouth every morning. 90 Tablet 1    eszopiclone (LUNESTA) 1 MG Tab tablet Take 1 Tablet by mouth at bedtime as needed for Insomnia for up to 30 days. 30 Tablet 0    VRAYLAR 3 MG capsule Take 1 Capsule by mouth every evening. 30 Capsule 3    Potassium Citrate 15 MEQ (1620 MG) Tab CR TAKE 2 TABLETS BY MOUTH TWICE A  Tablet 3    phentermine (ADIPEX-P) 37.5 MG tablet Take 37.5 mg by mouth every day.      Galcanezumab-gnlm (EMGALITY) 120 MG/ML Solution Prefilled Syringe Inject 120 mg under the skin every 30 DAYS. 1 mL 5    lamotrigine (LAMICTAL) 200 MG tablet TAKE 1 TABLET BY MOUTH EVERY MORNING THEN TAKE 1 TABLET BY MOUTH IN THE EVENING 60 Tablet 5    hydroCHLOROthiazide (HYDRODIURIL) 25 MG Tab TAKE 1 TABLET BY MOUTH TWICE A  Tablet 3    testosterone cypionate (DEPO-TESTOSTERONE) 200 MG/ML Solution injection Inject 0.25 mL into the shoulder, thigh, or buttocks every 7 days.      oxyCODONE-Acetaminophen (PERCOCET PO) Take  by mouth.      albuterol 108 (90 Base) MCG/ACT Aero Soln inhalation aerosol 90 Puffs.      dicyclomine (BENTYL) 20 MG Tab 1  "Tablet.      levothyroxine (SYNTHROID) 75 MCG Tab 1 Tablet.      ondansetron (ZOFRAN ODT) 4 MG TABLET DISPERSIBLE 1 Tablet.      [START ON 12/4/2023] eszopiclone (LUNESTA) 1 MG Tab tablet Take 1 Tablet by mouth at bedtime as needed for Insomnia for up to 30 days. 30 Tablet 0    [START ON 1/3/2024] eszopiclone (LUNESTA) 1 MG Tab tablet Take 1 Tablet by mouth at bedtime as needed for Insomnia for up to 30 days. 30 Tablet 0    ULTICARE TUBERCULIN SAFETY SYR 25G X 5/8\" 1 ML Misc AS DIRECTED FOR INTRAMUSCULAR TESTOSTERONE INJECTION EVERY 14 DAYS 90 DAYS (Patient not taking: Reported on 11/20/2023)       No current [facility]-ordered facility-administered medications on file.     Promethazine    ROS: see hpi  Gen: no fevers/chills  Pulm: no sob, no cough  CV: no chest pain, no palpitations, no edema  GI: no nausea/vomiting, no diarrhea  Skin: no rash    Objective:   Exam:  /70 (BP Location: Left arm, Patient Position: Sitting, BP Cuff Size: Adult)   Pulse 89   Temp 36.7 °C (98 °F) (Temporal)   Resp 16   Ht 1.626 m (5' 4\")   Wt 65 kg (143 lb 3.2 oz)   SpO2 98%   BMI 24.58 kg/m²    Body mass index is 24.58 kg/m².    Gen: Alert and oriented, No apparent distress.  HEENT: Head atraumatic, normocephalic. Pupils equal and round.  Neck: Neck is supple without lymphadenopathy.   Lungs: Normal effort, CTA bilaterally, no wheezes, rhonchi, or rales  CV: Regular rate and rhythm. No murmurs, rubs, or gallops.  ABD: +BS. Non-tender, non-distended. No rebound, rigidity, or guarding.  Ext: No clubbing, cyanosis, edema.    Assessment & Plan:     48 y.o. female with the following -     1. Acute cystitis without hematuria  Established and unresolved condition.  Urinalysis is positive for leukocytes.  Urine culture confirms E. coli with sensitivity to Bactrim.  Will treat with Bactrim.  Medication administration and side effects discussed.  Instructed patient to increase fluid intake to prevent dehydration.  Discussed signs and " symptoms of kidney damage and pyelonephritis and to follow-up in office or go to ED if these occurs.    - sulfamethoxazole-trimethoprim (BACTRIM DS) 800-160 MG tablet; Take 1 Tablet by mouth 2 times a day for 7 days.  Dispense: 14 Tablet; Refill: 0  - POCT Urinalysis    Medical Decision Making/Course:  In the course of preparing for this visit with review of the pertinent past medical history, recent and past clinic visits, current medications, and performing chart, immunization, medical history and medication reconciliation, and in the further course of obtaining the current history pertinent to the clinic visit today, performing an exam and evaluation, ordering and independently evaluating labs, tests, and/or procedures, prescribing any recommended new medications as noted above, providing any pertinent counseling and education and recommending further coordination of care. This was discussed with patient in a shared-decision making conversation, and they understand and agreed with plan of care.     Return if symptoms worsen or fail to improve.    GLENN Whiting.   [facility]    Please note that this dictation was created using voice recognition software. I have made every reasonable attempt to correct obvious errors, but I expect that there are errors of grammar and possibly content that I did not discover before finalizing the note.

## 2023-11-24 PROCEDURE — RXMED WILLOW AMBULATORY MEDICATION CHARGE: Performed by: PSYCHIATRY & NEUROLOGY

## 2023-11-27 ENCOUNTER — PHARMACY VISIT (OUTPATIENT)
Dept: PHARMACY | Facility: MEDICAL CENTER | Age: 48
End: 2023-11-27
Payer: COMMERCIAL

## 2023-12-01 ENCOUNTER — TELEPHONE (OUTPATIENT)
Dept: PHARMACY | Facility: MEDICAL CENTER | Age: 48
End: 2023-12-01
Payer: COMMERCIAL

## 2023-12-01 NOTE — TELEPHONE ENCOUNTER
Contact:           Phone number: [phone]   Name of person spoken with and relationship to patient: Earnestine Pete [id number]  Medication:  Patient’s Adherence:           How patient is doing on medication: well   How many missed doses and reason: 0   Any new medications: no   Any new conditions: no   Any new allergies: no   Any new side effects: no    Any new diagnoses: no    How many doses remainin   Did patient want to speak with pharmacist: no  Delivery:           Delivery date and method: RECEIVED    Needs by Date:    Signature required: no   Any additional details for : OLIVER Tello Appointment Date: OLIVER  Shipping Address: [address] [place] [id number]  Medication(name, strength and dose): EMGALITY 120MG  Copay: $0  Payment Method: NA

## 2023-12-04 ENCOUNTER — HOSPITAL ENCOUNTER (OUTPATIENT)
Dept: RADIOLOGY | Facility: MEDICAL CENTER | Age: 48
End: 2023-12-04
Payer: COMMERCIAL

## 2023-12-04 DIAGNOSIS — R19.03 RIGHT LOWER QUADRANT ABDOMINAL SWELLING, MASS AND LUMP: ICD-10-CM

## 2023-12-04 PROCEDURE — 76705 ECHO EXAM OF ABDOMEN: CPT

## 2023-12-05 ENCOUNTER — HOSPITAL ENCOUNTER (OUTPATIENT)
Dept: LAB | Facility: MEDICAL CENTER | Age: 48
End: 2023-12-05
Attending: INTERNAL MEDICINE
Payer: COMMERCIAL

## 2023-12-05 LAB
25(OH)D3 SERPL-MCNC: 59 NG/ML (ref 30–100)
BASOPHILS # BLD AUTO: 1.4 % (ref 0–1.8)
BASOPHILS # BLD: 0.08 K/UL (ref 0–0.12)
EOSINOPHIL # BLD AUTO: 0.08 K/UL (ref 0–0.51)
EOSINOPHIL NFR BLD: 1.4 % (ref 0–6.9)
ERYTHROCYTE [DISTWIDTH] IN BLOOD BY AUTOMATED COUNT: 42.7 FL (ref 35.9–50)
ESTRADIOL SERPL-MCNC: 12.1 PG/ML
HCT VFR BLD AUTO: 47.5 % (ref 37–47)
HGB BLD-MCNC: 16.1 G/DL (ref 12–16)
IMM GRANULOCYTES # BLD AUTO: 0.02 K/UL (ref 0–0.11)
IMM GRANULOCYTES NFR BLD AUTO: 0.3 % (ref 0–0.9)
LYMPHOCYTES # BLD AUTO: 1.28 K/UL (ref 1–4.8)
LYMPHOCYTES NFR BLD: 21.7 % (ref 22–41)
MCH RBC QN AUTO: 32.9 PG (ref 27–33)
MCHC RBC AUTO-ENTMCNC: 33.9 G/DL (ref 32.2–35.5)
MCV RBC AUTO: 97.1 FL (ref 81.4–97.8)
MONOCYTES # BLD AUTO: 0.41 K/UL (ref 0–0.85)
MONOCYTES NFR BLD AUTO: 6.9 % (ref 0–13.4)
NEUTROPHILS # BLD AUTO: 4.03 K/UL (ref 1.82–7.42)
NEUTROPHILS NFR BLD: 68.3 % (ref 44–72)
NRBC # BLD AUTO: 0 K/UL
NRBC BLD-RTO: 0 /100 WBC (ref 0–0.2)
PLATELET # BLD AUTO: 339 K/UL (ref 164–446)
PMV BLD AUTO: 9.8 FL (ref 9–12.9)
RBC # BLD AUTO: 4.89 M/UL (ref 4.2–5.4)
T3FREE SERPL-MCNC: 2.49 PG/ML (ref 2–4.4)
T4 FREE SERPL-MCNC: 0.86 NG/DL (ref 0.93–1.7)
TSH SERPL DL<=0.005 MIU/L-ACNC: 0.66 UIU/ML (ref 0.38–5.33)
VIT B12 SERPL-MCNC: 457 PG/ML (ref 211–911)
WBC # BLD AUTO: 5.9 K/UL (ref 4.8–10.8)

## 2023-12-05 PROCEDURE — 84403 ASSAY OF TOTAL TESTOSTERONE: CPT

## 2023-12-05 PROCEDURE — 84270 ASSAY OF SEX HORMONE GLOBUL: CPT

## 2023-12-05 PROCEDURE — 85025 COMPLETE CBC W/AUTO DIFF WBC: CPT

## 2023-12-05 PROCEDURE — 84443 ASSAY THYROID STIM HORMONE: CPT

## 2023-12-05 PROCEDURE — 82670 ASSAY OF TOTAL ESTRADIOL: CPT

## 2023-12-05 PROCEDURE — 82607 VITAMIN B-12: CPT

## 2023-12-05 PROCEDURE — 84402 ASSAY OF FREE TESTOSTERONE: CPT

## 2023-12-05 PROCEDURE — 84439 ASSAY OF FREE THYROXINE: CPT

## 2023-12-05 PROCEDURE — 84481 FREE ASSAY (FT-3): CPT

## 2023-12-05 PROCEDURE — 82306 VITAMIN D 25 HYDROXY: CPT

## 2023-12-05 PROCEDURE — 36415 COLL VENOUS BLD VENIPUNCTURE: CPT

## 2023-12-08 LAB
SHBG SERPL-SCNC: 26 NMOL/L (ref 17–125)
TESTOST FREE SERPL-MCNC: 34.2 PG/ML (ref 1.1–5.8)
TESTOST SERPL-MCNC: 175 NG/DL (ref 9–55)

## 2023-12-14 RX ORDER — HYDROCHLOROTHIAZIDE 25 MG/1
TABLET ORAL
Qty: 180 TABLET | Refills: 3 | Status: SHIPPED | OUTPATIENT
Start: 2023-12-14

## 2023-12-29 ENCOUNTER — PHARMACY VISIT (OUTPATIENT)
Dept: PHARMACY | Facility: MEDICAL CENTER | Age: 48
End: 2023-12-29
Payer: COMMERCIAL

## 2023-12-29 DIAGNOSIS — G40.909 SEIZURE DISORDER (HCC): ICD-10-CM

## 2023-12-29 PROCEDURE — RXMED WILLOW AMBULATORY MEDICATION CHARGE: Performed by: PSYCHIATRY & NEUROLOGY

## 2023-12-29 RX ORDER — GALCANEZUMAB 120 MG/ML
120 INJECTION, SOLUTION SUBCUTANEOUS
Qty: 2 ML | Refills: 0 | Status: SHIPPED | OUTPATIENT
Start: 2023-12-29 | End: 2024-01-12 | Stop reason: SDUPTHER

## 2023-12-29 RX ORDER — GALCANEZUMAB 120 MG/ML
120 INJECTION, SOLUTION SUBCUTANEOUS
Qty: 1 ML | Refills: 0 | COMMUNITY
Start: 2023-12-29

## 2023-12-29 NOTE — TELEPHONE ENCOUNTER
Hello,     Patient is due for her dose of the EMGALITY soon and needs a sample ASAP from the [facility] since the [facility] one is out and its currently on back order.      Thanks,    Carolyne  Rx Coordinator

## 2024-01-09 ENCOUNTER — TELEPHONE (OUTPATIENT)
Dept: PHARMACY | Facility: MEDICAL CENTER | Age: 49
End: 2024-01-09
Payer: COMMERCIAL

## 2024-01-09 NOTE — TELEPHONE ENCOUNTER
Medication: EMGALITY 120MG/ML SOAJ  Type of Insurance: Commercial  Type of Financial assistance requested Copay Card  Source: [employer] Copay Card  Source Phone #:  [phone]  Outcome: Approved  Effective dates: 01/01/2024 until 12/31/2024  Details/Billing Information:   [id number]  PCN: [id number]  GRP: [id number]  ID: [id number]  Max Award Amount: $4900  Final Copay: $0

## 2024-01-10 ENCOUNTER — APPOINTMENT (OUTPATIENT)
Dept: NEUROLOGY | Facility: MEDICAL CENTER | Age: 49
End: 2024-01-10
Attending: STUDENT IN AN ORGANIZED HEALTH CARE EDUCATION/TRAINING PROGRAM
Payer: COMMERCIAL

## 2024-01-12 DIAGNOSIS — G40.909 SEIZURE DISORDER (HCC): ICD-10-CM

## 2024-01-16 RX ORDER — GALCANEZUMAB 120 MG/ML
120 INJECTION, SOLUTION SUBCUTANEOUS
Qty: 2 ML | Refills: 0 | Status: SHIPPED | OUTPATIENT
Start: 2024-01-16 | End: 2024-02-23

## 2024-01-17 NOTE — TELEPHONE ENCOUNTER
EMGALITY 120MG/ML SOLN PFS    PA STATUS- Available without authorization. RTC  $767.88 1/30  FA STATUS- APPROVED THRU 12/31/2024

## 2024-01-19 DIAGNOSIS — F51.01 PRIMARY INSOMNIA: ICD-10-CM

## 2024-01-19 RX ORDER — ESZOPICLONE 1 MG/1
1 TABLET, FILM COATED ORAL
Qty: 30 TABLET | Refills: 0 | Status: SHIPPED | OUTPATIENT
Start: 2024-01-19 | End: 2024-02-18

## 2024-01-19 NOTE — TELEPHONE ENCOUNTER
Pts pharmacy did not receive last RX refill they are requesting we resend it .. Thank you!    Received request via: Patient    Was the patient seen in the last year in this department? Yes    Does the patient have an active prescription (recently filled or refills available) for medication(s) requested? No    Pharmacy Name: [facility]    Does the patient have group home Plus and need 100 day supply (blood pressure, diabetes and cholesterol meds only)? Medication is not for cholesterol, blood pressure or diabetes

## 2024-01-30 ENCOUNTER — TELEPHONE (OUTPATIENT)
Dept: NEUROLOGY | Facility: MEDICAL CENTER | Age: 49
End: 2024-01-30
Payer: COMMERCIAL

## 2024-01-30 NOTE — TELEPHONE ENCOUNTER
Hello,    Pts Emgality is now non-formulary on pts plan. Pt's insurance covers aimovig or ajovy due to formulary change. I see pt has an appt in Feb. Let me know if you'll be changing medication.      Thanks,    Carolyne  Rx Coordinator

## 2024-02-02 ENCOUNTER — OFFICE VISIT (OUTPATIENT)
Dept: BEHAVIORAL HEALTH | Facility: CLINIC | Age: 49
End: 2024-02-02
Payer: COMMERCIAL

## 2024-02-02 ENCOUNTER — APPOINTMENT (OUTPATIENT)
Dept: LAB | Facility: MEDICAL CENTER | Age: 49
End: 2024-02-02
Attending: INTERNAL MEDICINE
Payer: COMMERCIAL

## 2024-02-02 DIAGNOSIS — F31.81 BIPOLAR 2 DISORDER (HCC): ICD-10-CM

## 2024-02-02 DIAGNOSIS — F51.01 PRIMARY INSOMNIA: ICD-10-CM

## 2024-02-02 DIAGNOSIS — F12.90 CANNABIS USE DISORDER: ICD-10-CM

## 2024-02-02 DIAGNOSIS — F41.1 GENERALIZED ANXIETY DISORDER: ICD-10-CM

## 2024-02-02 PROCEDURE — 99214 OFFICE O/P EST MOD 30 MIN: CPT | Performed by: PSYCHIATRY & NEUROLOGY

## 2024-02-02 RX ORDER — ESZOPICLONE 1 MG/1
.5-1 TABLET, FILM COATED ORAL
Qty: 30 TABLET | Refills: 0 | Status: SHIPPED | OUTPATIENT
Start: 2024-03-19 | End: 2024-02-23

## 2024-02-02 RX ORDER — ESZOPICLONE 1 MG/1
.5-1 TABLET, FILM COATED ORAL
Qty: 30 TABLET | Refills: 0 | Status: SHIPPED | OUTPATIENT
Start: 2024-04-18 | End: 2024-02-23

## 2024-02-02 RX ORDER — ESZOPICLONE 1 MG/1
.5-1 TABLET, FILM COATED ORAL
Qty: 30 TABLET | Refills: 0 | Status: SHIPPED | OUTPATIENT
Start: 2024-02-18 | End: 2024-03-18

## 2024-02-02 RX ORDER — CARIPRAZINE 3 MG/1
3 CAPSULE, GELATIN COATED ORAL EVERY EVENING
Qty: 90 CAPSULE | Refills: 1 | Status: SHIPPED | OUTPATIENT
Start: 2024-02-02

## 2024-02-02 ASSESSMENT — PATIENT HEALTH QUESTIONNAIRE - PHQ9: CLINICAL INTERPRETATION OF PHQ2 SCORE: 0
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
## 2024-02-07 ENCOUNTER — HOSPITAL ENCOUNTER (OUTPATIENT)
Dept: LAB | Facility: MEDICAL CENTER | Age: 49
End: 2024-02-07
Attending: INTERNAL MEDICINE
Payer: COMMERCIAL

## 2024-02-07 LAB
BASOPHILS # BLD AUTO: 1.3 % (ref 0–1.8)
BASOPHILS # BLD: 0.07 K/UL (ref 0–0.12)
EOSINOPHIL # BLD AUTO: 0.07 K/UL (ref 0–0.51)
EOSINOPHIL NFR BLD: 1.3 % (ref 0–6.9)
ERYTHROCYTE [DISTWIDTH] IN BLOOD BY AUTOMATED COUNT: 41.4 FL (ref 35.9–50)
ESTRADIOL SERPL-MCNC: 40.1 PG/ML
FSH SERPL-ACNC: 16.7 MIU/ML
HCT VFR BLD AUTO: 47 % (ref 37–47)
HGB BLD-MCNC: 15.9 G/DL (ref 12–16)
IMM GRANULOCYTES # BLD AUTO: 0.01 K/UL (ref 0–0.11)
IMM GRANULOCYTES NFR BLD AUTO: 0.2 % (ref 0–0.9)
LH SERPL-ACNC: 5 IU/L
LYMPHOCYTES # BLD AUTO: 1.49 K/UL (ref 1–4.8)
LYMPHOCYTES NFR BLD: 27.8 % (ref 22–41)
MCH RBC QN AUTO: 32.3 PG (ref 27–33)
MCHC RBC AUTO-ENTMCNC: 33.8 G/DL (ref 32.2–35.5)
MCV RBC AUTO: 95.3 FL (ref 81.4–97.8)
MONOCYTES # BLD AUTO: 0.46 K/UL (ref 0–0.85)
MONOCYTES NFR BLD AUTO: 8.6 % (ref 0–13.4)
NEUTROPHILS # BLD AUTO: 3.26 K/UL (ref 1.82–7.42)
NEUTROPHILS NFR BLD: 60.8 % (ref 44–72)
NRBC # BLD AUTO: 0 K/UL
NRBC BLD-RTO: 0 /100 WBC (ref 0–0.2)
PLATELET # BLD AUTO: 299 K/UL (ref 164–446)
PMV BLD AUTO: 9.7 FL (ref 9–12.9)
RBC # BLD AUTO: 4.93 M/UL (ref 4.2–5.4)
T3FREE SERPL-MCNC: 2.88 PG/ML (ref 2–4.4)
T4 FREE SERPL-MCNC: 1.37 NG/DL (ref 0.93–1.7)
TSH SERPL DL<=0.005 MIU/L-ACNC: 0.04 UIU/ML (ref 0.38–5.33)
WBC # BLD AUTO: 5.4 K/UL (ref 4.8–10.8)

## 2024-02-07 PROCEDURE — 84439 ASSAY OF FREE THYROXINE: CPT

## 2024-02-07 PROCEDURE — 84481 FREE ASSAY (FT-3): CPT

## 2024-02-07 PROCEDURE — 83002 ASSAY OF GONADOTROPIN (LH): CPT

## 2024-02-07 PROCEDURE — 84443 ASSAY THYROID STIM HORMONE: CPT

## 2024-02-07 PROCEDURE — 84403 ASSAY OF TOTAL TESTOSTERONE: CPT

## 2024-02-07 PROCEDURE — 84402 ASSAY OF FREE TESTOSTERONE: CPT

## 2024-02-07 PROCEDURE — 84270 ASSAY OF SEX HORMONE GLOBUL: CPT

## 2024-02-07 PROCEDURE — 82670 ASSAY OF TOTAL ESTRADIOL: CPT

## 2024-02-07 PROCEDURE — 85025 COMPLETE CBC W/AUTO DIFF WBC: CPT

## 2024-02-07 PROCEDURE — 36415 COLL VENOUS BLD VENIPUNCTURE: CPT

## 2024-02-07 PROCEDURE — 83001 ASSAY OF GONADOTROPIN (FSH): CPT

## 2024-02-12 LAB
SHBG SERPL-SCNC: 34 NMOL/L (ref 17–125)
TESTOST FREE SERPL-MCNC: 15.1 PG/ML (ref 1.1–5.8)
TESTOST SERPL-MCNC: 91 NG/DL (ref 9–55)

## 2024-02-14 DIAGNOSIS — N25.1 DIABETES INSIPIDUS, NEPHROGENIC (HCC): Chronic | ICD-10-CM

## 2024-02-16 ENCOUNTER — PATIENT MESSAGE (OUTPATIENT)
Dept: HEALTH INFORMATION MANAGEMENT | Facility: OTHER | Age: 49
End: 2024-02-16
Payer: COMMERCIAL

## 2024-02-16 DIAGNOSIS — G43.009 MIGRAINE WITHOUT AURA AND WITHOUT STATUS MIGRAINOSUS, NOT INTRACTABLE: ICD-10-CM

## 2024-02-19 RX ORDER — ERENUMAB-AOOE 70 MG/ML
70 INJECTION SUBCUTANEOUS ONCE
Qty: 1 ML | Refills: 5 | Status: SHIPPED | OUTPATIENT
Start: 2024-02-19 | End: 2024-02-19

## 2024-02-23 ENCOUNTER — OFFICE VISIT (OUTPATIENT)
Dept: NEUROLOGY | Facility: MEDICAL CENTER | Age: 49
End: 2024-02-23
Attending: STUDENT IN AN ORGANIZED HEALTH CARE EDUCATION/TRAINING PROGRAM
Payer: COMMERCIAL

## 2024-02-23 VITALS
TEMPERATURE: 97.9 F | HEIGHT: 64 IN | OXYGEN SATURATION: 99 % | HEART RATE: 83 BPM | SYSTOLIC BLOOD PRESSURE: 110 MMHG | DIASTOLIC BLOOD PRESSURE: 64 MMHG | WEIGHT: 143.96 LBS | BODY MASS INDEX: 24.58 KG/M2

## 2024-02-23 DIAGNOSIS — Z12.11 SCREENING FOR COLORECTAL CANCER: ICD-10-CM

## 2024-02-23 DIAGNOSIS — R74.8 ELEVATED LIVER ENZYMES: ICD-10-CM

## 2024-02-23 DIAGNOSIS — Z87.891 FORMER SMOKER: ICD-10-CM

## 2024-02-23 DIAGNOSIS — G89.4 CHRONIC PAIN SYNDROME: ICD-10-CM

## 2024-02-23 DIAGNOSIS — R79.89 LOW TSH LEVEL: ICD-10-CM

## 2024-02-23 DIAGNOSIS — R25.1 TREMOR: ICD-10-CM

## 2024-02-23 DIAGNOSIS — N25.1 DIABETES INSIPIDUS, NEPHROGENIC (HCC): ICD-10-CM

## 2024-02-23 DIAGNOSIS — F51.01 PRIMARY INSOMNIA: ICD-10-CM

## 2024-02-23 DIAGNOSIS — G40.909 SEIZURE DISORDER (HCC): ICD-10-CM

## 2024-02-23 DIAGNOSIS — F41.1 GENERALIZED ANXIETY DISORDER: ICD-10-CM

## 2024-02-23 DIAGNOSIS — G43.E11 INTRACTABLE CHRONIC MIGRAINE WITH AURA WITH STATUS MIGRAINOSUS: ICD-10-CM

## 2024-02-23 DIAGNOSIS — Z12.12 SCREENING FOR COLORECTAL CANCER: ICD-10-CM

## 2024-02-23 DIAGNOSIS — R11.2 NAUSEA AND VOMITING, UNSPECIFIED VOMITING TYPE: ICD-10-CM

## 2024-02-23 DIAGNOSIS — F31.9 BIPOLAR AFFECTIVE DISORDER, REMISSION STATUS UNSPECIFIED (HCC): ICD-10-CM

## 2024-02-23 DIAGNOSIS — E03.9 HYPOTHYROIDISM, UNSPECIFIED TYPE: ICD-10-CM

## 2024-02-23 PROCEDURE — 3074F SYST BP LT 130 MM HG: CPT | Performed by: STUDENT IN AN ORGANIZED HEALTH CARE EDUCATION/TRAINING PROGRAM

## 2024-02-23 PROCEDURE — 3078F DIAST BP <80 MM HG: CPT | Performed by: STUDENT IN AN ORGANIZED HEALTH CARE EDUCATION/TRAINING PROGRAM

## 2024-02-23 PROCEDURE — 700111 HCHG RX REV CODE 636 W/ 250 OVERRIDE (IP): Mod: JZ | Performed by: STUDENT IN AN ORGANIZED HEALTH CARE EDUCATION/TRAINING PROGRAM

## 2024-02-23 PROCEDURE — 99214 OFFICE O/P EST MOD 30 MIN: CPT | Performed by: STUDENT IN AN ORGANIZED HEALTH CARE EDUCATION/TRAINING PROGRAM

## 2024-02-23 PROCEDURE — 99212 OFFICE O/P EST SF 10 MIN: CPT | Performed by: STUDENT IN AN ORGANIZED HEALTH CARE EDUCATION/TRAINING PROGRAM

## 2024-02-23 RX ORDER — ALPRAZOLAM 0.25 MG/1
0.25 TABLET ORAL NIGHTLY PRN
COMMUNITY
End: 2024-02-23 | Stop reason: SDUPTHER

## 2024-02-23 RX ORDER — GALCANEZUMAB 120 MG/ML
120 INJECTION, SOLUTION SUBCUTANEOUS
Qty: 2 ML | Refills: 0 | Status: SHIPPED | OUTPATIENT
Start: 2024-02-23 | End: 2024-04-24

## 2024-02-23 RX ORDER — ONDANSETRON 4 MG/1
4 TABLET, ORALLY DISINTEGRATING ORAL EVERY 8 HOURS PRN
Qty: 10 TABLET | Refills: 2 | Status: SHIPPED | OUTPATIENT
Start: 2024-02-23

## 2024-02-23 RX ORDER — KETOROLAC TROMETHAMINE 30 MG/ML
30 INJECTION, SOLUTION INTRAMUSCULAR; INTRAVENOUS ONCE
Status: COMPLETED | OUTPATIENT
Start: 2024-02-23 | End: 2024-02-23

## 2024-02-23 RX ORDER — LAMOTRIGINE 200 MG/1
200 TABLET ORAL 2 TIMES DAILY
Qty: 180 TABLET | Refills: 3 | Status: SHIPPED | OUTPATIENT
Start: 2024-02-23 | End: 2025-02-17

## 2024-02-23 RX ORDER — ALPRAZOLAM 0.25 MG/1
0.25 TABLET ORAL NIGHTLY PRN
Qty: 90 TABLET | Refills: 0 | Status: SHIPPED | OUTPATIENT
Start: 2024-02-23 | End: 2024-05-23

## 2024-02-23 RX ADMIN — KETOROLAC TROMETHAMINE 30 MG: 30 INJECTION, SOLUTION INTRAMUSCULAR at 07:58

## 2024-02-23 RX ADMIN — KETOROLAC TROMETHAMINE 30 MG: 30 INJECTION, SOLUTION INTRAMUSCULAR at 07:59

## 2024-02-23 ASSESSMENT — VISUAL ACUITY: VA_NORMAL: 1

## 2024-02-23 NOTE — PATIENT INSTRUCTIONS
NEUROLOGY CLINIC VISIT WITH DR. BRUMFIELD     PLEASE READ THIS ENTIRE DOCUMENT CAREFULLY AND COMPLETELY:    First and foremost, you matter to Dr. Brumfield and you deserve the best care.   Dr. Brumfiled prides himself on providing the best possible care to all his patients. He strives to make each appointment meaningful, so that all your concerns are being addressed and all your neurological problems are being optimally treated. In order to achieve these goals for everyone, Dr. Brumfield has listed important reminders and the best ways to prepare for each appointment. Please read each item carefully. Thank you!    Due to the high volume of patients we are trying to help, your physician will not be able to respond by phone or in [employer] to your routine concerns between appointments.  This does not reflect a lack of interest or concern for you or your diagnosis.  Please bring these questions and concerns to your appointment where your physician can answer.  Please relay more pressing concerns to our office, either via [employer], or by phone; if not able to reach us please visit nearby Urgent Care Center or Emergency Department.  If any emergent medical needs, please seek emergent medical help and/or call 911.    Also, please note that we are not able to fill out paperwork that might be related to your work, utility company, disability, and/or driving, among others, in between the visits.  Please schedule a dedicated appointment to address any and all paperwork.  This is not due to lack of concern or interest for your disease-related work/administrative problems, but to make sure that we provide the best possible care and to fill out your paperwork in a correct, complete, and timely manner.  ------------------------------------------------------------------------------------------  Please let our office know if you have any changes in your seizure frequency and/characteristics.     Please keep a diary of your seizures and bring it  with you to each appointment.    Please take vitamin D3 [id number] internation units daily.     Please abstain from driving until further notice    If you are a biological female with epilepsy who is of reproductive age, who is actively breastfeeding, and/or who infants/young children:  Please take folic acid 1 mg daily. This is an over-the-counter supplement that is recommended to prevent certain developmental problems in your baby, in case you become pregnant in the future.  It is critical that you let our office know as soon as you become pregnant or plan to become pregnant.  If you are caring for a baby/young child, please make sure to be sitting on a soft surface while holding your baby/young child, so in case you have a seizure, your baby/young child is not injured due to fall.   Please let us know if, while breastfeeding, you observe that your baby is excessively sleepy and/or has other behavioral changes. Because many antiseizure medications are collected in breast milk, some nursing babies can suffer adverse medication effects.    Please note that the following might precipitate seizures:   missed doses of antiseizure medications  being sick with a fever, stress  Fatigue  sleep deprivation or abnormal sleeping patterns  not eating regularly  not drinking enough water  drinking too much alcohol  stopping alcohol suddenly if you are currently using it on a regular/daily basis,   using recreational drugs, among others.    Please note that the following might lead to an injury or even be life-threatening in the event you have a seizure and/or lose awareness while:  being in a large body of water by yourself, such as bath, pool, lake, ocean, among others (risk of drowning)  being on unprotected heights (risk of fall)  being around and/or operating heavy machinery (risk of injury)  being around open fire/hot surfaces (risk of burns)  any other activities/circumstances, in which if you lose awareness, you might  injure yourself and/or others.  -------------------------------------------------------------------------------------------  SUDEP (SUDDEN UNEXPECTED DEATH IN EPILEPSY)  It is important that your seizures are well controlled and you have none or have them rarely. In addition to avoiding injury related to breakthrough seizures, frequent seizures increase risk of SUDEP (sudden unexpected death in epilepsy), where a person goes into a seizure and then never wakes up. The best way to prevent SUDEP is to control your seizures well.   ------------------------------------------------------------------------------------------  Please call for help (crisis line and/or 911) in case you have thoughts of harming yourself and/or others.  ------------------------------------------------------------------------------------------  INSTRUCTIONS FOR YOUR FAMILY/CAREGIVERS:  Please call 911 if the patient has a seizure longer than 2-3 minutes, if seizures are back to back without her recovering to her baseline, or she does not start recovering within 5-10 minutes after the seizure stops. During the seizure - please turn her on her side, please make sure her head is protected (for example, you should put a pillow under her head, if one is available), and please do not put anything in her mouth.   ------------------------------------------------------------------------------------------  PATIENT EXPECTATIONS,  IMPORTANT APPOINTMENT REMINDERS, AND ADDITIONAL HELPFUL TIPS:   REFILLS:   Request refills AT LEAST 1 week in advance to ensure you do not run out of medications    MyChart  It is STRONGLY encouraged that ALL patients sign up for MyChart. It is BY FAR the fastest and most convenient way for both Dr. Brumfield and patients to obtain timely refills.  If you are having trouble signing up or logging into your account, staff are available to help you. Please ask a medical assistant or staff at the  to assist you.    TEST RESULS:    All labs and diagnostic test results will be reviewed at your next visit, UNLESS  Dr. Brumfield determines that there are important findings on the tests need to be acted on sooner. Dr. Brumfield will either call or send a message through [employer] if this is the case.    BE PREPARED PRIOR TO EVERY APPOINTMENT:  All patient are responsible for ensuring that ALL test results that were completed outside of the [facility] system have been received by our Neurology Department PRIOR to your appointment with Dr. Brumfield.    IMPORTANT:  ALL images (not just the reports) must be sent and uploaded to the [facility] system. Dr. Brumfield reviews all images personally prior to each visit. Ensuring that ALL the test results and test images are accessible to Dr. Brumfield prior to your appointment is YOUR responsibility and an important part of making the most out of each appointment.   Bring a government-issues picture ID and an updated insurance card EVERY visit.  It is highly recommended that you bring at every visit a list of the most important topics that you want address. While it may not be possible to address all items on the list in a single visit, preparing a list will ensure that Dr. Brumfield addresses the items that are most important to you and your health    PAPERWORK, DOCUMENTATION, LETTER REQUESTS:  You must notify the office ahead of your appointment of all paperwork or letter requests.   Please DO NOT wait until the last minute to make these requests. Please give all paperwork to the medical assistant at the start of the appointment and check-in process. Please note that Dr. Brumfield may not be able complete some types of documentation in a single appointment or even within a single day or week. This is why it is important to communicate paperwork requests prior to your appointment and at least 2 weeks prior to any deadlines.    KNOW ALL YOU MEDICATIONS:   AT EVERY SINGLE APPOINTMENT, please bring a list of every single prescribed,  non-prescribed, and over the counter medication or supplements you are taking, including ones taken on a rare or intermittent basis.  Include the following information for each prescribed or non-prescribed medications:  Name of medication   The strength of EACH pill/capsule/tablet, etc.   The number of pills/capsules/tablets, etc taken per dose  The number and time of day that doses are taken  For every single Supplement that you take on a routine or intermittent basis, you must include:  The Brand Name   A complete list of every single ingredient, compound, vitamin, and/or mineral in each dose, along with the corresponding amounts/strengths of all ingredients, vitamins, minerals, etc., if such information is provided or known  The number of doses taken per day and time of day doses are taken  If medications are taken on an intermittent or as needed basis, please estimate how many days per week or days per month the medications are used  DO NOT just print out your medication list from [employer] or bring a list from a prior appointment or hospitalizations because the information is often often unreliable, inaccurate, outdated, and/or incomplete   The list should be printed or written  If you forget or do not have a list of all the medication, then it is acceptable, although less preferred, to bring all the bottles to the appointment     ARRIVE EARLY FOR ALL VISITS:  Please note that we are unable to accommodate late arrivals as per office policy.  YOU-the patient - (NOT a parent, spouse, or friend) must be physically present at check-in no later than 12 minutes after the scheduled appointment time, or you will be asked to reschedule   Consider scheduling a virtual appointment with Dr. Brumfield through [employer] as an alternative if transportation to the clinic is difficult or unavailable   Please note, however, that virtual visits can only be scheduled after being an established patient of Dr. Brumfield. All new appointments  "must be done in-person in clinic  Some insurances will not cover the cost of virtual appointments. Please check with your insurance to find out if these visits are covered    COMMUNICATING URGENT AND NON-URGENT MATTERS:  Your concerns are important and deserve to be heard and addressed. If you have an urgent matter, there are two methods that will ensure your concerns are prioritized appropriately:   Preferred method: Sign-up/Login to your [employer] account and send a message addressed to Dr. Brumfield or Nicole Malin (Dr. Brumfield's assistant). In the subject line, type \"urgent\" followed by a word or phrase describing the situation (For example, write \"Urgent: Out of antiseuzre med and need refill\" or \"Urgent: Severe side effects to new meds\". In doing this, our staff can ensure urgent messages are triaged appropriately and communicated to Dr. Brumfield that day.  Call [facility] main line at [phone]. Dr. Brumfield's voicemail extension is [phone]. When leaving a voice message, specifically indicate if it is urgent (or non-urgent) so that the matter can be triaged appropriately and addressed in a timely manner    Thank you for entrusting your neurological care to [facility] and we look forward to continuing to serve you.   "

## 2024-02-23 NOTE — PROGRESS NOTES
NEUROLOGY FOLLOW-UP - 02/23/2024     REASON FOR VISIT: Earnestine Lindsay 48 y.o. female presents today for follow-up       SUMMARY RELEVANT PAST MEDICAL HISTORY AND/OR COMPENDIUM OF RELEVANT WORK-UP AND TREATMENTS TO DATE:      INTERVAL HISTORY:  Patient returns for routine follow-up. In regards to her seizures, she is doing well. She remains on Lamictal 200 mg BID monotherapy. She is compliant with medicine. Denies any side effects. She has not had a seizure in well over a year. Mood is good. Sleep is fair. Still has trouble falling and staying a asleep but regular exercise and as needed Xanax 0.25 mg does help.    I reviewed her labs. Most recent labs are notable for low TSH. She is following up with her other doctors regarding managing this.    She had been on emgality up until about a month or so ago when patient reports issues with her insurance not covering the cost of this medically necessary treatment. As a result of this she was unable to take her emgality shot last month. She has had a a daily headache for the past several weeks, with 2-3 days incapacitating. Stopping this medicine has affecting her quality mood, productivity, sleep, and overall quality of life.    CURRENT MEDICATIONS AT THE TIME OF THIS ENCOUNTER:  Current Outpatient Medications on File Prior to Visit   Medication Sig Dispense Refill    eszopiclone (LUNESTA) 1 MG Tab tablet Take 0.5-1 Tablets by mouth at bedtime as needed for Insomnia for up to 30 days. Indications: Trouble Sleeping 30 Tablet 0    [START ON 3/19/2024] eszopiclone (LUNESTA) 1 MG Tab tablet Take 0.5-1 Tablets by mouth at bedtime as needed for Insomnia for up to 30 days. Indications: Trouble Sleeping 30 Tablet 0    [START ON 4/18/2024] eszopiclone (LUNESTA) 1 MG Tab tablet Take 0.5-1 Tablets by mouth at bedtime as needed for Insomnia for up to 30 days. Indications: Trouble Sleeping 30 Tablet 0    VRAYLAR 3 MG capsule Take 1 Capsule by mouth every evening. 90 Capsule 1     "hydroCHLOROthiazide 25 MG Tab TAKE 1 TABLET BY MOUTH TWICE A  Tablet 3    triamcinolone acetonide (KENALOG) 0.1 % Cream Apply 1 Application topically 2 times a day. 30 g 3    liothyronine (CYTOMEL) 5 MCG Tab Take 5 mcg by mouth every day. Take 1 tablet by mouth every day on an empty stomach.      escitalopram (LEXAPRO) 10 MG Tab Take 1 Tablet by mouth every morning. 90 Tablet 1    Potassium Citrate 15 MEQ (1620 MG) Tab CR TAKE 2 TABLETS BY MOUTH TWICE A  Tablet 3    phentermine (ADIPEX-P) 37.5 MG tablet Take 37.5 mg by mouth every day.      testosterone cypionate (DEPO-TESTOSTERONE) 200 MG/ML Solution injection Inject 0.25 mL into the shoulder, thigh, or buttocks every 7 days.      levothyroxine (SYNTHROID) 75 MCG Tab 1 Tablet.       No current facility-administered medications on file prior to visit.          EXAM:   /64 (BP Location: Left arm, Patient Position: Sitting, BP Cuff Size: Adult)   Pulse 83   Temp 36.6 °C (97.9 °F) (Temporal)   Ht 1.626 m (5' 4.02\")   Wt 65.3 kg (143 lb 15.4 oz)   SpO2 99%    Wt Readings from Last 5 Encounters:   02/23/24 65.3 kg (143 lb 15.4 oz)   11/20/23 65 kg (143 lb 3.2 oz)   11/10/23 65.3 kg (144 lb)   11/08/23 64.9 kg (143 lb)   05/08/23 70.9 kg (156 lb 6.4 oz)      Physical Exam:  Physical Exam  HENT:      Head: Normocephalic and atraumatic.      Mouth/Throat:      Mouth: Mucous membranes are moist.   Eyes:      Extraocular Movements: EOM normal. No nystagmus.      Pupils: Pupils are equal, round, and reactive to light.   Skin:     General: Skin is warm and dry.      Coloration: Skin is not jaundiced.      Comments: Subtle scaly red rashes on the left forehead and right forearm   Neurological:      Mental Status: She is alert.      Motor: Motor strength is normal.  Psychiatric:         Speech: Speech normal.      Comments: Flat affect. No tics, tremors        Neurological Exam   Neurological Exam  Mental Status  Alert. Speech is normal. Language is " fluent with no aphasia. Attention and concentration are normal.    Cranial Nerves  CN II: Visual acuity is normal. Visual fields full to confrontation.  CN III, IV, VI: Extraocular movements intact bilaterally. No nystagmus. Normal saccades. Normal smooth pursuit. Pupils equal round and reactive to light bilaterally.   Right pupil: 3 mm. Round. Reactive to light. Reactive to accommodation.   Left pupil: 3 mm. Round. Reactive to light. Reactive to accommodation.  Relative afferent pupillary defect absent.  CN V: Facial sensation is normal.  CN VII: Full and symmetric facial movement.  CN VIII: Hearing is normal.  CN IX, X: Palate elevates symmetrically  CN XI: Shoulder shrug strength is normal.  CN XII: Tongue midline without atrophy or fasciculations.    Motor  Normal muscle bulk throughout. No fasciculations present. Normal muscle tone. No abnormal involuntary movements. Strength is 5/5 throughout all four extremities.    Sensory  Light touch is normal in upper and lower extremities.  No right-sided hemispatial neglect. No left-sided hemispatial neglect.    Coordination  Right: Finger-to-nose abnormality:Left: Finger-to-nose abnormality:  Mild bilateral intention tremor of FNF.    Gait  Casual gait is normal including stance, stride, and arm swing.         ASSESSMENT, EDUCATION, AND COUNSELING:  This is a 48 y.o. female patient who presents to the neurology clinic. We had an extensive discussion about the patient's symptoms, signs, and work-up to date, if any. We discussed potential and/or definitive diagnoses, work-up, and potential treatments.     PLAN:  If applicable, the work-up such as labs, imaging, procedures, and/or other testing, referrals, and/or recommended treatment strategies are listed below.    Medications were administered today in clinic (if any):  Administrations This Visit       ketorolac (Toradol) injection 30 mg       Admin Date  02/23/2024 Action  Given Dose  30 mg Route  Intramuscular  Documented By  Luis Murphy Ass't               Admin Date  02/23/2024 Action  Given Dose  30 mg Route  Intramuscular Documented By  Christie Varela, Luis Ass't                   Visit Diagnoses     ICD-10-CM   1. Seizure disorder (HCC)  G40.909   2. Primary insomnia  F51.01   3. Elevated liver enzymes  R74.8   4. Nausea and vomiting, unspecified vomiting type  R11.2   5. Intractable chronic migraine with aura with status migrainosus  G43.E11   6. Screening for colorectal cancer  Z12.11    Z12.12   7. Hypothyroidism, unspecified type  E03.9   8. Chronic pain syndrome  G89.4   9. Generalized anxiety disorder  F41.1   10. Bipolar affective disorder, remission status unspecified (HCC)  F31.9   11. Tremor  R25.1   12. Diabetes insipidus, nephrogenic (HCC)  N25.1   13. Former smoker  Z87.891   14. Low TSH level  R79.89      Orders Placed This Encounter    Comp Metabolic Panel    COLOGUARD (FIT DNA)    REFERRAL TO LUNG CANCER SCREENING PROGRAM    DISCONTD: ALPRAZolam (XANAX) 0.25 MG Tab    ALPRAZolam (XANAX) 0.25 MG Tab    lamotrigine (LAMICTAL) 200 MG tablet    ondansetron (ZOFRAN ODT) 4 MG TABLET DISPERSIBLE    Galcanezumab-gnlm 120 MG/ML Solution Auto-injector    ketorolac (Toradol) injection 30 mg    ketorolac (Toradol) injection 30 mg    Galcanezumab-gnlm (EMGALITY) 120 MG/ML Solution Auto-injector        Epilepsy - controlled on Lamictal monotherapy. No indication to make any changes. I have refilled Lamictal 200 mg BID, sent to pharmacy. I have also refilled Xanax which she ocasional takes for insomnia (and is insructed to take for seizures but has not had to use it for that purpose).     Intractable migraines, currently in status migrainosus - Giving 60 mg total of IM toradol shot today in clinic as she is in significant amount of pain. I am re-ordering emgality and will plan on appealing if this is denied by insurance since it is a medic ally necessary treatment as she clearly has her quality of life  severely impacted not being on it this past month. I am also requesting samples from [facility] pharmacy to get her restarted quickly while we work through insurance barrier. Samples (as well as emgality refills) have both been sent to pharmacy. Also refilled zofran for as needed nausea    Also needs updated CMP, in particular to track LFTs which have been mild elevated in the past.         BILLING DOCUMENTATION:     The number of minutes of face-to-face time spent in this encounter was   I spent a total of 35 minutes on the day of the visit.  . Over 50% of the time of the visit today was spent on counseling and/or coordination of care wtih the patient and/or family, as outlined above in assessment in plan.    Lalo Brumfield MD  Department of Neurology at [facility]  Diplomate of the American Board of Psychiatry and Neurology, General Neurology  Diplomate of American Board of Psychiatry and Neurology, a Member Board of the American Board of Medical Subspecialties, Epilepsy  Director of [facility]'s Level III Comprehensive Epilepsy Program  Professor of Clinical Neurology, [facility].   [address], [address]  [facility] [id number] [phone]   Fax: [phone]  E-mail: [email or web address]

## 2024-02-28 ENCOUNTER — PHARMACY VISIT (OUTPATIENT)
Dept: PHARMACY | Facility: MEDICAL CENTER | Age: 49
End: 2024-02-28
Payer: COMMERCIAL

## 2024-02-28 ENCOUNTER — TELEPHONE (OUTPATIENT)
Dept: HEALTH INFORMATION MANAGEMENT | Facility: OTHER | Age: 49
End: 2024-02-28
Payer: COMMERCIAL

## 2024-02-28 PROCEDURE — RXMED WILLOW AMBULATORY MEDICATION CHARGE: Performed by: STUDENT IN AN ORGANIZED HEALTH CARE EDUCATION/TRAINING PROGRAM

## 2024-02-28 NOTE — TELEPHONE ENCOUNTER
1.  Age 50-77 yrs of age? 48    2. Total Years Smoking cigarettes?  13 years     3. 20 pack year hx of smoking, or greater (average packs per day)?  13yrs @ 2pk/day= 26 pk yrs     4. Current smoker or if quit, has pt quit within last 15 yrs?  Former, quit 10 years ago     5. Completed Lung Cancer screen CT with [facility] previously? No      6. Anything noted on previous CT involving lungs? ([place], [place], Etc.) No     7. Any signs or symptoms of lung cancer? (New / change to Cough, Wheezing, S.O.B., coughing up blood, unexplained weight loss within last year)?  No    8. Previous history of lung cancer? No

## 2024-03-11 PROCEDURE — RXMED WILLOW AMBULATORY MEDICATION CHARGE: Performed by: STUDENT IN AN ORGANIZED HEALTH CARE EDUCATION/TRAINING PROGRAM

## 2024-03-11 NOTE — PROGRESS NOTES
Subjective     Earnestine Lindsay is a 48 y.o. female who presents with Lung Cancer Screening Program Prescreen            HPI  Patient seen today for initial lung cancer screening visit. Patient referred by Dr. Lalo Brumfield.  Her PCP is Radha APARICIO.     The patient meets eligibility criteria including age, smoking history (20+ pack years), if former smoker, quit in the last 15 years, and absence of signs or symptoms of lung cancer.    - Age - 48  - Smoking history - Patient has smoked for 13 years at an average of 2 ppd = 26 pack year smoking history.  Addendum (3/21/24) pt sent a [employer] message stating she started smoking May 1991 which would mean she smoked for 18 years at an average of 2 ppd for a 37 pack year history. -Dr. Alina Olvera  - Current smoking status - former smoker, quit smoking 2010  - No symptoms of lung cancer and no previous history of lung cancer     Allergies   Allergen Reactions    Promethazine Hives     Current Outpatient Medications on File Prior to Visit   Medication Sig Dispense Refill    ALPRAZolam (XANAX) 0.25 MG Tab Take 1 Tablet by mouth at bedtime as needed for Anxiety or Sleep for up to 90 days. Take 0.25 mg by mouth at bedtime as needed for Anxiety or Sleep. 90 Tablet 0    lamotrigine (LAMICTAL) 200 MG tablet Take 1 Tablet by mouth 2 times a day for 360 days. 180 Tablet 3    ondansetron (ZOFRAN ODT) 4 MG TABLET DISPERSIBLE Take 1 Tablet by mouth every 8 hours as needed for Nausea/Vomiting. 10 Tablet 2    Galcanezumab-gnlm 120 MG/ML Solution Auto-injector Inject 120 mg under the skin every 30 DAYS. 1 mL 11    Galcanezumab-gnlm (EMGALITY) 120 MG/ML Solution Auto-injector Inject 120 mg under the skin Q30 DAYS for 60 days. 2 mL 0    eszopiclone (LUNESTA) 1 MG Tab tablet Take 0.5-1 Tablets by mouth at bedtime as needed for Insomnia for up to 30 days. Indications: Trouble Sleeping 30 Tablet 0    VRAYLAR 3 MG capsule Take 1 Capsule by mouth every evening. 90 Capsule 1     "hydroCHLOROthiazide 25 MG Tab TAKE 1 TABLET BY MOUTH TWICE A  Tablet 3    triamcinolone acetonide (KENALOG) 0.1 % Cream Apply 1 Application topically 2 times a day. 30 g 3    liothyronine (CYTOMEL) 5 MCG Tab Take 10 mcg by mouth every day. Take 1 tablet by mouth every day on an empty stomach.      escitalopram (LEXAPRO) 10 MG Tab Take 1 Tablet by mouth every morning. 90 Tablet 1    Potassium Citrate 15 MEQ (1620 MG) Tab CR TAKE 2 TABLETS BY MOUTH TWICE A  Tablet 3    phentermine (ADIPEX-P) 37.5 MG tablet Take 37.5 mg by mouth every day.      testosterone cypionate (DEPO-TESTOSTERONE) 200 MG/ML Solution injection Inject 0.25 mL into the shoulder, thigh, or buttocks every 7 days.      levothyroxine (SYNTHROID) 75 MCG Tab 1 Tablet.       No current facility-administered medications on file prior to visit.       Review of Systems   Constitutional:  Negative for chills, fever and weight loss.   Respiratory:  Positive for cough and shortness of breath. Negative for hemoptysis, sputum production and wheezing.         Chronic, intermittent SOB for about 6 months which she attributes to Vraylar medication dose adjustment being started at that time.  No recent worsening of the SOB    She has a chronic productive cough with clear/white phlegm for more than 6 months without recent worsening.  She attributes this to post nasal drainage from her sinuses   Cardiovascular:  Negative for chest pain and palpitations.              Objective     /66 (BP Location: Right arm, Patient Position: Sitting, BP Cuff Size: Adult)   Pulse 92   Resp 14   Ht 1.626 m (5' 4\")   Wt 63.5 kg (140 lb)   SpO2 100%   BMI 24.03 kg/m²      Physical Exam  Constitutional:       Appearance: Normal appearance.   Cardiovascular:      Rate and Rhythm: Normal rate and regular rhythm.   Pulmonary:      Effort: Pulmonary effort is normal.      Breath sounds: Normal breath sounds.   Musculoskeletal:         General: No swelling. "   Neurological:      Mental Status: She is alert.                 Assessment & Plan        1. Personal history of tobacco use, presenting hazards to health  CT-LUNG CANCER-SCREENING             We conducted a shared decision-making process using a decision aid. We reviewed benefits and harms of screening, including false positives and potential need for additional diagnostic testing, the possibility of over diagnosis, and total radiation exposure.    We discussed the importance of adhering to annual LDCT screening. We also discussed the impact of comorbities on the patient's the ability or willingness to undergo diagnostic procedure(s) and treatment.    Counseling on the importance of maintaining cigarette smoking abstinence if former smoker; or the importance of smoking cessation if current smoker and, if appropriate, furnishing of information about tobacco cessation interventions.    Based on our discussion, we have decided to begin annual lung cancer screening starting now.    No follow-up needed unless imaging is abnormal

## 2024-03-18 ENCOUNTER — DOCUMENTATION (OUTPATIENT)
Dept: PHARMACY | Facility: MEDICAL CENTER | Age: 49
End: 2024-03-18

## 2024-03-18 ENCOUNTER — OFFICE VISIT (OUTPATIENT)
Dept: SLEEP MEDICINE | Facility: MEDICAL CENTER | Age: 49
End: 2024-03-18
Attending: FAMILY MEDICINE
Payer: COMMERCIAL

## 2024-03-18 VITALS
RESPIRATION RATE: 14 BRPM | HEIGHT: 64 IN | HEART RATE: 92 BPM | SYSTOLIC BLOOD PRESSURE: 104 MMHG | DIASTOLIC BLOOD PRESSURE: 66 MMHG | OXYGEN SATURATION: 100 % | BODY MASS INDEX: 23.9 KG/M2 | WEIGHT: 140 LBS

## 2024-03-18 DIAGNOSIS — F51.01 PRIMARY INSOMNIA: ICD-10-CM

## 2024-03-18 DIAGNOSIS — Z87.891 PERSONAL HISTORY OF TOBACCO USE, PRESENTING HAZARDS TO HEALTH: ICD-10-CM

## 2024-03-18 PROCEDURE — 3074F SYST BP LT 130 MM HG: CPT | Performed by: FAMILY MEDICINE

## 2024-03-18 PROCEDURE — G0296 VISIT TO DETERM LDCT ELIG: HCPCS | Performed by: FAMILY MEDICINE

## 2024-03-18 PROCEDURE — 3078F DIAST BP <80 MM HG: CPT | Performed by: FAMILY MEDICINE

## 2024-03-18 RX ORDER — ESZOPICLONE 1 MG/1
TABLET, FILM COATED ORAL
Qty: 30 TABLET | Refills: 0 | Status: SHIPPED | OUTPATIENT
Start: 2024-03-18 | End: 2024-04-17

## 2024-03-18 ASSESSMENT — ENCOUNTER SYMPTOMS
WHEEZING: 0
PALPITATIONS: 0
SPUTUM PRODUCTION: 0
FEVER: 0
WEIGHT LOSS: 0
SHORTNESS OF BREATH: 1
CHILLS: 0
HEMOPTYSIS: 0
COUGH: 1

## 2024-03-18 NOTE — Clinical Note
Thank you for referring Earnestine to the Lung Cancer Screening program.  I enrolled her today. I will update you re: abnormal findings. -Dr. Alina Olvera

## 2024-03-18 NOTE — PROGRESS NOTES
PHARMACIST PRE SCREEN - **TRF Onboarding**  Diagnosis: Migraine [G43.909]      Drug & Non-Drug Allergies: EMR Reviewed. No concerning drug or non-drug allergies.                                                                                          Drug Therapy (name/formulation/dose/route of admin/frequency):  Emgality 120mg/mL solution in auto-injector. Inject 1 pen under the skin every 30 days.   EMR Reviewed   - Dose Appropriateness (Y/N):  Y   - Renal or Hepatic Adjustments (Y/N):  N   - Any comorbidities, PMH, precautions, or contraindications that pose a medication safety concern? (Y/N):  N   - Any relevant lab work needed that affects the initial start date? (Y/N):      N   - List Past Treatments: toradol (intramuscular injections); emgality; botox; imitrex; fiorcet    - EMR Med List reviewed.  List DDI’s:              Cat C alprazolam + lamotrigine + lunesta + vraylar = additive/increased CNS depression, continue to monitor for sedation and constipation. Patient established on concurrent therapies.   - Patient’s ability to self-administer medication:  No issues identified per EMR  List Goals of Therapy:       - To reduce migraine frequency, duration, and severity   - To improve acute medication responsiveness and reduce the need for acute attacks   - To identify and modify migraine triggers    Patient pre-emptively opt out of clinical services for Emgality as of 3/18/2024      gap list patient first filled on 2/28/24, sample medication provided through [email or web address].

## 2024-03-18 NOTE — TELEPHONE ENCOUNTER
Follow up 5/3/2024    Received request via: Pharmacy    Was the patient seen in the last year in this department? Yes    Does the patient have an active prescription (recently filled or refills available) for medication(s) requested? No    Pharmacy Name: [facility]    Does the patient have jail Plus and need 100 day supply (blood pressure, diabetes and cholesterol meds only)? Medication is not for cholesterol, blood pressure or diabetes and Patient does not have SCP

## 2024-03-19 ENCOUNTER — PHARMACY VISIT (OUTPATIENT)
Dept: PHARMACY | Facility: MEDICAL CENTER | Age: 49
End: 2024-03-19
Payer: COMMERCIAL

## 2024-03-19 ENCOUNTER — PATIENT MESSAGE (OUTPATIENT)
Dept: SLEEP MEDICINE | Facility: MEDICAL CENTER | Age: 49
End: 2024-03-19
Payer: COMMERCIAL

## 2024-03-23 ENCOUNTER — HOSPITAL ENCOUNTER (OUTPATIENT)
Dept: RADIOLOGY | Facility: MEDICAL CENTER | Age: 49
End: 2024-03-23
Attending: FAMILY MEDICINE
Payer: COMMERCIAL

## 2024-03-23 DIAGNOSIS — Z87.891 PERSONAL HISTORY OF TOBACCO USE, PRESENTING HAZARDS TO HEALTH: ICD-10-CM

## 2024-03-23 PROCEDURE — 71271 CT THORAX LUNG CANCER SCR C-: CPT

## 2024-03-25 ENCOUNTER — TELEPHONE (OUTPATIENT)
Dept: SLEEP MEDICINE | Facility: MEDICAL CENTER | Age: 49
End: 2024-03-25
Payer: COMMERCIAL

## 2024-03-25 NOTE — TELEPHONE ENCOUNTER
"Phoned patient with results of LDCT exam performed 3/23/24.    Notified her that the results showed no concerning lung nodules  Recommend follow up CT in 12 months if she starts smoking again.  If she remains tobacco free, then she will be 15 years since her last cigarette at the time of her 12 month follow-up and will have \"graduated\" from lung cancer screening.    Informed patient of incidental findings of mild thoracic spine degenerative changes with recommendation to follow up with PCP.    Patient agrees to all recommendations.    Her PCP, Radha APARICIO was notified of results and incidental findings via this communication.    [facility] updated and patient sent lung cancer screening result letter.        CT-LUNG CANCER-SCREENING    Result Date: 3/24/2024  3/23/2024 1:09 PM HISTORY/REASON FOR EXAM:  Lung Cancer Screening. Former smoker.  26 pack-year history.  Shortness of breath. TECHNIQUE/EXAM DESCRIPTION AND NUMBER OF VIEWS: Lung cancer screening without contrast. Low dose noncontrast helical images were obtained of the chest from the lung apices through the costophrenic sulci utilizing thin collimation and intervals with reconstructed images sent to PACS in axial, coronal and sagittal planes. Low dose optimization technique was utilized for this CT exam including automated exposure control and adjustment of the mA and/or kV according to patient size. COMPARISON: None. FINDINGS: Lungs: No nodules or airspace process. Mediastinum/Carissa: No significant adenopathy. Pleura: No pleural effusion. Cardiac: Heart normal in size without pericardial effusion. There is no coronary artery calcification. Vascular: Unremarkable. Soft tissues: Unremarkable. Bones: Mild degenerative change of thoracic spine.     No acute cardiopulmonary disease. No suspicious pulmonary nodule or gross adenopathy. Lung RADS: 1 - Negative: No nodules and definitely benign nodules Findings: no lung nodules nodule(s) with specific " calcifications: complete; central; popcorn; concentric rings and fat containing nodules Management: Continue annual screening with LDCT in 12 months

## 2024-04-11 PROCEDURE — RXMED WILLOW AMBULATORY MEDICATION CHARGE: Performed by: STUDENT IN AN ORGANIZED HEALTH CARE EDUCATION/TRAINING PROGRAM

## 2024-04-12 ENCOUNTER — PHARMACY VISIT (OUTPATIENT)
Dept: PHARMACY | Facility: MEDICAL CENTER | Age: 49
End: 2024-04-12
Payer: COMMERCIAL

## 2024-04-25 DIAGNOSIS — G47.00 INSOMNIA, UNSPECIFIED TYPE: ICD-10-CM

## 2024-04-25 DIAGNOSIS — F51.01 PRIMARY INSOMNIA: ICD-10-CM

## 2024-04-25 RX ORDER — ESZOPICLONE 1 MG/1
.5-1 TABLET, FILM COATED ORAL
Qty: 30 TABLET | Refills: 0 | Status: SHIPPED | OUTPATIENT
Start: 2024-04-25 | End: 2024-05-25

## 2024-04-25 NOTE — TELEPHONE ENCOUNTER
pt requesting refills can you send it over since she and Frank are out for the rest of the week     Received request via: Patient    Was the patient seen in the last year in this department? Yes    Does the patient have an active prescription (recently filled or refills available) for medication(s) requested? No    Pharmacy Name: [id number]    Does the patient have longterm Plus and need 100 day supply (blood pressure, diabetes and cholesterol meds only)? Medication is not for cholesterol, blood pressure or diabetes

## 2024-05-03 ENCOUNTER — APPOINTMENT (OUTPATIENT)
Dept: BEHAVIORAL HEALTH | Facility: CLINIC | Age: 49
End: 2024-05-03
Payer: COMMERCIAL

## 2024-05-03 DIAGNOSIS — F12.90 CANNABIS USE DISORDER: ICD-10-CM

## 2024-05-03 DIAGNOSIS — F41.1 GENERALIZED ANXIETY DISORDER: ICD-10-CM

## 2024-05-03 DIAGNOSIS — F51.01 PRIMARY INSOMNIA: ICD-10-CM

## 2024-05-03 DIAGNOSIS — F31.81 BIPOLAR 2 DISORDER (HCC): ICD-10-CM

## 2024-05-03 PROCEDURE — 99214 OFFICE O/P EST MOD 30 MIN: CPT | Performed by: PSYCHIATRY & NEUROLOGY

## 2024-05-03 RX ORDER — ESZOPICLONE 1 MG/1
.5-1 TABLET, FILM COATED ORAL
Qty: 30 TABLET | Refills: 0 | Status: SHIPPED | OUTPATIENT
Start: 2024-06-23 | End: 2024-07-23

## 2024-05-03 RX ORDER — ESCITALOPRAM OXALATE 10 MG/1
10 TABLET ORAL EVERY MORNING
Qty: 90 TABLET | Refills: 1 | Status: SHIPPED | OUTPATIENT
Start: 2024-05-03

## 2024-05-03 RX ORDER — ESZOPICLONE 1 MG/1
.5-1 TABLET, FILM COATED ORAL
Qty: 30 TABLET | Refills: 0 | Status: SHIPPED | OUTPATIENT
Start: 2024-07-23 | End: 2024-08-22

## 2024-05-03 RX ORDER — CARIPRAZINE 3 MG/1
3 CAPSULE, GELATIN COATED ORAL EVERY EVENING
Qty: 90 CAPSULE | Refills: 1 | Status: SHIPPED | OUTPATIENT
Start: 2024-05-03

## 2024-05-03 RX ORDER — ESZOPICLONE 1 MG/1
.5-1 TABLET, FILM COATED ORAL
Qty: 30 TABLET | Refills: 0 | Status: SHIPPED | OUTPATIENT
Start: 2024-05-24 | End: 2024-06-23
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
## 2024-05-07 PROCEDURE — RXMED WILLOW AMBULATORY MEDICATION CHARGE: Performed by: STUDENT IN AN ORGANIZED HEALTH CARE EDUCATION/TRAINING PROGRAM

## 2024-05-08 ENCOUNTER — PHARMACY VISIT (OUTPATIENT)
Dept: PHARMACY | Facility: MEDICAL CENTER | Age: 49
End: 2024-05-08
Payer: COMMERCIAL

## 2024-05-24 ENCOUNTER — HOSPITAL ENCOUNTER (OUTPATIENT)
Dept: LAB | Facility: MEDICAL CENTER | Age: 49
End: 2024-05-24
Attending: INTERNAL MEDICINE
Payer: COMMERCIAL

## 2024-05-24 LAB
BASOPHILS # BLD AUTO: 0.9 % (ref 0–1.8)
BASOPHILS # BLD: 0.06 K/UL (ref 0–0.12)
EOSINOPHIL # BLD AUTO: 0.06 K/UL (ref 0–0.51)
EOSINOPHIL NFR BLD: 0.9 % (ref 0–6.9)
ERYTHROCYTE [DISTWIDTH] IN BLOOD BY AUTOMATED COUNT: 42.9 FL (ref 35.9–50)
ESTRADIOL SERPL-MCNC: 26.7 PG/ML
FSH SERPL-ACNC: 68.7 MIU/ML
HCT VFR BLD AUTO: 45.9 % (ref 37–47)
HGB BLD-MCNC: 15.7 G/DL (ref 12–16)
IMM GRANULOCYTES # BLD AUTO: 0.01 K/UL (ref 0–0.11)
IMM GRANULOCYTES NFR BLD AUTO: 0.2 % (ref 0–0.9)
LH SERPL-ACNC: 41.6 IU/L
LYMPHOCYTES # BLD AUTO: 1.81 K/UL (ref 1–4.8)
LYMPHOCYTES NFR BLD: 27.5 % (ref 22–41)
MCH RBC QN AUTO: 32.8 PG (ref 27–33)
MCHC RBC AUTO-ENTMCNC: 34.2 G/DL (ref 32.2–35.5)
MCV RBC AUTO: 95.8 FL (ref 81.4–97.8)
MONOCYTES # BLD AUTO: 0.42 K/UL (ref 0–0.85)
MONOCYTES NFR BLD AUTO: 6.4 % (ref 0–13.4)
NEUTROPHILS # BLD AUTO: 4.22 K/UL (ref 1.82–7.42)
NEUTROPHILS NFR BLD: 64.1 % (ref 44–72)
NRBC # BLD AUTO: 0 K/UL
NRBC BLD-RTO: 0 /100 WBC (ref 0–0.2)
PLATELET # BLD AUTO: 329 K/UL (ref 164–446)
PMV BLD AUTO: 9.4 FL (ref 9–12.9)
RBC # BLD AUTO: 4.79 M/UL (ref 4.2–5.4)
T3FREE SERPL-MCNC: 3 PG/ML (ref 2–4.4)
T4 FREE SERPL-MCNC: 1.19 NG/DL (ref 0.93–1.7)
TSH SERPL DL<=0.005 MIU/L-ACNC: 0.05 UIU/ML (ref 0.38–5.33)
WBC # BLD AUTO: 6.6 K/UL (ref 4.8–10.8)

## 2024-05-29 LAB
SHBG SERPL-SCNC: 31 NMOL/L (ref 17–125)
TESTOST FREE SERPL-MCNC: 38.7 PG/ML (ref 1.1–5.8)
TESTOST SERPL-MCNC: 214 NG/DL (ref 9–55)

## 2024-06-05 PROCEDURE — RXMED WILLOW AMBULATORY MEDICATION CHARGE: Performed by: STUDENT IN AN ORGANIZED HEALTH CARE EDUCATION/TRAINING PROGRAM

## 2024-06-06 ENCOUNTER — PHARMACY VISIT (OUTPATIENT)
Dept: PHARMACY | Facility: MEDICAL CENTER | Age: 49
End: 2024-06-06
Payer: COMMERCIAL

## 2024-06-28 ENCOUNTER — TELEPHONE (OUTPATIENT)
Dept: PHARMACY | Facility: MEDICAL CENTER | Age: 49
End: 2024-06-28
Payer: COMMERCIAL

## 2024-06-28 PROCEDURE — RXMED WILLOW AMBULATORY MEDICATION CHARGE: Performed by: STUDENT IN AN ORGANIZED HEALTH CARE EDUCATION/TRAINING PROGRAM

## 2024-06-28 NOTE — TELEPHONE ENCOUNTER
Contact:           Phone number: [phone]   Name of person spoken with and relationship to patient: Earnestine Pete [id number]  Medication:  Patient’s Adherence:           How patient is doing on medication: well   How many missed doses and reason: 0   Any new medications: no   Any new conditions: no   Any new allergies: no   Any new side effects: no    Any new diagnoses: no    How many doses remaining:    Did patient want to speak with pharmacist: no  Delivery:           Delivery date and method: 7/2/24 via    Needs by Date: 7/18   Signature required: no   Any additional details for : OLIVER  Teach Appointment Date: NA  Shipping Address: [address] [place] [id number]  Medication(name, strength and dose): EMGALITY 120MG  Copay: $0  Payment Method: NA  Supplies:   Additional Information: NO QUESTIONS OR CONCERNS AT THIS TIME

## 2024-07-02 ENCOUNTER — PHARMACY VISIT (OUTPATIENT)
Dept: PHARMACY | Facility: MEDICAL CENTER | Age: 49
End: 2024-07-02
Payer: COMMERCIAL

## 2024-07-30 PROCEDURE — RXMED WILLOW AMBULATORY MEDICATION CHARGE: Performed by: STUDENT IN AN ORGANIZED HEALTH CARE EDUCATION/TRAINING PROGRAM

## 2024-07-31 ENCOUNTER — PHARMACY VISIT (OUTPATIENT)
Dept: PHARMACY | Facility: MEDICAL CENTER | Age: 49
End: 2024-07-31
Payer: COMMERCIAL

## 2024-08-15 ENCOUNTER — OFFICE VISIT (OUTPATIENT)
Dept: MEDICAL GROUP | Facility: IMAGING CENTER | Age: 49
End: 2024-08-15
Payer: COMMERCIAL

## 2024-08-15 VITALS
WEIGHT: 145 LBS | TEMPERATURE: 98.7 F | BODY MASS INDEX: 24.75 KG/M2 | HEART RATE: 85 BPM | HEIGHT: 64 IN | RESPIRATION RATE: 16 BRPM | OXYGEN SATURATION: 95 % | SYSTOLIC BLOOD PRESSURE: 110 MMHG | DIASTOLIC BLOOD PRESSURE: 72 MMHG

## 2024-08-15 DIAGNOSIS — J30.89 ENVIRONMENTAL AND SEASONAL ALLERGIES: ICD-10-CM

## 2024-08-15 DIAGNOSIS — J30.81 ALLERGIC RHINITIS DUE TO ANIMAL (CAT) (DOG) HAIR AND DANDER: ICD-10-CM

## 2024-08-15 DIAGNOSIS — J45.909 EXTRINSIC ASTHMA WITHOUT COMPLICATION, UNSPECIFIED ASTHMA SEVERITY, UNSPECIFIED WHETHER PERSISTENT: ICD-10-CM

## 2024-08-15 PROCEDURE — 3074F SYST BP LT 130 MM HG: CPT

## 2024-08-15 PROCEDURE — 3078F DIAST BP <80 MM HG: CPT

## 2024-08-15 PROCEDURE — 99213 OFFICE O/P EST LOW 20 MIN: CPT

## 2024-08-15 PROCEDURE — 1126F AMNT PAIN NOTED NONE PRSNT: CPT

## 2024-08-15 RX ORDER — TRIAMCINOLONE ACETONIDE 40 MG/ML
20 INJECTION, SUSPENSION INTRA-ARTICULAR; INTRAMUSCULAR ONCE
Status: COMPLETED | OUTPATIENT
Start: 2024-08-15 | End: 2024-08-15

## 2024-08-15 RX ORDER — ALBUTEROL SULFATE 90 UG/1
2 AEROSOL, METERED RESPIRATORY (INHALATION) EVERY 4 HOURS PRN
Qty: 1 EACH | Refills: 3 | Status: SHIPPED | OUTPATIENT
Start: 2024-08-15

## 2024-08-15 RX ORDER — TRIAMCINOLONE ACETONIDE 40 MG/ML
40 INJECTION, SUSPENSION INTRA-ARTICULAR; INTRAMUSCULAR ONCE
Status: COMPLETED | OUTPATIENT
Start: 2024-08-15 | End: 2024-08-15

## 2024-08-15 RX ADMIN — TRIAMCINOLONE ACETONIDE 40 MG: 40 INJECTION, SUSPENSION INTRA-ARTICULAR; INTRAMUSCULAR at 13:13

## 2024-08-15 RX ADMIN — TRIAMCINOLONE ACETONIDE 20 MG: 40 INJECTION, SUSPENSION INTRA-ARTICULAR; INTRAMUSCULAR at 13:13

## 2024-08-15 ASSESSMENT — PAIN SCALES - GENERAL: PAINLEVEL: NO PAIN

## 2024-08-15 NOTE — PROGRESS NOTES
Subjective:     CC:   Chief Complaint   Patient presents with    Medication Management     Inhaler RX     Injections     Kenalog shot        HPI:   Earnestine presents today to discuss:    Allergic rhinitis due to animal hair and dander  Allergy induced asthma  Established condition.  Patient takes OTC antihistamines daily.  However, patient will be visiting her daughter who has 4 cats.  She is requesting Kenalog shot and prescription for albuterol inhaler to be used as needed in case of asthma flareup.        Past Medical History:   Diagnosis Date    Anxiety     Asthma     inhalers    Bipolar 2 disorder ([facility])     depression , anxiety    Bipolar disorder ([facility]) 11/11/2020    Cancer ([facility]) 1996    cervical    Depression     Hypothyroidism 11/11/2020    Migraine     Nephrolithiasis 11/11/2020    Pain     back    Seizure ([facility]) 2013    takes po meds    Stroke ([facility]) 2013    TIA    Urinary incontinence 11/11/2020     Family History   Problem Relation Age of Onset    Kidney stones Brother     Stroke Mother     Diabetes Father     Hypertension Father     Hyperlipidemia Father     Kidney Disease Neg Hx      Past Surgical History:   Procedure Laterality Date    [place] CYSTOSCOPY,INSERT URETERAL STENT Right 12/09/2020    Procedure: CYSTOSCOPY, WITH URETERAL STENT INSERTION;  Surgeon: Dorian Estrada M.D.;  Location: SURGERY [facility];  Service: Urology    [place] CYSTO/URETERO/PYELOSCOPY, DX Right 12/09/2020    Procedure: URETEROSCOPY;  Surgeon: Dorian Estrada M.D.;  Location: SURGERY [facility];  Service: Urology    CHOLECYSTECTOMY  2009    ABDOMINAL HYSTERECTOMY TOTAL  2002    EXPLORATORY LAPAROTOMY      HAND SURGERY      3 x right hand, 1x left    LITHOTRIPSY Right 2014 and 2015    kidney stone    LYSIS ADHESIONS GENERAL      OOPHORECTOMY  2003, 2004,    ovarian cysct removal     PRIMARY C SECTION       Social History     Tobacco Use    Smoking status: Former     Current packs/day: 0.00     Average packs/day: 2.0 packs/day  for 18.7 years (37.3 ttl pk-yrs)     Types: Cigarettes     Start date: 1991     Quit date: 2010     Years since quittin.6    Smokeless tobacco: Never   Vaping Use    Vaping status: Never Used   Substance Use Topics    Alcohol use: Not Currently     Comment: Used to drink, stopped comlpetely     Drug use: Yes     Types: Marijuana, Inhaled, Oral     Comment: Mirella      Social History     Social History Narrative    Works as      Current Outpatient Medications Ordered in Epic   Medication Sig Dispense Refill    albuterol 108 (90 Base) MCG/ACT Aero Soln inhalation aerosol Inhale 2 Puffs every four hours as needed for Shortness of Breath. 1 Each 3    eszopiclone (LUNESTA) 1 MG Tab tablet Take 0.5-1 Tablets by mouth at bedtime as needed for Insomnia for up to 30 days. Indications: Trouble Sleeping 30 Tablet 0    escitalopram (LEXAPRO) 10 MG Tab Take 1 Tablet by mouth every morning. 90 Tablet 1    VRAYLAR 3 MG capsule Take 1 Capsule by mouth every evening. 90 Capsule 1    lamotrigine (LAMICTAL) 200 MG tablet Take 1 Tablet by mouth 2 times a day for 360 days. 180 Tablet 3    ondansetron (ZOFRAN ODT) 4 MG TABLET DISPERSIBLE Take 1 Tablet by mouth every 8 hours as needed for Nausea/Vomiting. 10 Tablet 2    Galcanezumab-gnlm (EMGALITY) 120 MG/ML Solution Auto-injector Inject 120 mg under the skin every 30 DAYS. 1 mL 11    hydroCHLOROthiazide 25 MG Tab TAKE 1 TABLET BY MOUTH TWICE A  Tablet 3    triamcinolone acetonide (KENALOG) 0.1 % Cream Apply 1 Application topically 2 times a day. 30 g 3    liothyronine (CYTOMEL) 5 MCG Tab Take 10 mcg by mouth every day. Take 1 tablet by mouth every day on an empty stomach.      Potassium Citrate 15 MEQ (1620 MG) Tab CR TAKE 2 TABLETS BY MOUTH TWICE A  Tablet 3    phentermine (ADIPEX-P) 37.5 MG tablet Take 37.5 mg by mouth every day.      testosterone cypionate (DEPO-TESTOSTERONE) 200 MG/ML Solution injection Inject 0.25 mL into the  "shoulder, thigh, or buttocks every 7 days.      levothyroxine (SYNTHROID) 75 MCG Tab 1 Tablet.       No current Epic-ordered facility-administered medications on file.     Promethazine    ROS: see hpi  Gen: no fevers/chills  Pulm: no sob, no cough  CV: no chest pain, no palpitations, no edema  GI: no nausea/vomiting, no diarrhea  Skin: no rash    Objective:   Exam:  /72 (BP Location: Right arm, Patient Position: Sitting, BP Cuff Size: Adult)   Pulse 85   Temp 37.1 °C (98.7 °F) (Temporal)   Resp 16   Ht 1.626 m (5' 4\")   Wt 65.8 kg (145 lb)   SpO2 95%   BMI 24.89 kg/m²    Body mass index is 24.89 kg/m².    Gen: Alert and oriented, No apparent distress.  HEENT: Head atraumatic, normocephalic. Pupils equal and round.  Neck: Neck is supple without lymphadenopathy.   Lungs: Normal effort, CTA bilaterally, no wheezes, rhonchi, or rales  CV: Regular rate and rhythm. No murmurs, rubs, or gallops.  ABD: +BS. Non-tender, non-distended. No rebound, rigidity, or guarding.  Ext: No clubbing, cyanosis, edema.    Assessment & Plan:     49 y.o. female with the following -   1. Environmental and seasonal allergies  triamcinolone acetonide (Kenalog-40) injection 20 mg    triamcinolone acetonide (Kenalog-40) injection 40 mg    albuterol 108 (90 Base) MCG/ACT Aero Soln inhalation aerosol      2. Allergic rhinitis due to animal (cat) (dog) hair and dander  triamcinolone acetonide (Kenalog-40) injection 20 mg    triamcinolone acetonide (Kenalog-40) injection 40 mg    albuterol 108 (90 Base) MCG/ACT Aero Soln inhalation aerosol      3. Extrinsic asthma without complication, unspecified asthma severity, unspecified whether persistent  albuterol 108 (90 Base) MCG/ACT Aero Soln inhalation aerosol        Medical Decision Making/Course:  In the course of preparing for this visit with review of the pertinent past medical history, recent and past clinic visits, current medications, and performing chart, immunization, medical history " and medication reconciliation, and in the further course of obtaining the current history pertinent to the clinic visit today, performing an exam and evaluation, ordering and independently evaluating labs, tests, and/or procedures, prescribing any recommended new medications as noted above, providing any pertinent counseling and education and recommending further coordination of care. This was discussed with patient in a shared-decision making conversation, and they understand and agreed with plan of care.     Return if symptoms worsen or fail to improve.    GLENN Whiting.   [facility]    Please note that this dictation was created using voice recognition software. I have made every reasonable attempt to correct obvious errors, but I expect that there are errors of grammar and possibly content that I did not discover before finalizing the note.

## 2024-08-28 ENCOUNTER — OFFICE VISIT (OUTPATIENT)
Dept: BEHAVIORAL HEALTH | Facility: CLINIC | Age: 49
End: 2024-08-28
Payer: COMMERCIAL

## 2024-08-28 VITALS
HEIGHT: 64 IN | HEART RATE: 87 BPM | WEIGHT: 145 LBS | SYSTOLIC BLOOD PRESSURE: 102 MMHG | DIASTOLIC BLOOD PRESSURE: 66 MMHG | OXYGEN SATURATION: 100 % | BODY MASS INDEX: 24.75 KG/M2

## 2024-08-28 DIAGNOSIS — G40.909 SEIZURE DISORDER (HCC): ICD-10-CM

## 2024-08-28 DIAGNOSIS — Z79.899 HIGH RISK MEDICATION USE: ICD-10-CM

## 2024-08-28 DIAGNOSIS — F31.81 BIPOLAR 2 DISORDER (HCC): ICD-10-CM

## 2024-08-28 DIAGNOSIS — F41.1 GENERALIZED ANXIETY DISORDER: ICD-10-CM

## 2024-08-28 DIAGNOSIS — Z79.899 LONG TERM CURRENT USE OF ANTIPSYCHOTIC MEDICATION: ICD-10-CM

## 2024-08-28 DIAGNOSIS — F51.04 CHRONIC INSOMNIA: ICD-10-CM

## 2024-08-28 RX ORDER — METFORMIN HCL 500 MG
500 TABLET, EXTENDED RELEASE 24 HR ORAL DAILY
Qty: 30 TABLET | Refills: 0 | Status: SHIPPED | OUTPATIENT
Start: 2024-08-28

## 2024-08-28 RX ORDER — ESCITALOPRAM OXALATE 10 MG/1
10 TABLET ORAL EVERY MORNING
Qty: 90 TABLET | Refills: 1 | Status: SHIPPED | OUTPATIENT
Start: 2024-08-28

## 2024-08-28 RX ORDER — LAMOTRIGINE 200 MG/1
200 TABLET ORAL 2 TIMES DAILY
Qty: 180 TABLET | Refills: 3 | Status: SHIPPED | OUTPATIENT
Start: 2024-08-28 | End: 2025-08-23

## 2024-08-28 RX ORDER — ARIPIPRAZOLE 5 MG/1
5 TABLET ORAL DAILY
Qty: 30 TABLET | Refills: 0 | Status: SHIPPED | OUTPATIENT
Start: 2024-08-28 | End: 2024-09-27

## 2024-08-28 RX ORDER — ESZOPICLONE 1 MG/1
1 TABLET, FILM COATED ORAL
Qty: 30 TABLET | Refills: 0 | Status: SHIPPED | OUTPATIENT
Start: 2024-08-28 | End: 2024-09-27

## 2024-08-28 ASSESSMENT — ENCOUNTER SYMPTOMS
TINGLING: 0
SHORTNESS OF BREATH: 0
NAUSEA: 0
RESPIRATORY NEGATIVE: 1
DIARRHEA: 0
WEAKNESS: 0
CONSTIPATION: 0
HEADACHES: 0
CARDIOVASCULAR NEGATIVE: 1
CONSTITUTIONAL NEGATIVE: 1
GASTROINTESTINAL NEGATIVE: 1
VOMITING: 0

## 2024-08-28 ASSESSMENT — PATIENT HEALTH QUESTIONNAIRE - PHQ9
SUM OF ALL RESPONSES TO PHQ QUESTIONS 1-9: 4
5. POOR APPETITE OR OVEREATING: 0 - NOT AT ALL
CLINICAL INTERPRETATION OF PHQ2 SCORE: 0

## 2024-08-28 ASSESSMENT — ANXIETY QUESTIONNAIRES
IF YOU CHECKED OFF ANY PROBLEMS ON THIS QUESTIONNAIRE, HOW DIFFICULT HAVE THESE PROBLEMS MADE IT FOR YOU TO DO YOUR WORK, TAKE CARE OF THINGS AT HOME, OR GET ALONG WITH OTHER PEOPLE: SOMEWHAT DIFFICULT
3. WORRYING TOO MUCH ABOUT DIFFERENT THINGS: NOT AT ALL
1. FEELING NERVOUS, ANXIOUS, OR ON EDGE: SEVERAL DAYS
2. NOT BEING ABLE TO STOP OR CONTROL WORRYING: NOT AT ALL
7. FEELING AFRAID AS IF SOMETHING AWFUL MIGHT HAPPEN: NOT AT ALL
6. BECOMING EASILY ANNOYED OR IRRITABLE: NOT AT ALL
5. BEING SO RESTLESS THAT IT IS HARD TO SIT STILL: NOT AT ALL
GAD7 TOTAL SCORE: 2
4. TROUBLE RELAXING: SEVERAL DAYS
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
## 2024-08-29 NOTE — ASSESSMENT & PLAN NOTE
Problem type: Chronic Illness, Stable    Assessment: May be component of bipolar and/or PTSD. Has successfully reduced dose of Lunesta in the past year.    Plan  Medication:   -Continue Lunesta 1mg    Therapy:   -Consider CBT-i    Other Orders: none

## 2024-08-29 NOTE — ASSESSMENT & PLAN NOTE
Problem type: Chronic Illness with exacerbation, progression, or side effects of treatment    Assessment: 49 year old female with hx of bipolar II who presents for transfer of care. Past history of stabilization on lithium but was taken off in 2022 due to kidney disease. No concerns for acute mood episodes at this time; will continue Lamictal and Lexapro. Concern for EPS from Vraylar which warrants switch to Abilify given less dopamine antagonism. Will also start Metformin ER 500mg to prevent weight gain/metabolic side effects associated with long term antipsychotic use. Ongoing cannabis use which will be addressed at future visits. Plan to follow up in 4 weeks.    Plan  Medication:   -Continue Lamictal 400mg PO daily (on current dose for epilepsy)  -Discontinue Vraylar 3mg  -Start Abilify 5mg PO daily  -Start Metformin ER 500mg PO daily  -Continue Lexapro 10mg PO daily    Therapy:   -Consider long-term psychotherapy    Other Orders: none

## 2024-08-29 NOTE — ASSESSMENT & PLAN NOTE
Problem type: Chronic Illness, Stable    Assessment: Anxiety remains well-controlled on Lexapro but given comorbid PTSD, patient would likely benefit substantially from psychotherapy and reduction of cannabis use.    Plan  Medication:   -Continue Lexapro 10mg    Therapy:   -Consider psychotherapy    Other Orders: none

## 2024-09-11 DIAGNOSIS — N20.0 NEPHROLITHIASIS: ICD-10-CM

## 2024-09-11 RX ORDER — POTASSIUM CITRATE 15 MEQ/1
2 TABLET, EXTENDED RELEASE ORAL 2 TIMES DAILY
Qty: 360 TABLET | Refills: 3 | Status: SHIPPED | OUTPATIENT
Start: 2024-09-11

## 2024-09-12 ENCOUNTER — TELEPHONE (OUTPATIENT)
Dept: NEUROLOGY | Facility: MEDICAL CENTER | Age: 49
End: 2024-09-12
Payer: COMMERCIAL

## 2024-09-12 DIAGNOSIS — G43.E11 INTRACTABLE CHRONIC MIGRAINE WITH AURA WITH STATUS MIGRAINOSUS: ICD-10-CM

## 2024-09-12 NOTE — TELEPHONE ENCOUNTER
Prior Authorization for Emgality has been denied for a quantity of 1ml , day supply 30    Prior authorization was denied per the following: Med not on plans formulary.Use alts: Aimovig; Ajovy        Prior Authorization denial reference number: [id number]  Insurance: [facility]      Did patients PA renewal and now the Emgality is not covered on her plan. They will cover the other injections. Would you consider changing it to one of those?     Thanks,    Carolyne  Rx Coordinator

## 2024-09-13 ENCOUNTER — TELEPHONE (OUTPATIENT)
Dept: PHARMACY | Facility: MEDICAL CENTER | Age: 49
End: 2024-09-13
Payer: COMMERCIAL

## 2024-09-13 RX ORDER — ERENUMAB-AOOE 140 MG/ML
140 INJECTION, SOLUTION SUBCUTANEOUS
Qty: 1 ML | Refills: 11 | Status: SHIPPED | OUTPATIENT
Start: 2024-09-13 | End: 2025-09-08

## 2024-09-13 NOTE — TELEPHONE ENCOUNTER
Received New Start PA request via [employer]  for (AIMOVIG) 140 MG/ML Solution Auto-injector . (Quantity:1, Day Supply:30)     Insurance: [employer]  Member ID:  [id number]  BIN: [id number]  [facility]  [facility]     Ran Test claim via [place] & medication Rejects stating prior authorization is required.

## 2024-09-13 NOTE — TELEPHONE ENCOUNTER
Prior Authorization for     (AIMOVIG) 140 MG/ML Solution Auto-injector    (Quantity: 1, Days: 30) has been submitted via Cover My Meds: Key ([id number])    Insurance: [employer]    Will follow up in 24-48 business hours.

## 2024-09-13 NOTE — TELEPHONE ENCOUNTER
Refilled the following medication(s) below and sent it to the following pharmacy:      Requested Prescriptions     Signed Prescriptions Disp Refills    Erenumab-aooe (AIMOVIG) 140 MG/ML Solution Auto-injector 1 mL 11     Sig: Inject 140 mg under the skin every 30 (thirty) days for 360 days.     Authorizing Provider: REAGAN BRUMFIELD          [facility]/pharmacy #[id number] - [place], [place] - [address]  [address] [place] [address]  Phone: [phone] Fax: [phone]              Reagan Brumfield MD  Department of Neurology at [facility]  General Neurologist and Epileptologist  Director of [facility]'s Level III Comprehensive Epilepsy Program  Professor of Clinical Neurology, [facility].   Phone: [phone]  Fax: [phone]  E-mail: [email or web address]

## 2024-09-16 ENCOUNTER — DOCUMENTATION (OUTPATIENT)
Dept: PHARMACY | Facility: MEDICAL CENTER | Age: 49
End: 2024-09-16
Payer: COMMERCIAL

## 2024-09-16 PROCEDURE — RXMED WILLOW AMBULATORY MEDICATION CHARGE: Performed by: STUDENT IN AN ORGANIZED HEALTH CARE EDUCATION/TRAINING PROGRAM

## 2024-09-16 NOTE — TELEPHONE ENCOUNTER
Prior Authorization for (AIMOVIG) 140 MG/ML Solution Auto-injector  has been approved for a quantity of 1 , day supply 30    Prior Authorization reference number: [id number]  Insurance: [employer]  Effective dates: 09/13/2024 to 09/13/2025  Copay: $40     Is patient eligible to fill with [facility]? Yes    Next Steps: The patient's copay exceeds $5.00. Proceed with contacting patient to offer financial assistance.

## 2024-09-16 NOTE — TELEPHONE ENCOUNTER
Medication:     (AIMOVIG) 140 MG/ML Solution Auto-injector     Type of Insurance: Commercial  Type of Financial assistance requested Copay Card  Source: [email or web address]/  Source Phone #: [phone]  Outcome: Approved  Effective dates: 09/16/2024 until 12/31/2024  Details/Billing Information:   [id number]  PCN: [id number]  GRP: [id number]  ID: [id number]  Max Award Amount: $3500  Final Copay: $5

## 2024-09-16 NOTE — PROGRESS NOTES
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 We will assist as needed for any questions or concerns.    Med not clinically followed by [facility] team

## 2024-09-17 ENCOUNTER — PHARMACY VISIT (OUTPATIENT)
Dept: PHARMACY | Facility: MEDICAL CENTER | Age: 49
End: 2024-09-17
Payer: COMMERCIAL

## 2024-09-24 DIAGNOSIS — Z79.899 LONG TERM CURRENT USE OF ANTIPSYCHOTIC MEDICATION: ICD-10-CM

## 2024-09-24 DIAGNOSIS — Z79.899 HIGH RISK MEDICATION USE: ICD-10-CM

## 2024-09-24 DIAGNOSIS — F31.81 BIPOLAR 2 DISORDER (HCC): ICD-10-CM

## 2024-09-25 ENCOUNTER — OFFICE VISIT (OUTPATIENT)
Dept: BEHAVIORAL HEALTH | Facility: CLINIC | Age: 49
End: 2024-09-25
Payer: COMMERCIAL

## 2024-09-25 DIAGNOSIS — G40.909 SEIZURE DISORDER (HCC): ICD-10-CM

## 2024-09-25 DIAGNOSIS — F51.04 CHRONIC INSOMNIA: ICD-10-CM

## 2024-09-25 DIAGNOSIS — F41.1 GENERALIZED ANXIETY DISORDER: ICD-10-CM

## 2024-09-25 DIAGNOSIS — Z79.899 HIGH RISK MEDICATION USE: ICD-10-CM

## 2024-09-25 DIAGNOSIS — F31.81 BIPOLAR 2 DISORDER (HCC): ICD-10-CM

## 2024-09-25 DIAGNOSIS — Z79.899 LONG TERM CURRENT USE OF ANTIPSYCHOTIC MEDICATION: ICD-10-CM

## 2024-09-25 RX ORDER — METFORMIN HCL 500 MG
500 TABLET, EXTENDED RELEASE 24 HR ORAL DAILY
Qty: 90 TABLET | Refills: 0 | Status: SHIPPED | OUTPATIENT
Start: 2024-09-25 | End: 2024-12-24

## 2024-09-25 RX ORDER — ARIPIPRAZOLE 2 MG/1
2 TABLET ORAL DAILY
Qty: 30 TABLET | Refills: 0 | Status: SHIPPED | OUTPATIENT
Start: 2024-09-25 | End: 2024-10-25

## 2024-09-25 RX ORDER — ESCITALOPRAM OXALATE 10 MG/1
10 TABLET ORAL EVERY MORNING
Qty: 90 TABLET | Refills: 1 | Status: SHIPPED | OUTPATIENT
Start: 2024-09-25

## 2024-09-25 RX ORDER — ARIPIPRAZOLE 5 MG/1
5 TABLET ORAL
Qty: 30 TABLET | Refills: 0 | OUTPATIENT
Start: 2024-09-25

## 2024-09-25 RX ORDER — METFORMIN HCL 500 MG
500 TABLET, EXTENDED RELEASE 24 HR ORAL DAILY
Qty: 30 TABLET | Refills: 0 | OUTPATIENT
Start: 2024-09-25

## 2024-09-25 RX ORDER — ESZOPICLONE 1 MG/1
1 TABLET, FILM COATED ORAL
Qty: 30 TABLET | Refills: 0 | Status: SHIPPED | OUTPATIENT
Start: 2024-09-28 | End: 2024-10-28

## 2024-09-25 RX ORDER — LAMOTRIGINE 200 MG/1
200 TABLET ORAL 2 TIMES DAILY
Qty: 180 TABLET | Refills: 3 | Status: SHIPPED | OUTPATIENT
Start: 2024-09-25 | End: 2025-09-20

## 2024-09-25 ASSESSMENT — ENCOUNTER SYMPTOMS
SHORTNESS OF BREATH: 0
DIARRHEA: 0
CONSTIPATION: 0
CARDIOVASCULAR NEGATIVE: 1
NEUROLOGICAL NEGATIVE: 1
NAUSEA: 0
HEADACHES: 0
WEAKNESS: 0
TINGLING: 0
CONSTITUTIONAL NEGATIVE: 1
RESPIRATORY NEGATIVE: 1
GASTROINTESTINAL NEGATIVE: 1
VOMITING: 0
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
## 2024-09-26 NOTE — ASSESSMENT & PLAN NOTE
Problem type: Chronic Illness with exacerbation, progression, or side effects of treatment    Assessment: 49 year old female with hx of bipolar II who presents for follow up. Past history of stabilization on lithium but was taken off in 2022 due to acquired thyroid & kidney disease. No concerns for acute mood episodes at this time; will continue Lamictal and Lexapro. At last appointment, there was concern for EPS from Vraylar warranting direct switch to Abilify given less dopamine antagonism; also started Metformin ER 500mg to prevent weight gain/metabolic side effects associated with long term antipsychotic use. Since last appointment patient denies any changes in mood nor physical symptoms which are inconsistent with typical EPS at this time; will continue to monitor and reassess. Ongoing cannabis use which was discussed with patient; patient pre-contemplative at this time. Plan to make medication changes as outlined below and follow up in 4 weeks.    Plan  Medication:   -Continue Lamictal 400mg PO daily (on current dose for epilepsy)  -Decrease Abilify to 2mg PO daily  -Continue Metformin ER 500mg PO daily  -Continue Lexapro 10mg PO daily    Therapy:   -Consider long-term psychotherapy    Other Orders: none

## 2024-10-04 ENCOUNTER — HOSPITAL ENCOUNTER (OUTPATIENT)
Dept: LAB | Facility: MEDICAL CENTER | Age: 49
End: 2024-10-04
Attending: STUDENT IN AN ORGANIZED HEALTH CARE EDUCATION/TRAINING PROGRAM
Payer: COMMERCIAL

## 2024-10-04 DIAGNOSIS — N25.1 DIABETES INSIPIDUS, NEPHROGENIC (HCC): Chronic | ICD-10-CM

## 2024-10-04 DIAGNOSIS — N18.2 CKD (CHRONIC KIDNEY DISEASE) STAGE 2, GFR 60-89 ML/MIN: ICD-10-CM

## 2024-10-04 DIAGNOSIS — N20.0 NEPHROLITHIASIS: Chronic | ICD-10-CM

## 2024-10-04 LAB
25(OH)D3 SERPL-MCNC: 71 NG/ML (ref 30–100)
ALBUMIN SERPL BCP-MCNC: 4.4 G/DL (ref 3.2–4.9)
ANION GAP SERPL CALC-SCNC: 11 MMOL/L (ref 7–16)
APPEARANCE UR: CLEAR
BACTERIA #/AREA URNS HPF: NEGATIVE /HPF
BASOPHILS # BLD AUTO: 0.9 % (ref 0–1.8)
BASOPHILS # BLD: 0.06 K/UL (ref 0–0.12)
BILIRUB UR QL STRIP.AUTO: NEGATIVE
BUN SERPL-MCNC: 24 MG/DL (ref 8–22)
CALCIUM SERPL-MCNC: 9.7 MG/DL (ref 8.4–10.2)
CHLORIDE SERPL-SCNC: 99 MMOL/L (ref 96–112)
CO2 SERPL-SCNC: 28 MMOL/L (ref 20–33)
COLOR UR: ABNORMAL
CREAT SERPL-MCNC: 1.05 MG/DL (ref 0.5–1.4)
CREAT UR-MCNC: 44.1 MG/DL
CREAT UR-MCNC: 44.4 MG/DL
EOSINOPHIL # BLD AUTO: 0.09 K/UL (ref 0–0.51)
EOSINOPHIL NFR BLD: 1.3 % (ref 0–6.9)
EPI CELLS #/AREA URNS HPF: ABNORMAL /HPF
ERYTHROCYTE [DISTWIDTH] IN BLOOD BY AUTOMATED COUNT: 42.6 FL (ref 35.9–50)
ERYTHROCYTE [DISTWIDTH] IN BLOOD BY AUTOMATED COUNT: 43.3 FL (ref 35.9–50)
ESTRADIOL SERPL-MCNC: 41 PG/ML
FASTING STATUS PATIENT QL REPORTED: NORMAL
FSH SERPL-ACNC: 42.3 MIU/ML
GFR SERPLBLD CREATININE-BSD FMLA CKD-EPI: 64 ML/MIN/1.73 M 2
GLUCOSE SERPL-MCNC: 64 MG/DL (ref 65–99)
GLUCOSE UR STRIP.AUTO-MCNC: NEGATIVE MG/DL
HCT VFR BLD AUTO: 46.9 % (ref 37–47)
HCT VFR BLD AUTO: 48.1 % (ref 37–47)
HGB BLD-MCNC: 15.9 G/DL (ref 12–16)
HGB BLD-MCNC: 16 G/DL (ref 12–16)
IMM GRANULOCYTES # BLD AUTO: 0.02 K/UL (ref 0–0.11)
IMM GRANULOCYTES NFR BLD AUTO: 0.3 % (ref 0–0.9)
KETONES UR STRIP.AUTO-MCNC: NEGATIVE MG/DL
LEUKOCYTE ESTERASE UR QL STRIP.AUTO: ABNORMAL
LH SERPL-ACNC: 17.4 IU/L
LYMPHOCYTES # BLD AUTO: 1.55 K/UL (ref 1–4.8)
LYMPHOCYTES NFR BLD: 22.6 % (ref 22–41)
MCH RBC QN AUTO: 32.5 PG (ref 27–33)
MCH RBC QN AUTO: 32.9 PG (ref 27–33)
MCHC RBC AUTO-ENTMCNC: 33.1 G/DL (ref 32.2–35.5)
MCHC RBC AUTO-ENTMCNC: 34.1 G/DL (ref 32.2–35.5)
MCV RBC AUTO: 96.5 FL (ref 81.4–97.8)
MCV RBC AUTO: 98.4 FL (ref 81.4–97.8)
MICRO URNS: ABNORMAL
MICROALBUMIN UR-MCNC: <1.2 MG/DL
MICROALBUMIN/CREAT UR: NORMAL MG/G (ref 0–30)
MONOCYTES # BLD AUTO: 0.61 K/UL (ref 0–0.85)
MONOCYTES NFR BLD AUTO: 8.9 % (ref 0–13.4)
NEUTROPHILS # BLD AUTO: 4.54 K/UL (ref 1.82–7.42)
NEUTROPHILS NFR BLD: 66 % (ref 44–72)
NITRITE UR QL STRIP.AUTO: NEGATIVE
NRBC # BLD AUTO: 0 K/UL
NRBC BLD-RTO: 0 /100 WBC (ref 0–0.2)
PH UR STRIP.AUTO: 7.5 [PH] (ref 5–8)
PHOSPHATE SERPL-MCNC: 2.4 MG/DL (ref 2.5–4.5)
PLATELET # BLD AUTO: 321 K/UL (ref 164–446)
PLATELET # BLD AUTO: 328 K/UL (ref 164–446)
PMV BLD AUTO: 9.1 FL (ref 9–12.9)
PMV BLD AUTO: 9.2 FL (ref 9–12.9)
POTASSIUM SERPL-SCNC: 3.5 MMOL/L (ref 3.6–5.5)
PROT UR QL STRIP: NEGATIVE MG/DL
PROT UR-MCNC: <6 MG/DL (ref 0–15)
PROT/CREAT UR: 136 MG/G (ref 10–107)
PTH-INTACT SERPL-MCNC: 34 PG/ML (ref 14–72)
RBC # BLD AUTO: 4.86 M/UL (ref 4.2–5.4)
RBC # BLD AUTO: 4.89 M/UL (ref 4.2–5.4)
RBC # URNS HPF: ABNORMAL /HPF
RBC UR QL AUTO: NEGATIVE
SODIUM SERPL-SCNC: 138 MMOL/L (ref 135–145)
SP GR UR STRIP.AUTO: 1.01
T3FREE SERPL-MCNC: 4.6 PG/ML (ref 2–4.4)
T4 FREE SERPL-MCNC: 1.58 NG/DL (ref 0.93–1.7)
TSH SERPL DL<=0.005 MIU/L-ACNC: 0.01 UIU/ML (ref 0.38–5.33)
URATE SERPL-MCNC: 5.3 MG/DL (ref 1.9–8.2)
WBC # BLD AUTO: 6.9 K/UL (ref 4.8–10.8)
WBC # BLD AUTO: 6.9 K/UL (ref 4.8–10.8)
WBC #/AREA URNS HPF: ABNORMAL /HPF

## 2024-10-04 PROCEDURE — 85027 COMPLETE CBC AUTOMATED: CPT

## 2024-10-04 PROCEDURE — 84156 ASSAY OF PROTEIN URINE: CPT

## 2024-10-04 PROCEDURE — 82043 UR ALBUMIN QUANTITATIVE: CPT

## 2024-10-04 PROCEDURE — 84402 ASSAY OF FREE TESTOSTERONE: CPT

## 2024-10-04 PROCEDURE — 82306 VITAMIN D 25 HYDROXY: CPT

## 2024-10-04 PROCEDURE — 36415 COLL VENOUS BLD VENIPUNCTURE: CPT

## 2024-10-04 PROCEDURE — 82570 ASSAY OF URINE CREATININE: CPT

## 2024-10-04 PROCEDURE — 84100 ASSAY OF PHOSPHORUS: CPT

## 2024-10-04 PROCEDURE — 82670 ASSAY OF TOTAL ESTRADIOL: CPT

## 2024-10-04 PROCEDURE — 81001 URINALYSIS AUTO W/SCOPE: CPT

## 2024-10-04 PROCEDURE — 84443 ASSAY THYROID STIM HORMONE: CPT

## 2024-10-04 PROCEDURE — 82157 ASSAY OF ANDROSTENEDIONE: CPT

## 2024-10-04 PROCEDURE — 80048 BASIC METABOLIC PNL TOTAL CA: CPT

## 2024-10-04 PROCEDURE — 83970 ASSAY OF PARATHORMONE: CPT

## 2024-10-04 PROCEDURE — 82626 DEHYDROEPIANDROSTERONE: CPT

## 2024-10-04 PROCEDURE — 85025 COMPLETE CBC W/AUTO DIFF WBC: CPT

## 2024-10-04 PROCEDURE — 84439 ASSAY OF FREE THYROXINE: CPT

## 2024-10-04 PROCEDURE — 84270 ASSAY OF SEX HORMONE GLOBUL: CPT

## 2024-10-04 PROCEDURE — 84481 FREE ASSAY (FT-3): CPT

## 2024-10-04 PROCEDURE — 82627 DEHYDROEPIANDROSTERONE: CPT

## 2024-10-04 PROCEDURE — 83002 ASSAY OF GONADOTROPIN (LH): CPT

## 2024-10-04 PROCEDURE — 84550 ASSAY OF BLOOD/URIC ACID: CPT

## 2024-10-04 PROCEDURE — 83001 ASSAY OF GONADOTROPIN (FSH): CPT

## 2024-10-04 PROCEDURE — 84403 ASSAY OF TOTAL TESTOSTERONE: CPT

## 2024-10-04 PROCEDURE — 82040 ASSAY OF SERUM ALBUMIN: CPT

## 2024-10-05 LAB — DHEA-S SERPL-MCNC: 431 UG/DL (ref 35.4–256)

## 2024-10-08 ENCOUNTER — HOSPITAL ENCOUNTER (OUTPATIENT)
Facility: MEDICAL CENTER | Age: 49
End: 2024-10-08
Attending: INTERNAL MEDICINE
Payer: COMMERCIAL

## 2024-10-08 DIAGNOSIS — N18.2 CKD (CHRONIC KIDNEY DISEASE) STAGE 2, GFR 60-89 ML/MIN: ICD-10-CM

## 2024-10-08 DIAGNOSIS — N25.1 DIABETES INSIPIDUS, NEPHROGENIC (HCC): Chronic | ICD-10-CM

## 2024-10-08 DIAGNOSIS — N20.0 NEPHROLITHIASIS: Chronic | ICD-10-CM

## 2024-10-08 LAB
ANDROST SERPL-MCNC: 0.48 NG/ML (ref 0.13–0.82)
DHEA SERPL-MCNC: 1.31 NG/ML (ref 0.63–4.7)
SHBG SERPL-SCNC: 35 NMOL/L (ref 17–125)
TESTOST FREE SERPL-MCNC: 97.1 PG/ML (ref 1.1–5.8)
TESTOST SERPL-MCNC: 525 NG/DL (ref 9–55)

## 2024-10-08 PROCEDURE — 82340 ASSAY OF CALCIUM IN URINE: CPT

## 2024-10-08 PROCEDURE — 82507 ASSAY OF CITRATE: CPT

## 2024-10-08 PROCEDURE — 84105 ASSAY OF URINE PHOSPHORUS: CPT

## 2024-10-08 PROCEDURE — 84133 ASSAY OF URINE POTASSIUM: CPT

## 2024-10-08 PROCEDURE — 83945 ASSAY OF OXALATE: CPT

## 2024-10-08 PROCEDURE — 84300 ASSAY OF URINE SODIUM: CPT

## 2024-10-08 PROCEDURE — 82570 ASSAY OF URINE CREATININE: CPT

## 2024-10-08 PROCEDURE — 82436 ASSAY OF URINE CHLORIDE: CPT

## 2024-10-08 PROCEDURE — 84560 ASSAY OF URINE/URIC ACID: CPT

## 2024-10-08 PROCEDURE — 83986 ASSAY PH BODY FLUID NOS: CPT

## 2024-10-08 PROCEDURE — 83735 ASSAY OF MAGNESIUM: CPT

## 2024-10-10 PROCEDURE — RXMED WILLOW AMBULATORY MEDICATION CHARGE: Performed by: STUDENT IN AN ORGANIZED HEALTH CARE EDUCATION/TRAINING PROGRAM

## 2024-10-15 ENCOUNTER — PHARMACY VISIT (OUTPATIENT)
Dept: PHARMACY | Facility: MEDICAL CENTER | Age: 49
End: 2024-10-15
Payer: COMMERCIAL

## 2024-10-21 LAB
CALCIUM 24H UR-MCNC: 2.3 MG/DL
CALCIUM 24H UR-MRATE: 155 MG/D (ref 100–250)
CHLORIDE 24H UR-SCNC: 34 MMOL/L
CHLORIDE 24H UR-SRATE: 230 MMOL/D (ref 140–250)
CITRATE 24H UR-MCNC: 103 MG/L
CITRATE 24H UR-MRATE: 695 MG/D (ref 320–1240)
COLLECT DURATION TIME SPEC: 24 HR
CREAT 24H UR-MCNC: 28 MG/DL
MAGNESIUM 24H UR-MCNC: 4.1 MG/DL
MAGNESIUM 24H UR-MRATE: 277 MG/D (ref 12–199)
OXALATE 24H UR-MCNC: 10 MG/L
OXALATE 24H UR-MRATE: 68 MG/D (ref 13–40)
PATHOLOGY STUDY: ABNORMAL
PH UR: 7.6 [PH] (ref 5–7.5)
PHOSPHATE 24H UR-MCNC: 11 MG/DL
PHOSPHATE 24H UR-MRATE: 742 MG/D (ref 400–1300)
POTASSIUM 24H UR-SCNC: 36 MMOL/L
POTASSIUM 24H UR-SRATE: 243 MMOL/D (ref 25–125)
SODIUM 24H UR-SCNC: 33 MMOL/L
SODIUM 24H UR-SRATE: 223 MMOL/D (ref 51–286)
TOTAL VOLUME 1105: 6750 ML
URATE 24H UR-MCNC: 9.5 MG/DL
URATE 24H UR-MRATE: 641 MG/D (ref 250–750)

## 2024-10-30 ENCOUNTER — OFFICE VISIT (OUTPATIENT)
Dept: BEHAVIORAL HEALTH | Facility: CLINIC | Age: 49
End: 2024-10-30
Payer: COMMERCIAL

## 2024-10-30 VITALS
DIASTOLIC BLOOD PRESSURE: 70 MMHG | WEIGHT: 156 LBS | HEART RATE: 92 BPM | OXYGEN SATURATION: 98 % | BODY MASS INDEX: 26.78 KG/M2 | SYSTOLIC BLOOD PRESSURE: 112 MMHG

## 2024-10-30 DIAGNOSIS — Z79.899 HIGH RISK MEDICATION USE: ICD-10-CM

## 2024-10-30 DIAGNOSIS — F41.1 GENERALIZED ANXIETY DISORDER: ICD-10-CM

## 2024-10-30 DIAGNOSIS — Z79.899 LONG TERM CURRENT USE OF ANTIPSYCHOTIC MEDICATION: ICD-10-CM

## 2024-10-30 DIAGNOSIS — F31.81 BIPOLAR 2 DISORDER (HCC): ICD-10-CM

## 2024-10-30 DIAGNOSIS — G40.909 SEIZURE DISORDER (HCC): ICD-10-CM

## 2024-10-30 DIAGNOSIS — F51.04 CHRONIC INSOMNIA: ICD-10-CM

## 2024-10-30 RX ORDER — LAMOTRIGINE 200 MG/1
200 TABLET ORAL 2 TIMES DAILY
Qty: 180 TABLET | Refills: 3 | Status: SHIPPED | OUTPATIENT
Start: 2024-10-30 | End: 2025-10-25

## 2024-10-30 RX ORDER — ARIPIPRAZOLE 2 MG/1
2 TABLET ORAL DAILY
Qty: 30 TABLET | Refills: 2 | Status: SHIPPED | OUTPATIENT
Start: 2024-10-30 | End: 2025-01-28

## 2024-10-30 RX ORDER — ESZOPICLONE 1 MG/1
1 TABLET, FILM COATED ORAL
Qty: 30 TABLET | Refills: 0 | Status: SHIPPED | OUTPATIENT
Start: 2024-11-04 | End: 2024-12-04

## 2024-10-30 RX ORDER — ESCITALOPRAM OXALATE 10 MG/1
10 TABLET ORAL EVERY MORNING
Qty: 90 TABLET | Refills: 1 | Status: SHIPPED | OUTPATIENT
Start: 2024-10-30

## 2024-10-30 RX ORDER — ESZOPICLONE 1 MG/1
1 TABLET, FILM COATED ORAL
Qty: 30 TABLET | Refills: 0 | Status: SHIPPED | OUTPATIENT
Start: 2024-12-04 | End: 2025-01-03

## 2024-10-30 RX ORDER — METFORMIN HYDROCHLORIDE 500 MG/1
1000 TABLET, EXTENDED RELEASE ORAL DAILY
Qty: 180 TABLET | Refills: 0 | Status: SHIPPED | OUTPATIENT
Start: 2024-10-30 | End: 2025-01-28

## 2024-10-30 ASSESSMENT — ENCOUNTER SYMPTOMS
TINGLING: 0
CARDIOVASCULAR NEGATIVE: 1
VOMITING: 0
HEADACHES: 1
RESPIRATORY NEGATIVE: 1
DIARRHEA: 0
CONSTIPATION: 0
WEAKNESS: 0
GASTROINTESTINAL NEGATIVE: 1
SHORTNESS OF BREATH: 0
NAUSEA: 0
CONSTITUTIONAL NEGATIVE: 1

## 2024-10-31 ENCOUNTER — TELEPHONE (OUTPATIENT)
Dept: NEUROLOGY | Facility: MEDICAL CENTER | Age: 49
End: 2024-10-31
Payer: COMMERCIAL

## 2024-10-31 NOTE — TELEPHONE ENCOUNTER
Received a call from patient stating that she started Aimovig and its not working foe her. She is keep having migraines nearly every day and asking for alternative medication.  Please advise    Jessica Smith Ass't

## 2024-11-08 PROCEDURE — RXMED WILLOW AMBULATORY MEDICATION CHARGE: Performed by: STUDENT IN AN ORGANIZED HEALTH CARE EDUCATION/TRAINING PROGRAM

## 2024-11-11 ENCOUNTER — PHARMACY VISIT (OUTPATIENT)
Dept: PHARMACY | Facility: MEDICAL CENTER | Age: 49
End: 2024-11-11
Payer: COMMERCIAL

## 2024-11-14 ENCOUNTER — OFFICE VISIT (OUTPATIENT)
Dept: NEPHROLOGY | Facility: MEDICAL CENTER | Age: 49
End: 2024-11-14
Payer: COMMERCIAL

## 2024-11-14 VITALS
BODY MASS INDEX: 26.98 KG/M2 | TEMPERATURE: 98.4 F | OXYGEN SATURATION: 95 % | HEART RATE: 84 BPM | SYSTOLIC BLOOD PRESSURE: 106 MMHG | WEIGHT: 158 LBS | DIASTOLIC BLOOD PRESSURE: 58 MMHG | HEIGHT: 64 IN

## 2024-11-14 DIAGNOSIS — N25.1 DIABETES INSIPIDUS, NEPHROGENIC (HCC): Chronic | ICD-10-CM

## 2024-11-14 DIAGNOSIS — N20.0 NEPHROLITHIASIS: Chronic | ICD-10-CM

## 2024-11-14 DIAGNOSIS — F31.81 BIPOLAR 2 DISORDER (HCC): ICD-10-CM

## 2024-11-14 DIAGNOSIS — N18.2 CKD (CHRONIC KIDNEY DISEASE) STAGE 2, GFR 60-89 ML/MIN: ICD-10-CM

## 2024-11-14 DIAGNOSIS — E55.9 VITAMIN D DEFICIENCY: ICD-10-CM

## 2024-11-14 PROCEDURE — 3074F SYST BP LT 130 MM HG: CPT | Performed by: INTERNAL MEDICINE

## 2024-11-14 PROCEDURE — G2211 COMPLEX E/M VISIT ADD ON: HCPCS | Performed by: INTERNAL MEDICINE

## 2024-11-14 PROCEDURE — 99214 OFFICE O/P EST MOD 30 MIN: CPT | Performed by: INTERNAL MEDICINE

## 2024-11-14 PROCEDURE — 3078F DIAST BP <80 MM HG: CPT | Performed by: INTERNAL MEDICINE

## 2024-11-14 RX ORDER — ESTRADIOL 1 MG/1
1 TABLET ORAL DAILY
COMMUNITY
Start: 2024-08-30 | End: 2024-11-28

## 2024-11-14 RX ORDER — PROGESTERONE 100 MG/1
100 CAPSULE ORAL DAILY
COMMUNITY
Start: 2024-11-12

## 2024-11-14 RX ORDER — LEVOTHYROXINE SODIUM 100 UG/1
100 TABLET ORAL
COMMUNITY
Start: 2024-08-30

## 2024-11-14 RX ORDER — DESMOPRESSIN ACETATE 0.1 MG/1
TABLET ORAL
Qty: 65 TABLET | Refills: 0 | Status: SHIPPED | OUTPATIENT
Start: 2024-11-14 | End: 2024-12-04

## 2024-11-14 ASSESSMENT — ENCOUNTER SYMPTOMS
FEVER: 0
SHORTNESS OF BREATH: 0
ABDOMINAL PAIN: 0
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
## 2024-11-29 ENCOUNTER — PATIENT MESSAGE (OUTPATIENT)
Dept: NEPHROLOGY | Facility: MEDICAL CENTER | Age: 49
End: 2024-11-29

## 2024-12-04 DIAGNOSIS — N25.1 DIABETES INSIPIDUS, NEPHROGENIC (HCC): Chronic | ICD-10-CM

## 2024-12-04 RX ORDER — DESMOPRESSIN ACETATE 0.2 MG/1
0.2 TABLET ORAL 2 TIMES DAILY
Qty: 180 TABLET | Refills: 3 | Status: SHIPPED | OUTPATIENT
Start: 2024-12-04

## 2024-12-04 NOTE — PROGRESS NOTES
Patient experiencing benefit in polyuria from desmopressin at a dose of 0.2mg PO BID, will continue this dose.     Trung Schwab MD  Nephrology  [facility]

## 2024-12-05 PROCEDURE — RXMED WILLOW AMBULATORY MEDICATION CHARGE: Performed by: STUDENT IN AN ORGANIZED HEALTH CARE EDUCATION/TRAINING PROGRAM

## 2024-12-06 ENCOUNTER — PHARMACY VISIT (OUTPATIENT)
Dept: PHARMACY | Facility: MEDICAL CENTER | Age: 49
End: 2024-12-06
Payer: COMMERCIAL

## 2025-01-02 ENCOUNTER — APPOINTMENT (OUTPATIENT)
Dept: LAB | Facility: MEDICAL CENTER | Age: 50
End: 2025-01-02
Payer: COMMERCIAL

## 2025-01-02 LAB
BASOPHILS # BLD AUTO: 1 % (ref 0–1.8)
BASOPHILS # BLD: 0.07 K/UL (ref 0–0.12)
DHEA-S SERPL-MCNC: 480 UG/DL (ref 35.4–256)
EOSINOPHIL # BLD AUTO: 0.04 K/UL (ref 0–0.51)
EOSINOPHIL NFR BLD: 0.6 % (ref 0–6.9)
ERYTHROCYTE [DISTWIDTH] IN BLOOD BY AUTOMATED COUNT: 40 FL (ref 35.9–50)
ESTRADIOL SERPL-MCNC: 34.6 PG/ML
FSH SERPL-ACNC: 67 MIU/ML
HCT VFR BLD AUTO: 47 % (ref 37–47)
HGB BLD-MCNC: 16.2 G/DL (ref 12–16)
IMM GRANULOCYTES # BLD AUTO: 0.02 K/UL (ref 0–0.11)
IMM GRANULOCYTES NFR BLD AUTO: 0.3 % (ref 0–0.9)
LH SERPL-ACNC: 46 IU/L
LYMPHOCYTES # BLD AUTO: 1.69 K/UL (ref 1–4.8)
LYMPHOCYTES NFR BLD: 24.9 % (ref 22–41)
MCH RBC QN AUTO: 32.8 PG (ref 27–33)
MCHC RBC AUTO-ENTMCNC: 34.5 G/DL (ref 32.2–35.5)
MCV RBC AUTO: 95.1 FL (ref 81.4–97.8)
MONOCYTES # BLD AUTO: 0.6 K/UL (ref 0–0.85)
MONOCYTES NFR BLD AUTO: 8.8 % (ref 0–13.4)
NEUTROPHILS # BLD AUTO: 4.37 K/UL (ref 1.82–7.42)
NEUTROPHILS NFR BLD: 64.4 % (ref 44–72)
NRBC # BLD AUTO: 0 K/UL
NRBC BLD-RTO: 0 /100 WBC (ref 0–0.2)
PLATELET # BLD AUTO: 340 K/UL (ref 164–446)
PMV BLD AUTO: 9.1 FL (ref 9–12.9)
RBC # BLD AUTO: 4.94 M/UL (ref 4.2–5.4)
T3FREE SERPL-MCNC: 3.34 PG/ML (ref 2–4.4)
T4 FREE SERPL-MCNC: 1.27 NG/DL (ref 0.93–1.7)
TSH SERPL DL<=0.005 MIU/L-ACNC: 0.02 UIU/ML (ref 0.38–5.33)
WBC # BLD AUTO: 6.8 K/UL (ref 4.8–10.8)

## 2025-01-02 PROCEDURE — 83002 ASSAY OF GONADOTROPIN (LH): CPT

## 2025-01-02 PROCEDURE — 83001 ASSAY OF GONADOTROPIN (FSH): CPT

## 2025-01-02 PROCEDURE — 84443 ASSAY THYROID STIM HORMONE: CPT

## 2025-01-02 PROCEDURE — 82626 DEHYDROEPIANDROSTERONE: CPT

## 2025-01-02 PROCEDURE — 85025 COMPLETE CBC W/AUTO DIFF WBC: CPT

## 2025-01-02 PROCEDURE — RXMED WILLOW AMBULATORY MEDICATION CHARGE: Performed by: STUDENT IN AN ORGANIZED HEALTH CARE EDUCATION/TRAINING PROGRAM

## 2025-01-02 PROCEDURE — 82157 ASSAY OF ANDROSTENEDIONE: CPT

## 2025-01-02 PROCEDURE — 84270 ASSAY OF SEX HORMONE GLOBUL: CPT

## 2025-01-02 PROCEDURE — 84403 ASSAY OF TOTAL TESTOSTERONE: CPT

## 2025-01-02 PROCEDURE — 84481 FREE ASSAY (FT-3): CPT

## 2025-01-02 PROCEDURE — 36415 COLL VENOUS BLD VENIPUNCTURE: CPT

## 2025-01-02 PROCEDURE — 82627 DEHYDROEPIANDROSTERONE: CPT

## 2025-01-02 PROCEDURE — 82670 ASSAY OF TOTAL ESTRADIOL: CPT

## 2025-01-02 PROCEDURE — 84402 ASSAY OF FREE TESTOSTERONE: CPT

## 2025-01-02 PROCEDURE — 84439 ASSAY OF FREE THYROXINE: CPT

## 2025-01-06 ENCOUNTER — PHARMACY VISIT (OUTPATIENT)
Dept: PHARMACY | Facility: MEDICAL CENTER | Age: 50
End: 2025-01-06
Payer: COMMERCIAL

## 2025-01-07 DIAGNOSIS — F51.04 CHRONIC INSOMNIA: ICD-10-CM

## 2025-01-07 LAB
ANDROST SERPL-MCNC: 0.78 NG/ML (ref 0.13–0.82)
DHEA SERPL-MCNC: 3.46 NG/ML (ref 0.63–4.7)
SHBG SERPL-SCNC: 37 NMOL/L (ref 17–125)
TESTOST FREE SERPL-MCNC: 85.6 PG/ML (ref 1.1–5.8)
TESTOST SERPL-MCNC: 483 NG/DL (ref 9–55)

## 2025-01-08 ENCOUNTER — APPOINTMENT (OUTPATIENT)
Dept: BEHAVIORAL HEALTH | Facility: CLINIC | Age: 50
End: 2025-01-08
Payer: COMMERCIAL

## 2025-01-08 VITALS
BODY MASS INDEX: 27.46 KG/M2 | DIASTOLIC BLOOD PRESSURE: 60 MMHG | HEART RATE: 93 BPM | OXYGEN SATURATION: 99 % | SYSTOLIC BLOOD PRESSURE: 110 MMHG | WEIGHT: 160 LBS

## 2025-01-08 DIAGNOSIS — F31.81 BIPOLAR 2 DISORDER (HCC): ICD-10-CM

## 2025-01-08 DIAGNOSIS — Z79.899 LONG TERM CURRENT USE OF ANTIPSYCHOTIC MEDICATION: ICD-10-CM

## 2025-01-08 DIAGNOSIS — F41.1 GENERALIZED ANXIETY DISORDER: ICD-10-CM

## 2025-01-08 DIAGNOSIS — Z79.899 HIGH RISK MEDICATION USE: ICD-10-CM

## 2025-01-08 DIAGNOSIS — G40.909 SEIZURE DISORDER (HCC): ICD-10-CM

## 2025-01-08 DIAGNOSIS — F51.04 CHRONIC INSOMNIA: ICD-10-CM

## 2025-01-08 PROCEDURE — 3074F SYST BP LT 130 MM HG: CPT

## 2025-01-08 PROCEDURE — 3078F DIAST BP <80 MM HG: CPT

## 2025-01-08 PROCEDURE — 99214 OFFICE O/P EST MOD 30 MIN: CPT | Mod: GC

## 2025-01-08 RX ORDER — METFORMIN HYDROCHLORIDE 500 MG/1
500 TABLET, EXTENDED RELEASE ORAL DAILY
Qty: 90 TABLET | Refills: 0 | Status: SHIPPED | OUTPATIENT
Start: 2025-01-08 | End: 2025-04-08

## 2025-01-08 RX ORDER — LAMOTRIGINE 200 MG/1
200 TABLET ORAL 2 TIMES DAILY
Qty: 180 TABLET | Refills: 3 | Status: SHIPPED | OUTPATIENT
Start: 2025-01-08 | End: 2026-01-03

## 2025-01-08 RX ORDER — ARIPIPRAZOLE 2 MG/1
2 TABLET ORAL DAILY
Qty: 30 TABLET | Refills: 2 | Status: SHIPPED | OUTPATIENT
Start: 2025-01-08 | End: 2025-04-08

## 2025-01-08 RX ORDER — ESCITALOPRAM OXALATE 10 MG/1
5 TABLET ORAL EVERY MORNING
Qty: 5 TABLET | Refills: 0 | Status: SHIPPED | OUTPATIENT
Start: 2025-01-08 | End: 2025-01-18

## 2025-01-08 ASSESSMENT — ENCOUNTER SYMPTOMS
CONSTIPATION: 0
GASTROINTESTINAL NEGATIVE: 1
DIARRHEA: 0
VOMITING: 0
CONSTITUTIONAL NEGATIVE: 1
HEADACHES: 0
CARDIOVASCULAR NEGATIVE: 1
TINGLING: 0
SHORTNESS OF BREATH: 0
NAUSEA: 0
RESPIRATORY NEGATIVE: 1
WEAKNESS: 0
NEUROLOGICAL NEGATIVE: 1

## 2025-01-09 RX ORDER — ESZOPICLONE 1 MG/1
1 TABLET, FILM COATED ORAL
Qty: 30 TABLET | Refills: 0 | Status: SHIPPED | OUTPATIENT
Start: 2025-01-09 | End: 2025-02-08

## 2025-01-09 ASSESSMENT — PATIENT HEALTH QUESTIONNAIRE - PHQ9: CLINICAL INTERPRETATION OF PHQ2 SCORE: 0

## 2025-01-09 NOTE — ASSESSMENT & PLAN NOTE
Problem type: Chronic Illness with exacerbation, progression, or side effects of treatment    Assessment: 49 year old female with hx of bipolar II who presents for follow up. Past history of stabilization on lithium but was taken off in 2022 due to acquired thyroid & kidney disease. Possible internal activation/pressure from SSRI driving anxiety, thus taper off of Lexapro is warranted.  Subjective tongue and feet movements decreased and no longer bothersome. Ongoing daily cannabis use which was again discussed with patient; patient pre-contemplative at this time. Plan to make medication changes as outlined below and follow up in 4 weeks.    Plan  Medication:   -Continue Lamictal 400mg PO daily (on current dose for epilepsy)  -Continue Abilify 2mg PO daily  -Decrease Metformin ER to 500mg PO daily to reduce risk of lactic acidosis  -Decrease Lexapro to 5mg PO daily for 10 days then discontinue    Therapy:   -Consider long-term psychotherapy    Other Orders: none
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
## 2025-01-09 NOTE — ASSESSMENT & PLAN NOTE
Problem type: Chronic Illness with exacerbation, progression, or side effects of treatment    Assessment: Anxiety remains well-controlled on Lexapro but given comorbid PTSD, patient would likely benefit substantially from psychotherapy and reduction of cannabis use.    Plan  Medication:   -See above; may consider Buspar in place of Abilify in the future    Therapy:   -Consider psychotherapy    Other Orders: none

## 2025-01-13 ENCOUNTER — OFFICE VISIT (OUTPATIENT)
Dept: MEDICAL GROUP | Age: 50
End: 2025-01-13
Payer: COMMERCIAL

## 2025-01-13 VITALS
DIASTOLIC BLOOD PRESSURE: 62 MMHG | TEMPERATURE: 97.5 F | HEART RATE: 90 BPM | SYSTOLIC BLOOD PRESSURE: 104 MMHG | WEIGHT: 160 LBS | HEIGHT: 64 IN | OXYGEN SATURATION: 98 % | BODY MASS INDEX: 27.31 KG/M2

## 2025-01-13 DIAGNOSIS — N18.2 CKD (CHRONIC KIDNEY DISEASE) STAGE 2, GFR 60-89 ML/MIN: ICD-10-CM

## 2025-01-13 DIAGNOSIS — L98.9 NON-HEALING SKIN LESION OF NOSE: ICD-10-CM

## 2025-01-13 PROCEDURE — 99214 OFFICE O/P EST MOD 30 MIN: CPT | Performed by: STUDENT IN AN ORGANIZED HEALTH CARE EDUCATION/TRAINING PROGRAM

## 2025-01-13 PROCEDURE — 3074F SYST BP LT 130 MM HG: CPT | Performed by: STUDENT IN AN ORGANIZED HEALTH CARE EDUCATION/TRAINING PROGRAM

## 2025-01-13 PROCEDURE — 3078F DIAST BP <80 MM HG: CPT | Performed by: STUDENT IN AN ORGANIZED HEALTH CARE EDUCATION/TRAINING PROGRAM

## 2025-01-13 RX ORDER — ESTRADIOL 1 MG/1
1 TABLET ORAL DAILY
COMMUNITY
Start: 2024-11-30 | End: 2025-02-28

## 2025-01-13 ASSESSMENT — ENCOUNTER SYMPTOMS
DIZZINESS: 0
WEAKNESS: 0
ORTHOPNEA: 0
WHEEZING: 0
BLOOD IN STOOL: 0
CHILLS: 0
PALPITATIONS: 0
BRUISES/BLEEDS EASILY: 0
BLURRED VISION: 0
ABDOMINAL PAIN: 0
COUGH: 0
DEPRESSION: 0
HEADACHES: 0
SINUS PAIN: 0
BACK PAIN: 0
FEVER: 0
SHORTNESS OF BREATH: 0
DOUBLE VISION: 0

## 2025-01-13 NOTE — PATIENT INSTRUCTIONS
-Continue home meds  -Follow up on referral to ENT and make appt once approved  -Follow up with your PCP as scheduled

## 2025-01-14 NOTE — PROGRESS NOTES
Subjective:     CC: Right nostril chronic lesion    HPI:   History of Present Illness  The patient is a 49-year-old female presenting for nose lesion.    She reports a persistent nasal sore that intermittently crusts and bleeds, a condition she has been experiencing for approximately 3 to 4 months. The sore is visible upon inspection and does not fully heal, instead, it exhibits a pattern of slight improvement followed by recurrence. She reports no history of trauma or surgical intervention in the affected area. She also reports no episodes of bleeding from the sore but acknowledges associated pain and discomfort. She has a known diagnosis of a deviated septum. She recalls a past episode of impetigo in the same region.    Supplemental Information  She has diabetes insipidus, diagnosed about 5 to 6 years ago. She also has kidney disease. She is under the care of Dr. Kasia Gray for primary care. She takes all her medications regularly except for her inhaler which she uses as needed.    SOCIAL HISTORY  The patient has a history of smoking and drug use in her youth but reports no current use of illicit substances.       ROS:  Review of Systems   Constitutional:  Negative for chills, fever and malaise/fatigue.   HENT:  Positive for nosebleeds. Negative for congestion, ear discharge, sinus pain and tinnitus.    Eyes:  Negative for blurred vision and double vision.   Respiratory:  Negative for cough, shortness of breath and wheezing.    Cardiovascular:  Negative for chest pain, palpitations, orthopnea and leg swelling.   Gastrointestinal:  Negative for abdominal pain, blood in stool and melena.   Genitourinary:  Negative for dysuria and urgency.   Musculoskeletal:  Negative for back pain and joint pain.   Skin:  Negative for rash.   Neurological:  Negative for dizziness, weakness and headaches.   Endo/Heme/Allergies:  Does not bruise/bleed easily.   Psychiatric/Behavioral:  Negative for depression and suicidal ideas.   "      Objective:     Exam:  /62 (BP Location: Right arm, Patient Position: Sitting, BP Cuff Size: Adult)   Pulse 90   Temp 36.4 °C (97.5 °F) (Temporal)   Ht 1.626 m (5' 4\")   Wt 72.6 kg (160 lb)   SpO2 98%   BMI 27.46 kg/m²  Body mass index is 27.46 kg/m².    Physical Exam  Vitals reviewed.   Constitutional:       General: She is not in acute distress.  HENT:      Head: Normocephalic and atraumatic.      Mouth/Throat:      Mouth: Mucous membranes are moist.   Eyes:      General: No scleral icterus.     Extraocular Movements: Extraocular movements intact.      Pupils: Pupils are equal, round, and reactive to light.   Cardiovascular:      Rate and Rhythm: Normal rate and regular rhythm.      Pulses: Normal pulses.      Heart sounds: Normal heart sounds. No murmur heard.  Pulmonary:      Effort: Pulmonary effort is normal. No respiratory distress.      Breath sounds: Normal breath sounds. No wheezing.   Abdominal:      General: There is no distension.      Palpations: Abdomen is soft.      Tenderness: There is no abdominal tenderness. There is no guarding or rebound.   Musculoskeletal:      Cervical back: Normal range of motion and neck supple.      Right lower leg: No edema.      Left lower leg: No edema.   Skin:     General: Skin is warm.      Capillary Refill: Capillary refill takes less than 2 seconds.      Coloration: Skin is not jaundiced.   Neurological:      General: No focal deficit present.      Mental Status: She is alert.      Cranial Nerves: No cranial nerve deficit.      Sensory: No sensory deficit.   Psychiatric:         Mood and Affect: Mood normal.         Behavior: Behavior normal.       Labs: Reviewed    Assessment & Plan:     49 y.o. female with the following -     1. Non-healing skin lesion of nose  A lesion was identified in the right nostril, characterized by irregular bleeding and ulceration. An urgent referral to an Ear, Nose, and Throat (ENT) specialist has been initiated for " further evaluation and potential biopsy of the lesion. She has been advised to monitor the status of the referral via MyChart and to contact the office if no communication is received within a week.  - Referral to ENT    2. CKD (chronic kidney disease) stage 2, GFR 60-89 ml/min  -Chronic, stable   - following with nephrologist   - per chart review diabetes and hypertension seems well-controlled -continue same therapy  - lab work at least every 4 to 6 months  -Continue to follow-up with nephrologist and PCP           HCC Gap Form    Last edited 01/13/25 17:20 PST by Keyur Martinez M.D.         Return if symptoms worsen or fail to improve.    Please note that this dictation was created using voice recognition software. I have made every reasonable attempt to correct obvious errors, but I expect that there are errors of grammar and possibly content that I did not discover before finalizing the note.

## 2025-01-27 RX ORDER — HYDROCHLOROTHIAZIDE 25 MG/1
TABLET ORAL
Qty: 180 TABLET | Refills: 3 | Status: SHIPPED | OUTPATIENT
Start: 2025-01-27

## 2025-02-10 ENCOUNTER — OFFICE VISIT (OUTPATIENT)
Dept: BEHAVIORAL HEALTH | Facility: CLINIC | Age: 50
End: 2025-02-10
Payer: COMMERCIAL

## 2025-02-10 VITALS
SYSTOLIC BLOOD PRESSURE: 124 MMHG | DIASTOLIC BLOOD PRESSURE: 66 MMHG | BODY MASS INDEX: 28.43 KG/M2 | OXYGEN SATURATION: 97 % | WEIGHT: 165.6 LBS | HEART RATE: 97 BPM

## 2025-02-10 DIAGNOSIS — G40.909 SEIZURE DISORDER (HCC): ICD-10-CM

## 2025-02-10 DIAGNOSIS — Z79.899 LONG TERM CURRENT USE OF ANTIPSYCHOTIC MEDICATION: ICD-10-CM

## 2025-02-10 DIAGNOSIS — F41.1 GENERALIZED ANXIETY DISORDER: ICD-10-CM

## 2025-02-10 DIAGNOSIS — F31.81 BIPOLAR 2 DISORDER (HCC): ICD-10-CM

## 2025-02-10 DIAGNOSIS — Z79.899 HIGH RISK MEDICATION USE: ICD-10-CM

## 2025-02-10 RX ORDER — ESZOPICLONE 1 MG/1
1 TABLET, FILM COATED ORAL
Qty: 30 TABLET | Refills: 0 | Status: SHIPPED | OUTPATIENT
Start: 2025-02-10 | End: 2025-03-12

## 2025-02-10 RX ORDER — LAMOTRIGINE 200 MG/1
200 TABLET ORAL 2 TIMES DAILY
Qty: 180 TABLET | Refills: 3 | Status: SHIPPED | OUTPATIENT
Start: 2025-02-10 | End: 2026-02-05

## 2025-02-10 RX ORDER — BUSPIRONE HYDROCHLORIDE 5 MG/1
5 TABLET ORAL 3 TIMES DAILY
Qty: 90 TABLET | Refills: 0 | Status: SHIPPED | OUTPATIENT
Start: 2025-02-10 | End: 2025-03-12

## 2025-02-10 ASSESSMENT — ENCOUNTER SYMPTOMS
CONSTITUTIONAL NEGATIVE: 1
NAUSEA: 1
RESPIRATORY NEGATIVE: 1
WEAKNESS: 0
CONSTIPATION: 0
SHORTNESS OF BREATH: 0
TINGLING: 0
DIARRHEA: 0
CARDIOVASCULAR NEGATIVE: 1
HEADACHES: 1
VOMITING: 0
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
## 2025-02-12 NOTE — ASSESSMENT & PLAN NOTE
Problem type: Chronic Illness with exacerbation, progression, or side effects of treatment    Assessment: 49 year old female with hx of bipolar II who presents for follow up. Past history of stabilization on lithium but was taken off in 2022 due to acquired thyroid & kidney disease. Tolerated taper off of Lexapro but has refractory generalized worry/anxiety which is likely amenable to trial of Buspar.  Subjective tongue and feet movements decreased and no longer bothersome. Ongoing daily cannabis use which was again discussed with patient; patient pre-contemplative at this time. Plan to make medication changes as outlined below and follow up in 4 weeks.    Plan  Medication:   -Continue Lamictal 400mg PO daily (on current dose for epilepsy)  -Start Buspar 5mg TID  -Discontinue Abilify 2mg PO daily  -Discontinue Metformin ER to 500mg PO daily to reduce risk of lactic acidosis      Therapy:   -Consider long-term psychotherapy    Other Orders: none

## 2025-02-12 NOTE — ASSESSMENT & PLAN NOTE
Problem type: Chronic Illness with exacerbation, progression, or side effects of treatment    Assessment: Anxiety slowly increasing over the past few months even prior to taper off of Lexapro therefore trial of Buspar is warranted with comorbid bipolar. Patient would likely benefit substantially from psychotherapy and reduction of cannabis use.    Plan  Medication:   -See above    Therapy:   -Consider psychotherapy    Other Orders: none

## 2025-02-13 ENCOUNTER — OFFICE VISIT (OUTPATIENT)
Dept: NEUROLOGY | Facility: MEDICAL CENTER | Age: 50
End: 2025-02-13
Attending: STUDENT IN AN ORGANIZED HEALTH CARE EDUCATION/TRAINING PROGRAM
Payer: COMMERCIAL

## 2025-02-13 ENCOUNTER — TELEPHONE (OUTPATIENT)
Dept: PHARMACY | Facility: MEDICAL CENTER | Age: 50
End: 2025-02-13

## 2025-02-13 VITALS
TEMPERATURE: 98.5 F | SYSTOLIC BLOOD PRESSURE: 108 MMHG | RESPIRATION RATE: 16 BRPM | WEIGHT: 164.9 LBS | HEIGHT: 64 IN | DIASTOLIC BLOOD PRESSURE: 68 MMHG | HEART RATE: 85 BPM | BODY MASS INDEX: 28.15 KG/M2 | OXYGEN SATURATION: 97 %

## 2025-02-13 DIAGNOSIS — R11.2 NAUSEA AND VOMITING, UNSPECIFIED VOMITING TYPE: ICD-10-CM

## 2025-02-13 DIAGNOSIS — G89.4 CHRONIC PAIN SYNDROME: ICD-10-CM

## 2025-02-13 DIAGNOSIS — Z12.11 SCREENING FOR COLORECTAL CANCER: ICD-10-CM

## 2025-02-13 DIAGNOSIS — N25.1 DIABETES INSIPIDUS, NEPHROGENIC (HCC): Chronic | ICD-10-CM

## 2025-02-13 DIAGNOSIS — F51.01 PRIMARY INSOMNIA: ICD-10-CM

## 2025-02-13 DIAGNOSIS — R25.1 TREMOR: ICD-10-CM

## 2025-02-13 DIAGNOSIS — F41.1 GENERALIZED ANXIETY DISORDER: ICD-10-CM

## 2025-02-13 DIAGNOSIS — G43.E11 INTRACTABLE CHRONIC MIGRAINE WITH AURA WITH STATUS MIGRAINOSUS: ICD-10-CM

## 2025-02-13 DIAGNOSIS — Z12.12 SCREENING FOR COLORECTAL CANCER: ICD-10-CM

## 2025-02-13 DIAGNOSIS — E03.9 HYPOTHYROIDISM, UNSPECIFIED TYPE: ICD-10-CM

## 2025-02-13 DIAGNOSIS — F31.9 BIPOLAR AFFECTIVE DISORDER, REMISSION STATUS UNSPECIFIED (HCC): ICD-10-CM

## 2025-02-13 DIAGNOSIS — G40.909 SEIZURE DISORDER (HCC): ICD-10-CM

## 2025-02-13 PROCEDURE — 3078F DIAST BP <80 MM HG: CPT | Performed by: STUDENT IN AN ORGANIZED HEALTH CARE EDUCATION/TRAINING PROGRAM

## 2025-02-13 PROCEDURE — 99214 OFFICE O/P EST MOD 30 MIN: CPT | Performed by: STUDENT IN AN ORGANIZED HEALTH CARE EDUCATION/TRAINING PROGRAM

## 2025-02-13 PROCEDURE — 3074F SYST BP LT 130 MM HG: CPT | Performed by: STUDENT IN AN ORGANIZED HEALTH CARE EDUCATION/TRAINING PROGRAM

## 2025-02-13 PROCEDURE — 99212 OFFICE O/P EST SF 10 MIN: CPT | Performed by: STUDENT IN AN ORGANIZED HEALTH CARE EDUCATION/TRAINING PROGRAM

## 2025-02-13 RX ORDER — SUMATRIPTAN SUCCINATE 100 MG/1
100 TABLET ORAL
Qty: 10 TABLET | Refills: 5 | Status: SHIPPED
Start: 2025-02-17

## 2025-02-13 RX ORDER — FREMANEZUMAB-VFRM 225 MG/1.5ML
225 INJECTION SUBCUTANEOUS
Qty: 1.5 ML | Refills: 11 | Status: SHIPPED | OUTPATIENT
Start: 2025-02-13 | End: 2026-02-08

## 2025-02-13 RX ORDER — ONDANSETRON 8 MG/1
8 TABLET, ORALLY DISINTEGRATING ORAL EVERY 8 HOURS PRN
Qty: 20 TABLET | Refills: 5 | Status: SHIPPED | OUTPATIENT
Start: 2025-02-13 | End: 2025-08-12

## 2025-02-13 RX ORDER — ALPRAZOLAM 0.25 MG
0.25 TABLET ORAL NIGHTLY PRN
Qty: 90 TABLET | Refills: 1 | Status: SHIPPED | OUTPATIENT
Start: 2025-02-13 | End: 2025-08-12

## 2025-02-13 ASSESSMENT — PATIENT HEALTH QUESTIONNAIRE - PHQ9
SUM OF ALL RESPONSES TO PHQ QUESTIONS 1-9: 8
5. POOR APPETITE OR OVEREATING: 1 - SEVERAL DAYS
CLINICAL INTERPRETATION OF PHQ2 SCORE: 2

## 2025-02-13 ASSESSMENT — VISUAL ACUITY: VA_NORMAL: 1

## 2025-02-13 NOTE — TELEPHONE ENCOUNTER
Received [employer] Start  request via [employer]  for sumatriptan (IMITREX) 100 MG tablet. (Quantity:9, Day Supply:30)     Insurance: [facility]  Member ID:  [id number]  BIN: [id number]  PCN: [facility]  Group: [id number]     Ran Test claim via [facility] medication Pays for a $9.61 copay. Will outreach to patient to offer specialty pharmacy services and or release to preferred pharmacy    Pt already fills at the [facility] pharmacy.
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
## 2025-02-13 NOTE — PATIENT INSTRUCTIONS
NEUROLOGY CLINIC VISIT WITH DR. BRUMFIELD     PLEASE READ THIS ENTIRE DOCUMENT CAREFULLY AND COMPLETELY:    First and foremost, you matter to Dr. Brumfield and you deserve the best care.   Dr. Brumfield prides himself on providing the best possible care to all his patients. He strives to make each appointment meaningful, so that all your concerns are being addressed and all your neurological problems are being optimally treated. In order to achieve these goals for everyone, Dr. Brumfield has listed important reminders and the best ways to prepare for each appointment. Please read each item carefully. Thank you!    Due to the high volume of patients we are trying to help, your physician will not be able to respond by phone or in [facility] to your routine concerns between appointments.  This does not reflect a lack of interest or concern for you or your diagnosis.  Please bring these questions and concerns to your appointment where your physician can answer.  Please relay more pressing concerns to our office, either via PLAXDhart, or by phone; if not able to reach us please visit nearby Urgent Care Center or Emergency Department.  If any emergent medical needs, please seek emergent medical help and/or call 911.    Also, please note that we are not able to fill out paperwork that might be related to your work, utility company, disability, and/or driving, among others, in between the visits.  Please schedule a dedicated appointment to address any and all paperwork.  This is not due to lack of concern or interest for your disease-related work/administrative problems, but to make sure that we provide the best possible care and to fill out your paperwork in a correct, complete, and timely manner.  ------------------------------------------------------------------------------------------  Please let our office know if you have any changes in your seizure frequency and/characteristics.     Please keep a diary of your seizures and bring it  with you to each appointment.    Please take vitamin D3 [id number] internation units daily.     Please abstain from driving until further notice    If you are a biological female with epilepsy who is of reproductive age, who is actively breastfeeding, and/or who infants/young children:  Please take folic acid 1 mg daily. This is an over-the-counter supplement that is recommended to prevent certain developmental problems in your baby, in case you become pregnant in the future.  It is critical that you let our office know as soon as you become pregnant or plan to become pregnant.  If you are caring for a baby/young child, please make sure to be sitting on a soft surface while holding your baby/young child, so in case you have a seizure, your baby/young child is not injured due to fall.   Please let us know if, while breastfeeding, you observe that your baby is excessively sleepy and/or has other behavioral changes. Because many antiseizure medications are collected in breast milk, some nursing babies can suffer adverse medication effects.    Please note that the following might precipitate seizures:   missed doses of antiseizure medications  being sick with a fever, stress  Fatigue  sleep deprivation or abnormal sleeping patterns  not eating regularly  not drinking enough water  drinking too much alcohol  stopping alcohol suddenly if you are currently using it on a regular/daily basis,   using recreational drugs, among others.    Please note that the following might lead to an injury or even be life-threatening in the event you have a seizure and/or lose awareness while:  being in a large body of water by yourself, such as bath, pool, lake, ocean, among others (risk of drowning)  being on unprotected heights (risk of fall)  being around and/or operating heavy machinery (risk of injury)  being around open fire/hot surfaces (risk of burns)  any other activities/circumstances, in which if you lose awareness, you might  injure yourself and/or others.  -------------------------------------------------------------------------------------------  SUDEP (SUDDEN UNEXPECTED DEATH IN EPILEPSY)  It is important that your seizures are well controlled and you have none or have them rarely. In addition to avoiding injury related to breakthrough seizures, frequent seizures increase risk of SUDEP (sudden unexpected death in epilepsy), where a person goes into a seizure and then never wakes up. The best way to prevent SUDEP is to control your seizures well.   ------------------------------------------------------------------------------------------  Please call for help (crisis line and/or 911) in case you have thoughts of harming yourself and/or others.  ------------------------------------------------------------------------------------------  INSTRUCTIONS FOR YOUR FAMILY/CAREGIVERS:  Please call 911 if the patient has a seizure longer than 2-3 minutes, if seizures are back to back without her recovering to her baseline, or she does not start recovering within 5-10 minutes after the seizure stops. During the seizure - please turn her on her side, please make sure her head is protected (for example, you should put a pillow under her head, if one is available), and please do not put anything in her mouth.   ------------------------------------------------------------------------------------------  PATIENT EXPECTATIONS,  IMPORTANT APPOINTMENT REMINDERS, AND ADDITIONAL HELPFUL TIPS:   REFILLS:   Request refills AT LEAST 1 week in advance to ensure you do not run out of medications    MyChart  It is STRONGLY encouraged that ALL patients sign up for MyChart. It is BY FAR the fastest and most convenient way for both Dr. Brumfield and patients to obtain timely refills.  If you are having trouble signing up or logging into your account, staff are available to help you. Please ask a medical assistant or staff at the  to assist you.    TEST RESULS:    All labs and diagnostic test results will be reviewed at your next visit, UNLESS  Dr. Brumfield determines that there are important findings on the tests need to be acted on sooner. Dr. Brumfield will either call or send a message through [employer] if this is the case.    BE PREPARED PRIOR TO EVERY APPOINTMENT:  All patient are responsible for ensuring that ALL test results that were completed outside of the [email or web address] system have been received by our Neurology Department PRIOR to your appointment with Dr. Brumfield.    IMPORTANT:  ALL images (not just the reports) must be sent and uploaded to the [email or web address] system. Dr. Brumfield reviews all images personally prior to each visit. Ensuring that ALL the test results and test images are accessible to Dr. Brumfield prior to your appointment is YOUR responsibility and an important part of making the most out of each appointment.   Bring a government-issues picture ID and an updated insurance card EVERY visit.  It is highly recommended that you bring at every visit a list of the most important topics that you want address. While it may not be possible to address all items on the list in a single visit, preparing a list will ensure that Dr. Brumfield addresses the items that are most important to you and your health    PAPERWORK, DOCUMENTATION, LETTER REQUESTS:  You must notify the office ahead of your appointment of all paperwork or letter requests.   Please DO NOT wait until the last minute to make these requests. Please give all paperwork to the medical assistant at the start of the appointment and check-in process. Please note that Dr. Brumfield may not be able complete some types of documentation in a single appointment or even within a single day or week. This is why it is important to communicate paperwork requests prior to your appointment and at least 2 weeks prior to any deadlines.    KNOW ALL YOU MEDICATIONS:   AT EVERY SINGLE APPOINTMENT, please bring a list of every single prescribed,  non-prescribed, and over the counter medication or supplements you are taking, including ones taken on a rare or intermittent basis.  Include the following information for each prescribed or non-prescribed medications:  Name of medication   The strength of EACH pill/capsule/tablet, etc.   The number of pills/capsules/tablets, etc taken per dose  The number and time of day that doses are taken  For every single Supplement that you take on a routine or intermittent basis, you must include:  The Brand Name   A complete list of every single ingredient, compound, vitamin, and/or mineral in each dose, along with the corresponding amounts/strengths of all ingredients, vitamins, minerals, etc., if such information is provided or known  The number of doses taken per day and time of day doses are taken  If medications are taken on an intermittent or as needed basis, please estimate how many days per week or days per month the medications are used  DO NOT just print out your medication list from [facility] or bring a list from a prior appointment or hospitalizations because the information is often often unreliable, inaccurate, outdated, and/or incomplete   The list should be printed or written  If you forget or do not have a list of all the medication, then it is acceptable, although less preferred, to bring all the bottles to the appointment     ARRIVE EARLY FOR ALL VISITS:  Please note that we are unable to accommodate late arrivals as per office policy.  YOU-the patient - (NOT a parent, spouse, or friend) must be physically present at check-in no later than 12 minutes after the scheduled appointment time, or you will be asked to reschedule   Consider scheduling a virtual appointment with Dr. Brumfield through [facility] as an alternative if transportation to the clinic is difficult or unavailable   Please note, however, that virtual visits can only be scheduled after being an established patient of Dr. Brumfield. All new appointments  "must be done in-person in clinic  Some insurances will not cover the cost of virtual appointments. Please check with your insurance to find out if these visits are covered    COMMUNICATING URGENT AND NON-URGENT MATTERS:  Your concerns are important and deserve to be heard and addressed. If you have an urgent matter, there are two methods that will ensure your concerns are prioritized appropriately:   Preferred method: Sign-up/Login to your [employer] account and send a message addressed to Dr. Brumfield or Nicole Malin (Dr. Brumfield's assistant). In the subject line, type \"urgent\" followed by a word or phrase describing the situation (For example, write \"Urgent: Out of antiseuzre med and need refill\" or \"Urgent: Severe side effects to new meds\". In doing this, our staff can ensure urgent messages are triaged appropriately and communicated to Dr. Brumfield that day.  Call [facility] Neurology main line at [phone]. Dr. Brumfield's voicemail extension is [phone]. When leaving a voice message, specifically indicate if it is urgent (or non-urgent) so that the matter can be triaged appropriately and addressed in a timely manner    Thank you for entrusting your neurological care to [facility] Neurology and we look forward to continuing to serve you.   "

## 2025-02-19 ENCOUNTER — TELEPHONE (OUTPATIENT)
Dept: PHARMACY | Facility: MEDICAL CENTER | Age: 50
End: 2025-02-19
Payer: COMMERCIAL

## 2025-02-19 ENCOUNTER — DOCUMENTATION (OUTPATIENT)
Dept: PHARMACY | Facility: MEDICAL CENTER | Age: 50
End: 2025-02-19
Payer: COMMERCIAL

## 2025-02-19 PROCEDURE — RXMED WILLOW AMBULATORY MEDICATION CHARGE: Performed by: STUDENT IN AN ORGANIZED HEALTH CARE EDUCATION/TRAINING PROGRAM

## 2025-02-19 NOTE — TELEPHONE ENCOUNTER
Received New Start PA request via [employer]  for (AJOVY) 225 MG/1.5ML Solution Auto-injector . (Quantity:1.5, Day Supply:30)     Insurance: [employer] 2  Member ID:  [id number]  BIN: [id number]  PCN: [facility]  Group: [id number]     Ran Test claim via [place] & medication Rejects stating prior authorization is required.

## 2025-02-19 NOTE — TELEPHONE ENCOUNTER
Medication:     (AJOVY) 225 MG/1.5ML Solution Auto-injector     Type of Insurance: Commercial  Type of Financial assistance requested Copay Card  Source: [email or web address]/savings#get  Source Phone #: [phone]  Outcome: Approved  Effective dates: 02/19/25 until 12/31/25  Details/Billing Information:   [id number]  PCN: [id number]  GRP: [id number]  ID: [id number]  Max Award Amount: $9000  Final Copay: $15

## 2025-02-19 NOTE — PROGRESS NOTES
PHARMACIST CLINICAL ONBOARDING - Abbreviated Onboarding -   Result = Complete, Next card status: New Start    Therapeutic Category: Neurology - Migraine  ICD10: S21523  Diagnosis Details: Intractable chronic migraine with aura with status migrainosus [G43.E11]    Medication(s) associated with Therapeutic Category: Ajovy Solution Auto-injector 225 MG/1.5ML  Medication(s) prescribed for FDA approved indication?   -Ajovy Solution Auto-injector 225 MG/1.5ML: Yes      Med not actively followed by specialty pharmacists team due to low risk. We will assist as needed for any questions or concerns.

## 2025-02-19 NOTE — TELEPHONE ENCOUNTER
Prior Authorization for     (AJOVY) 225 MG/1.5ML Solution Auto-injector    (Quantity: 1.5, Days: 30) has been submitted via Cover My Meds: Key ([id number])    Insurance: [employer] 2    Will follow up in 24-48 business hours.

## 2025-02-19 NOTE — TELEPHONE ENCOUNTER
Prior Authorization for (AJOVY) 225 MG/1.5ML Solution Auto-injector  has been approved for a quantity of 1.5 , day supply 30    Prior Authorization reference number: [id number]  Insurance: [employer] 2  Effective dates: 02/19/25 to 2/19/26  Copay: $40     Is patient eligible to fill with [facility]? Yes    Next Steps: The patient's copay exceeds $5.00. Proceed with contacting patient to offer financial assistance.

## 2025-02-20 ENCOUNTER — PHARMACY VISIT (OUTPATIENT)
Dept: PHARMACY | Facility: MEDICAL CENTER | Age: 50
End: 2025-02-20
Payer: COMMERCIAL

## 2025-03-04 LAB — NONINV COLON CA DNA+OCC BLD SCRN STL QL: NEGATIVE

## 2025-03-06 DIAGNOSIS — F31.81 BIPOLAR 2 DISORDER (HCC): ICD-10-CM

## 2025-03-06 RX ORDER — BUSPIRONE HYDROCHLORIDE 5 MG/1
5 TABLET ORAL 3 TIMES DAILY
Qty: 270 TABLET | Refills: 1 | Status: SHIPPED | OUTPATIENT
Start: 2025-03-06

## 2025-03-11 DIAGNOSIS — F31.81 BIPOLAR 2 DISORDER (HCC): ICD-10-CM

## 2025-03-11 RX ORDER — ESZOPICLONE 1 MG/1
1 TABLET, FILM COATED ORAL
Qty: 30 TABLET | Refills: 0 | Status: SHIPPED | OUTPATIENT
Start: 2025-03-11 | End: 2025-03-24 | Stop reason: SDUPTHER

## 2025-03-17 PROCEDURE — RXMED WILLOW AMBULATORY MEDICATION CHARGE: Performed by: STUDENT IN AN ORGANIZED HEALTH CARE EDUCATION/TRAINING PROGRAM

## 2025-03-18 ENCOUNTER — PHARMACY VISIT (OUTPATIENT)
Dept: PHARMACY | Facility: MEDICAL CENTER | Age: 50
End: 2025-03-18
Payer: COMMERCIAL

## 2025-03-24 ENCOUNTER — APPOINTMENT (OUTPATIENT)
Dept: BEHAVIORAL HEALTH | Facility: CLINIC | Age: 50
End: 2025-03-24
Payer: COMMERCIAL

## 2025-03-24 DIAGNOSIS — F31.81 BIPOLAR 2 DISORDER (HCC): ICD-10-CM

## 2025-03-24 RX ORDER — ESZOPICLONE 1 MG/1
1 TABLET, FILM COATED ORAL
Qty: 30 TABLET | Refills: 0 | Status: SHIPPED | OUTPATIENT
Start: 2025-04-10 | End: 2025-05-10

## 2025-03-24 NOTE — TELEPHONE ENCOUNTER
Received request via: Patient    Was the patient seen in the last year in this department? Yes    Does the patient have an active prescription (recently filled or refills available) for medication(s) requested? No    Pharmacy Name: [facility] [id number]     Does the patient have longterm Plus and need 100-day supply? (This applies to ALL medications) Patient does not have SCP

## 2025-04-01 ENCOUNTER — APPOINTMENT (OUTPATIENT)
Dept: DERMATOLOGY | Facility: IMAGING CENTER | Age: 50
End: 2025-04-01
Payer: COMMERCIAL

## 2025-04-01 DIAGNOSIS — L82.1 SEBORRHEIC KERATOSIS: ICD-10-CM

## 2025-04-01 PROCEDURE — 99999 PR NO CHARGE: CPT | Performed by: STUDENT IN AN ORGANIZED HEALTH CARE EDUCATION/TRAINING PROGRAM

## 2025-04-01 NOTE — PROGRESS NOTES
[facility] DERMATOLOGY CLINIC NOTE    No chief complaint on file.         HPI:    Earnestine Lindsay is a 49 y.o. female presenting today for evaluation of skin growths.     1.) Skin growth on: right cheek   Duration: months    Associated symptoms: none    Prior treatments: none       No other symptomatic (itching, painful, burning) or changing lesions.       Dermatology History:      - Skin cancer risk factors: tanning beds in youth       - Family history of skin cancer: none      - Prior skin cancers: none        Review of Systems: No fevers, chill. Pertinent positives and negatives above.       Medications, Medical History, Surgical History, Family History & Allergies:  Reviewed in the chart, relevant history noted above.         PHYSICAL EXAM, ASSESSMENT, & PLAN (per problem):   A focused skin exam was performed including the affected areas of the head (including face). Notable findings on exam today listed below and/or in assessment/plan.       Seborrheic keratosis   Exam: white stuck on papule on right cheek consistent with SK     benign, reassurance   Courtesy light cryo applied to lesion no charge x 5 seconds, if desired treatments in future discussed cosmetic out of pocket fee            Follow up: No follow-ups on file.        Miryam Olivo MD   [facility] Dermatology

## 2025-04-10 PROCEDURE — RXMED WILLOW AMBULATORY MEDICATION CHARGE: Performed by: STUDENT IN AN ORGANIZED HEALTH CARE EDUCATION/TRAINING PROGRAM

## 2025-04-11 ENCOUNTER — OFFICE VISIT (OUTPATIENT)
Dept: BEHAVIORAL HEALTH | Facility: CLINIC | Age: 50
End: 2025-04-11
Payer: COMMERCIAL

## 2025-04-11 DIAGNOSIS — F31.81 BIPOLAR 2 DISORDER (HCC): ICD-10-CM

## 2025-04-11 PROCEDURE — 99214 OFFICE O/P EST MOD 30 MIN: CPT | Mod: GC

## 2025-04-11 RX ORDER — RISPERIDONE 0.5 MG/1
TABLET ORAL
Qty: 62 TABLET | Refills: 0 | Status: SHIPPED | OUTPATIENT
Start: 2025-04-11 | End: 2025-04-11

## 2025-04-11 RX ORDER — RISPERIDONE 1 MG/1
TABLET ORAL
Qty: 17 TABLET | Refills: 0 | Status: SHIPPED | OUTPATIENT
Start: 2025-04-11 | End: 2025-04-18 | Stop reason: SDUPTHER

## 2025-04-12 ASSESSMENT — ENCOUNTER SYMPTOMS
VOMITING: 0
NEUROLOGICAL NEGATIVE: 1
SHORTNESS OF BREATH: 0
DIARRHEA: 0
CONSTIPATION: 0
HEADACHES: 0
NAUSEA: 0
RESPIRATORY NEGATIVE: 1
WEAKNESS: 0
GASTROINTESTINAL NEGATIVE: 1
CARDIOVASCULAR NEGATIVE: 1
TINGLING: 0
CONSTITUTIONAL NEGATIVE: 1
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
## 2025-04-12 NOTE — ASSESSMENT & PLAN NOTE
R/O Type I    Problem type: Chronic Illness with exacerbation, progression, or side effects of treatment    Assessment: 49 year old female with hx of bipolar II who presents for follow up. Past history of stabilization on lithium but was taken off in 2022 due to acquired thyroid & kidney disease (CKD and DI). Recently tolerated taper off of Lexapro but experienced refractory generalized worry/anxiety which did not respond to trial of Buspar.  Anxiety/irritability worsened further with discontinuation of Abilify. Therefore, timeline and further evaluation of presentation is most consistent with kindling/breakthrough bipolar sxs. While reported side effects on Abilify were concerning for akathisia and TD, these were inconsistent between visits and do not fully align with its pharmacodynamic properties with regards to dopamine receptors. Therefore full D2 antagonist remains an option. Plan to obtain serum lamotrigine level, start low-dose risperidone, and follow up closely in 1 week. Can consider addition of PRN clonidine acutely or future low-dose lithium adjunct pending lab work-up and care coordination with nephrologist. Not advised at this time given current thyroid and kidney labs (especially with HCTZ which can elevate lithium levels). Ongoing daily cannabis use which was again discussed with patient; patient remains pre-contemplative/contemplative at this time.    Plan  Medication:   -Continue Lamictal 400mg PO daily (on current dose for epilepsy)  -Discontinue Buspar 5mg TID  -Start Risperdal 0.5mg at bedtime, then increase by 0.5mg per day until dose of 2mg is reached      Therapy:   -Consider long-term psychotherapy    Other Orders: none

## 2025-04-14 ENCOUNTER — PHARMACY VISIT (OUTPATIENT)
Dept: PHARMACY | Facility: MEDICAL CENTER | Age: 50
End: 2025-04-14
Payer: COMMERCIAL

## 2025-04-15 ENCOUNTER — HOSPITAL ENCOUNTER (OUTPATIENT)
Facility: MEDICAL CENTER | Age: 50
End: 2025-04-15
Payer: COMMERCIAL

## 2025-04-15 DIAGNOSIS — F31.81 BIPOLAR 2 DISORDER (HCC): ICD-10-CM

## 2025-04-15 PROCEDURE — 80175 DRUG SCREEN QUAN LAMOTRIGINE: CPT

## 2025-04-15 PROCEDURE — 36415 COLL VENOUS BLD VENIPUNCTURE: CPT

## 2025-04-16 ENCOUNTER — TELEPHONE (OUTPATIENT)
Dept: BEHAVIORAL HEALTH | Facility: PSYCHIATRIC FACILITY | Age: 50
End: 2025-04-16
Payer: COMMERCIAL

## 2025-04-16 NOTE — TELEPHONE ENCOUNTER
"Spoke with patient via telephone to follow up since last appointment. Reports resolution of irritable/angry mood. Reports improvement in feeling \"twitchy [...] restricted by clothing\" and less panic/anxious feelings. Denies any new abnormal involuntary movements or other side effects. Reports sleep remains unchanged (terminal insomnia); decreased need for sleep with only 3-4 hours per night. Discussed plan to continue Risperdal 2mg at bedtime and all other medications with follow up in-person on Friday; patient expressed understanding and agreement with plan. No further questions or concerns.   "

## 2025-04-17 LAB — LAMOTRIGINE SERPL-MCNC: 8.5 UG/ML (ref 3–15)

## 2025-04-18 ENCOUNTER — RESULTS FOLLOW-UP (OUTPATIENT)
Dept: BEHAVIORAL HEALTH | Facility: CLINIC | Age: 50
End: 2025-04-18

## 2025-04-18 ENCOUNTER — OFFICE VISIT (OUTPATIENT)
Dept: BEHAVIORAL HEALTH | Facility: CLINIC | Age: 50
End: 2025-04-18
Payer: COMMERCIAL

## 2025-04-18 VITALS
HEART RATE: 110 BPM | SYSTOLIC BLOOD PRESSURE: 106 MMHG | WEIGHT: 165 LBS | OXYGEN SATURATION: 100 % | DIASTOLIC BLOOD PRESSURE: 60 MMHG | BODY MASS INDEX: 28.32 KG/M2

## 2025-04-18 DIAGNOSIS — Z79.899 HIGH RISK MEDICATION USE: ICD-10-CM

## 2025-04-18 DIAGNOSIS — F51.04 CHRONIC INSOMNIA: ICD-10-CM

## 2025-04-18 DIAGNOSIS — F31.81 BIPOLAR 2 DISORDER (HCC): ICD-10-CM

## 2025-04-18 DIAGNOSIS — Z79.899 LONG TERM CURRENT USE OF ANTIPSYCHOTIC MEDICATION: ICD-10-CM

## 2025-04-18 PROCEDURE — 3078F DIAST BP <80 MM HG: CPT

## 2025-04-18 PROCEDURE — 99214 OFFICE O/P EST MOD 30 MIN: CPT | Mod: GC

## 2025-04-18 PROCEDURE — 3074F SYST BP LT 130 MM HG: CPT

## 2025-04-18 RX ORDER — CLONIDINE HYDROCHLORIDE 0.1 MG/1
0.1 TABLET ORAL
Qty: 30 TABLET | Refills: 0 | Status: SHIPPED | OUTPATIENT
Start: 2025-04-18 | End: 2025-05-18

## 2025-04-18 RX ORDER — METFORMIN HYDROCHLORIDE 500 MG/1
500 TABLET, EXTENDED RELEASE ORAL DAILY
Qty: 90 TABLET | Refills: 0 | Status: SHIPPED | OUTPATIENT
Start: 2025-04-18 | End: 2025-07-17

## 2025-04-18 RX ORDER — RISPERIDONE 2 MG/1
2 TABLET ORAL NIGHTLY
Qty: 30 TABLET | Refills: 0 | Status: SHIPPED | OUTPATIENT
Start: 2025-04-18 | End: 2025-05-18
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
## 2025-04-19 ASSESSMENT — ENCOUNTER SYMPTOMS
GASTROINTESTINAL NEGATIVE: 1
RESPIRATORY NEGATIVE: 1
CARDIOVASCULAR NEGATIVE: 1
SHORTNESS OF BREATH: 0
DIARRHEA: 0
CONSTIPATION: 0
WEAKNESS: 0
TINGLING: 0
NAUSEA: 0
HEADACHES: 0
VOMITING: 0
NEUROLOGICAL NEGATIVE: 1
CONSTITUTIONAL NEGATIVE: 1

## 2025-04-19 NOTE — ASSESSMENT & PLAN NOTE
R/O Bipolar disorder, type I    Problem type: Chronic Illness with exacerbation, progression, or side effects of treatment    Assessment: 49 year old female with hx of bipolar II who presents for follow up. Past history of stabilization on lithium but was taken off in 2022 due to acquired thyroid & kidney disease (CKD and DI). Recently tolerated taper off of Lexapro but experienced refractory generalized worry/anxiety which did not respond to trial of Buspar.  Anxiety/irritability worsened further with discontinuation of Abilify. Description of current and past mood episodes concerning for bipolar type 1. No acute safety concerns. While reported side effects on Abilify were concerning for akathisia and TD, these were inconsistent between visits and do not fully align with its pharmacodynamic properties with regards to dopamine receptors. Therefore full D2 antagonist remained an option. Serum lamotrigine level WNL, with partial response to risperidone, therefore will continue medications at current doses to minimize dopaminergic burden and start PRN clonidine acutely. Will co-commence Metformin in accordance with most recent practice guidelines. May consider future low-dose lithium adjunct pending informed consent and care coordination with nephrologist. Not advised at this time given current thyroid and kidney labs (especially with HCTZ which can elevate lithium levels). Ongoing daily cannabis use which was again discussed with patient; patient now in contemplative/preparation stage at this time. Will follow up closely in 1 week.    Plan  Medication:   -Continue Lamictal 200mg PO BID (on current dose for epilepsy)  -Continue Risperdal 2mg at bedtime  -Start Metformin ER 500mg at dinner  -Start Clonidine 0.1mg qhs PRN for breakthrough manic sxs    Therapy:   -Consider long-term psychotherapy    Other Orders: none

## 2025-04-21 NOTE — ASSESSMENT & PLAN NOTE
Problem type: Chronic Illness, Stable    Assessment: Most likely component of bipolar and/or PTSD. Has successfully reduced dose of Lunesta in the past year. Will address bipolar disorder as outlined above but may consider future medication changes for chronic sleep disturbance.    Plan  Medication:   -Continue Lunesta 1mg    Therapy:   -Consider CBT-i    Other Orders: none

## 2025-04-25 ENCOUNTER — TELEMEDICINE (OUTPATIENT)
Dept: BEHAVIORAL HEALTH | Facility: CLINIC | Age: 50
End: 2025-04-25
Payer: COMMERCIAL

## 2025-04-25 DIAGNOSIS — N18.2 CKD (CHRONIC KIDNEY DISEASE) STAGE 2, GFR 60-89 ML/MIN: ICD-10-CM

## 2025-04-25 DIAGNOSIS — Z79.899 LONG TERM CURRENT USE OF ANTIPSYCHOTIC MEDICATION: ICD-10-CM

## 2025-04-25 DIAGNOSIS — F31.81 BIPOLAR 2 DISORDER (HCC): ICD-10-CM

## 2025-04-25 PROCEDURE — 99999 PR NO CHARGE: CPT | Mod: 95

## 2025-04-25 ASSESSMENT — ENCOUNTER SYMPTOMS
WEAKNESS: 0
GASTROINTESTINAL NEGATIVE: 1
NAUSEA: 0
SHORTNESS OF BREATH: 0
CONSTITUTIONAL NEGATIVE: 1
VOMITING: 0
TINGLING: 0
DIARRHEA: 0
HEADACHES: 1
RESPIRATORY NEGATIVE: 1
CARDIOVASCULAR NEGATIVE: 1
CONSTIPATION: 0
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
## 2025-04-25 NOTE — ASSESSMENT & PLAN NOTE
R/O Bipolar disorder, type I    Problem type: Chronic Illness with exacerbation, progression, or side effects of treatment; improving    Assessment: 49 year old female with hx of bipolar II who presents for follow up. Past history of stabilization on lithium but was taken off in 2022 due to acquired thyroid & kidney disease (CKD and DI). Recently tolerated taper off of Lexapro but experienced refractory generalized worry/anxiety which did not respond to trial of Buspar.  Anxiety/irritability worsened further with discontinuation of Abilify. Description of current and past mood episodes concerning for bipolar type 1. No acute safety concerns. While reported side effects on Abilify were concerning for akathisia and TD, these were inconsistent between visits and do not fully align with its pharmacodynamic properties with regards to dopamine receptors. Therefore full D2 antagonist remained an option. Serum lamotrigine level WNL, with partial response to risperidone, therefore will continue medications at current doses to minimize dopaminergic burden and continue PRN clonidine acutely. Will titrate Metformin in accordance with most recent practice guidelines. May consider future low-dose lithium adjunct pending informed consent and care coordination with nephrologist. Not advised at this time given current thyroid and kidney labs (especially with HCTZ which can elevate lithium levels). Ongoing daily cannabis use which was again discussed with patient; patient now in contemplative/preparation stage at this time. Will follow up closely in 1 week.    Plan  Medication:   -Continue Lamictal 200mg PO BID (on current dose for epilepsy)  -Continue Risperdal 2mg at bedtime  -Increase Metformin ER to 1000mg at dinner  -Continue Clonidine 0.1mg qhs PRN for breakthrough manic sxs    Therapy:   -Consider long-term psychotherapy    Other Orders:   -repeat cystatin C with eGFR for monitoring kidney function on increased dose of  metformin

## 2025-05-02 ENCOUNTER — HOSPITAL ENCOUNTER (OUTPATIENT)
Facility: MEDICAL CENTER | Age: 50
End: 2025-05-02
Attending: INTERNAL MEDICINE
Payer: COMMERCIAL

## 2025-05-02 ENCOUNTER — HOSPITAL ENCOUNTER (OUTPATIENT)
Facility: MEDICAL CENTER | Age: 50
End: 2025-05-02
Payer: COMMERCIAL

## 2025-05-02 ENCOUNTER — OFFICE VISIT (OUTPATIENT)
Dept: BEHAVIORAL HEALTH | Facility: CLINIC | Age: 50
End: 2025-05-02
Payer: COMMERCIAL

## 2025-05-02 VITALS
SYSTOLIC BLOOD PRESSURE: 122 MMHG | DIASTOLIC BLOOD PRESSURE: 60 MMHG | BODY MASS INDEX: 28.32 KG/M2 | WEIGHT: 165 LBS | HEART RATE: 100 BPM | OXYGEN SATURATION: 98 %

## 2025-05-02 DIAGNOSIS — Z79.899 HIGH RISK MEDICATION USE: ICD-10-CM

## 2025-05-02 DIAGNOSIS — F31.81 BIPOLAR 2 DISORDER (HCC): ICD-10-CM

## 2025-05-02 DIAGNOSIS — Z79.899 LONG TERM CURRENT USE OF ANTIPSYCHOTIC MEDICATION: ICD-10-CM

## 2025-05-02 LAB
BASOPHILS # BLD AUTO: 1.3 % (ref 0–1.8)
BASOPHILS # BLD: 0.07 K/UL (ref 0–0.12)
DHEA-S SERPL-MCNC: 599 UG/DL (ref 35.4–256)
EOSINOPHIL # BLD AUTO: 0.1 K/UL (ref 0–0.51)
EOSINOPHIL NFR BLD: 1.8 % (ref 0–6.9)
ERYTHROCYTE [DISTWIDTH] IN BLOOD BY AUTOMATED COUNT: 40.7 FL (ref 35.9–50)
ESTRADIOL SERPL-MCNC: 28.3 PG/ML
FSH SERPL-ACNC: 6.3 MIU/ML
HCT VFR BLD AUTO: 45 % (ref 37–47)
HGB BLD-MCNC: 14.8 G/DL (ref 12–16)
IMM GRANULOCYTES # BLD AUTO: 0.01 K/UL (ref 0–0.11)
IMM GRANULOCYTES NFR BLD AUTO: 0.2 % (ref 0–0.9)
LH SERPL-ACNC: 3.1 IU/L
LYMPHOCYTES # BLD AUTO: 1.26 K/UL (ref 1–4.8)
LYMPHOCYTES NFR BLD: 22.6 % (ref 22–41)
MCH RBC QN AUTO: 31.4 PG (ref 27–33)
MCHC RBC AUTO-ENTMCNC: 32.9 G/DL (ref 32.2–35.5)
MCV RBC AUTO: 95.3 FL (ref 81.4–97.8)
MONOCYTES # BLD AUTO: 0.46 K/UL (ref 0–0.85)
MONOCYTES NFR BLD AUTO: 8.2 % (ref 0–13.4)
NEUTROPHILS # BLD AUTO: 3.68 K/UL (ref 1.82–7.42)
NEUTROPHILS NFR BLD: 65.9 % (ref 44–72)
NRBC # BLD AUTO: 0 K/UL
NRBC BLD-RTO: 0 /100 WBC (ref 0–0.2)
PLATELET # BLD AUTO: 368 K/UL (ref 164–446)
PMV BLD AUTO: 9.9 FL (ref 9–12.9)
RBC # BLD AUTO: 4.72 M/UL (ref 4.2–5.4)
T3FREE SERPL-MCNC: 4.74 PG/ML (ref 2–4.4)
T4 FREE SERPL-MCNC: 1.44 NG/DL (ref 0.93–1.7)
TSH SERPL DL<=0.005 MIU/L-ACNC: 0.01 UIU/ML (ref 0.38–5.33)
WBC # BLD AUTO: 5.6 K/UL (ref 4.8–10.8)

## 2025-05-02 PROCEDURE — 84270 ASSAY OF SEX HORMONE GLOBUL: CPT

## 2025-05-02 PROCEDURE — 84481 FREE ASSAY (FT-3): CPT

## 2025-05-02 PROCEDURE — 84443 ASSAY THYROID STIM HORMONE: CPT

## 2025-05-02 PROCEDURE — 82626 DEHYDROEPIANDROSTERONE: CPT

## 2025-05-02 PROCEDURE — 3074F SYST BP LT 130 MM HG: CPT

## 2025-05-02 PROCEDURE — 83002 ASSAY OF GONADOTROPIN (LH): CPT

## 2025-05-02 PROCEDURE — 84403 ASSAY OF TOTAL TESTOSTERONE: CPT

## 2025-05-02 PROCEDURE — 36415 COLL VENOUS BLD VENIPUNCTURE: CPT

## 2025-05-02 PROCEDURE — 82610 CYSTATIN C: CPT

## 2025-05-02 PROCEDURE — 85025 COMPLETE CBC W/AUTO DIFF WBC: CPT

## 2025-05-02 PROCEDURE — 82157 ASSAY OF ANDROSTENEDIONE: CPT

## 2025-05-02 PROCEDURE — 3078F DIAST BP <80 MM HG: CPT

## 2025-05-02 PROCEDURE — 82627 DEHYDROEPIANDROSTERONE: CPT

## 2025-05-02 PROCEDURE — 82565 ASSAY OF CREATININE: CPT

## 2025-05-02 PROCEDURE — 99214 OFFICE O/P EST MOD 30 MIN: CPT | Mod: GC

## 2025-05-02 PROCEDURE — 82670 ASSAY OF TOTAL ESTRADIOL: CPT

## 2025-05-02 PROCEDURE — 84402 ASSAY OF FREE TESTOSTERONE: CPT

## 2025-05-02 PROCEDURE — 83001 ASSAY OF GONADOTROPIN (FSH): CPT

## 2025-05-02 PROCEDURE — 84439 ASSAY OF FREE THYROXINE: CPT

## 2025-05-02 RX ORDER — CLONIDINE HYDROCHLORIDE 0.1 MG/1
0.1 TABLET ORAL
Qty: 30 TABLET | Refills: 0 | Status: SHIPPED | OUTPATIENT
Start: 2025-05-02 | End: 2025-06-01

## 2025-05-02 RX ORDER — METFORMIN HYDROCHLORIDE 500 MG/1
1000 TABLET, EXTENDED RELEASE ORAL DAILY
Qty: 180 TABLET | Refills: 0 | Status: SHIPPED | OUTPATIENT
Start: 2025-05-02 | End: 2025-07-31

## 2025-05-02 RX ORDER — ESZOPICLONE 1 MG/1
1 TABLET, FILM COATED ORAL
Qty: 30 TABLET | Refills: 2 | Status: SHIPPED | OUTPATIENT
Start: 2025-05-07 | End: 2025-08-05

## 2025-05-02 RX ORDER — RISPERIDONE 2 MG/1
2 TABLET ORAL NIGHTLY
Qty: 90 TABLET | Refills: 1 | Status: SHIPPED | OUTPATIENT
Start: 2025-05-02 | End: 2025-10-29

## 2025-05-02 ASSESSMENT — ENCOUNTER SYMPTOMS
GASTROINTESTINAL NEGATIVE: 1
SHORTNESS OF BREATH: 0
DIARRHEA: 0
CONSTIPATION: 0
WEAKNESS: 0
NAUSEA: 0
HEADACHES: 0
CARDIOVASCULAR NEGATIVE: 1
CONSTITUTIONAL NEGATIVE: 1
VOMITING: 0
RESPIRATORY NEGATIVE: 1
TINGLING: 0
NEUROLOGICAL NEGATIVE: 1
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
## 2025-05-02 NOTE — ASSESSMENT & PLAN NOTE
R/O Bipolar disorder, type I    Problem type: Chronic Illness, Stable    Assessment: 49 year old female with hx of bipolar II who presents for follow up. Past history of stabilization on lithium but was taken off in 2022 due to acquired thyroid & kidney disease (CKD and DI). Recently tolerated taper off of Lexapro but experienced refractory generalized worry/anxiety which did not respond to trial of Buspar.  Anxiety/irritability worsened further with discontinuation of Abilify. Description of current and past mood episodes concerning for bipolar type 1. No acute safety concerns. While reported side effects on Abilify were concerning for akathisia and TD, these were inconsistent between visits and do not fully align with its pharmacodynamic properties with regards to dopamine receptors. Therefore full D2 antagonist remained an option. Serum lamotrigine level WNL and breakthrough manic symptoms now fully responded to current dose of risperidone, therefore will continue medications at current doses to minimize dopaminergic burden and continue PRN clonidine acutely. Will continue metformin in accordance with most recent practice guidelines. May consider future low-dose lithium adjunct pending informed consent and care coordination with nephrologist. Not advised at this time given current thyroid and kidney labs (especially with HCTZ which can elevate lithium levels). Ongoing daily cannabis use which was again discussed with patient; patient now in contemplative/preparation stage at this time. Will follow up in 4 weeks given stability.    Plan  Medication:   - Continue Lamictal 200mg PO BID (on current dose for epilepsy from Dr. Brumfield)  - Continue Xanax 0.25 mg daily as needed (also prescribed by neurologist Dr. Brumfield)  - Continue Risperdal 2mg at bedtime  - Continue metformin ER 1000mg at dinner  - Continue Clonidine 0.1mg qhs PRN for breakthrough manic sxs; may consider discontinuation at next visit to reduce  polypharmacy    Therapy:   -Consider long-term psychotherapy    Other Orders:  - AIMS score of 0 today  - Pending order for repeat cystatin C with eGFR for monitoring kidney function on increased dose of metformin

## 2025-05-04 LAB
CREAT SERPL-MCNC: 0.94 MG/DL (ref 0.59–1.01)
CYSTATIN C SERPL-MCNC: 0.91 MG/L (ref 0.61–0.95)
EGFRCR SERPLBLD CKD-EPI 2021: 82 ML/MIN/{1.73_M2}

## 2025-05-06 PROCEDURE — RXMED WILLOW AMBULATORY MEDICATION CHARGE: Performed by: STUDENT IN AN ORGANIZED HEALTH CARE EDUCATION/TRAINING PROGRAM

## 2025-05-07 ENCOUNTER — PHARMACY VISIT (OUTPATIENT)
Dept: PHARMACY | Facility: MEDICAL CENTER | Age: 50
End: 2025-05-07
Payer: COMMERCIAL

## 2025-05-07 LAB
ANDROST SERPL-MCNC: 0.68 NG/ML (ref 0.13–0.82)
DHEA SERPL-MCNC: 3.15 NG/ML (ref 0.63–4.7)
SHBG SERPL-SCNC: 39 NMOL/L (ref 17–125)
TESTOST FREE SERPL-MCNC: 9.3 PG/ML (ref 0.6–3.8)
TESTOST SERPL-MCNC: 61 NG/DL (ref 9–55)

## 2025-05-18 PROCEDURE — RXMED WILLOW AMBULATORY MEDICATION CHARGE: Performed by: STUDENT IN AN ORGANIZED HEALTH CARE EDUCATION/TRAINING PROGRAM

## 2025-05-20 ENCOUNTER — PHARMACY VISIT (OUTPATIENT)
Dept: PHARMACY | Facility: MEDICAL CENTER | Age: 50
End: 2025-05-20
Payer: COMMERCIAL

## 2025-06-04 ENCOUNTER — OFFICE VISIT (OUTPATIENT)
Dept: BEHAVIORAL HEALTH | Facility: CLINIC | Age: 50
End: 2025-06-04
Payer: COMMERCIAL

## 2025-06-04 VITALS
BODY MASS INDEX: 27.98 KG/M2 | OXYGEN SATURATION: 97 % | HEART RATE: 98 BPM | SYSTOLIC BLOOD PRESSURE: 110 MMHG | DIASTOLIC BLOOD PRESSURE: 64 MMHG | WEIGHT: 163 LBS

## 2025-06-04 DIAGNOSIS — F31.81 BIPOLAR 2 DISORDER (HCC): Primary | ICD-10-CM

## 2025-06-04 DIAGNOSIS — Z79.899 LONG TERM CURRENT USE OF ANTIPSYCHOTIC MEDICATION: ICD-10-CM

## 2025-06-04 DIAGNOSIS — Z79.899 HIGH RISK MEDICATION USE: ICD-10-CM

## 2025-06-04 DIAGNOSIS — G40.909 SEIZURE DISORDER (HCC): ICD-10-CM

## 2025-06-04 PROCEDURE — RXMED WILLOW AMBULATORY MEDICATION CHARGE: Performed by: STUDENT IN AN ORGANIZED HEALTH CARE EDUCATION/TRAINING PROGRAM

## 2025-06-04 PROCEDURE — 99999 PR NO CHARGE: CPT

## 2025-06-04 RX ORDER — METFORMIN HYDROCHLORIDE 500 MG/1
1000 TABLET, EXTENDED RELEASE ORAL DAILY
Qty: 180 TABLET | Refills: 0 | Status: SHIPPED | OUTPATIENT
Start: 2025-06-04 | End: 2025-09-02

## 2025-06-04 RX ORDER — LAMOTRIGINE 200 MG/1
200 TABLET ORAL 2 TIMES DAILY
Qty: 180 TABLET | Refills: 3 | Status: SHIPPED | OUTPATIENT
Start: 2025-06-04 | End: 2026-05-30

## 2025-06-04 RX ORDER — RISPERIDONE 2 MG/1
2 TABLET ORAL NIGHTLY
Qty: 90 TABLET | Refills: 1 | Status: SHIPPED | OUTPATIENT
Start: 2025-06-04 | End: 2025-12-01

## 2025-06-04 RX ORDER — ESZOPICLONE 1 MG/1
1 TABLET, FILM COATED ORAL
Qty: 30 TABLET | Refills: 2 | Status: SHIPPED | OUTPATIENT
Start: 2025-06-04 | End: 2025-09-02

## 2025-06-04 ASSESSMENT — ENCOUNTER SYMPTOMS
NAUSEA: 0
RESPIRATORY NEGATIVE: 1
WEAKNESS: 0
GASTROINTESTINAL NEGATIVE: 1
TINGLING: 0
DIARRHEA: 0
CARDIOVASCULAR NEGATIVE: 1
NEUROLOGICAL NEGATIVE: 1
CONSTIPATION: 0
VOMITING: 0
CONSTITUTIONAL NEGATIVE: 1
SHORTNESS OF BREATH: 0
HEADACHES: 0
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
## 2025-06-06 NOTE — ASSESSMENT & PLAN NOTE
R/O Bipolar disorder, type I    Problem type: Chronic Illness, Stable    Assessment: 49 year old female with hx of bipolar II who presents for follow up. Past history of stabilization on lithium but was taken off in 2022 due to acquired thyroid & kidney disease (CKD and DI). Recently tolerated taper off of Lexapro but experienced refractory generalized worry/anxiety which did not respond to trial of Buspar.  Anxiety/irritability worsened further with discontinuation of Abilify. Description of current and past mood episodes concerning for bipolar type 1 with etiology of genetic risk factors with conversion d/t chronic daily cannabis use. No acute safety concerns. Serum lamotrigine level WNL during breakthrough manic symptoms (decreased need for sleep) in early 2025. Reported side effects on Abilify were concerning for akathisia and TD, and have persisted since starting Risperdal although they are less severe. AIMS of 6 today and discussed long-term risks of full dopamine antagonism at length today. Patient verbalized understanding and expressed desire to continue medications at current doses.  Will continue metformin in accordance with most recent practice guidelines. May consider future low-dose lithium adjunct pending informed consent and care coordination with nephrologist. Not advised at this time given current thyroid and kidney labs (especially with HCTZ which can elevate lithium levels). Will follow up in 3 months to establish with new provider, given her stability.    Plan  Medication:   - Continue Lamictal 200mg PO BID (on current dose for epilepsy from Dr. Brumfield, continued by this clinic)  - Continue Xanax 0.25 mg daily as needed (also prescribed by neurologist Dr. Brumfield)  - Continue Risperdal 2mg at bedtime  - Continue metformin ER 1000mg at dinner  - Continue Clonidine 0.1mg qhs PRN at this time; may consider future discontinuation    Therapy:   - Consider long-term psychotherapy    Other Orders:  -  AIMS score of 6 today  - eGFR calculated by cystatin C is 82 on increased dose of metformin (previous eGFR in the low 60s)

## 2025-06-09 ENCOUNTER — PHARMACY VISIT (OUTPATIENT)
Dept: PHARMACY | Facility: MEDICAL CENTER | Age: 50
End: 2025-06-09
Payer: COMMERCIAL

## 2025-06-10 DIAGNOSIS — F31.81 BIPOLAR 2 DISORDER (HCC): ICD-10-CM

## 2025-06-10 RX ORDER — CLONIDINE HYDROCHLORIDE 0.1 MG/1
0.1 TABLET ORAL
Qty: 30 TABLET | Refills: 0 | Status: SHIPPED | OUTPATIENT
Start: 2025-06-10

## 2025-06-20 ENCOUNTER — OFFICE VISIT (OUTPATIENT)
Dept: NEUROLOGY | Facility: MEDICAL CENTER | Age: 50
End: 2025-06-20
Attending: STUDENT IN AN ORGANIZED HEALTH CARE EDUCATION/TRAINING PROGRAM
Payer: COMMERCIAL

## 2025-06-20 VITALS
RESPIRATION RATE: 16 BRPM | TEMPERATURE: 97.9 F | WEIGHT: 163.4 LBS | DIASTOLIC BLOOD PRESSURE: 76 MMHG | BODY MASS INDEX: 27.9 KG/M2 | HEART RATE: 103 BPM | SYSTOLIC BLOOD PRESSURE: 104 MMHG | OXYGEN SATURATION: 96 % | HEIGHT: 64 IN

## 2025-06-20 DIAGNOSIS — M62.838 MUSCLE SPASM: ICD-10-CM

## 2025-06-20 DIAGNOSIS — R11.2 NAUSEA AND VOMITING, UNSPECIFIED VOMITING TYPE: ICD-10-CM

## 2025-06-20 DIAGNOSIS — R25.1 TREMOR: ICD-10-CM

## 2025-06-20 DIAGNOSIS — G43.E11 INTRACTABLE CHRONIC MIGRAINE WITH AURA WITH STATUS MIGRAINOSUS: Primary | ICD-10-CM

## 2025-06-20 DIAGNOSIS — F31.9 BIPOLAR AFFECTIVE DISORDER, REMISSION STATUS UNSPECIFIED (HCC): ICD-10-CM

## 2025-06-20 DIAGNOSIS — F51.01 PRIMARY INSOMNIA: ICD-10-CM

## 2025-06-20 DIAGNOSIS — N25.1 DIABETES INSIPIDUS, NEPHROGENIC (HCC): ICD-10-CM

## 2025-06-20 DIAGNOSIS — G40.909 SEIZURE DISORDER (HCC): ICD-10-CM

## 2025-06-20 DIAGNOSIS — G44.221 CHRONIC TENSION-TYPE HEADACHE, INTRACTABLE: ICD-10-CM

## 2025-06-20 DIAGNOSIS — G89.4 CHRONIC PAIN SYNDROME: ICD-10-CM

## 2025-06-20 DIAGNOSIS — F41.1 GENERALIZED ANXIETY DISORDER: ICD-10-CM

## 2025-06-20 DIAGNOSIS — E03.9 HYPOTHYROIDISM, UNSPECIFIED TYPE: ICD-10-CM

## 2025-06-20 DIAGNOSIS — M62.89 MUSCLE TIGHTNESS: ICD-10-CM

## 2025-06-20 PROCEDURE — 99214 OFFICE O/P EST MOD 30 MIN: CPT | Performed by: PSYCHIATRY & NEUROLOGY

## 2025-06-20 PROCEDURE — 3074F SYST BP LT 130 MM HG: CPT | Performed by: STUDENT IN AN ORGANIZED HEALTH CARE EDUCATION/TRAINING PROGRAM

## 2025-06-20 PROCEDURE — 99214 OFFICE O/P EST MOD 30 MIN: CPT | Performed by: STUDENT IN AN ORGANIZED HEALTH CARE EDUCATION/TRAINING PROGRAM

## 2025-06-20 PROCEDURE — 3078F DIAST BP <80 MM HG: CPT | Performed by: STUDENT IN AN ORGANIZED HEALTH CARE EDUCATION/TRAINING PROGRAM

## 2025-06-20 RX ORDER — ONDANSETRON 8 MG/1
8 TABLET, ORALLY DISINTEGRATING ORAL EVERY 8 HOURS PRN
Qty: 20 TABLET | Refills: 5 | Status: SHIPPED | OUTPATIENT
Start: 2025-06-20 | End: 2025-12-17

## 2025-06-20 RX ORDER — FREMANEZUMAB-VFRM 225 MG/1.5ML
225 INJECTION SUBCUTANEOUS
Qty: 1.5 ML | Refills: 11 | Status: SHIPPED | OUTPATIENT
Start: 2025-06-20 | End: 2026-06-15

## 2025-06-20 RX ORDER — SUMATRIPTAN SUCCINATE 100 MG/1
100 TABLET ORAL
Qty: 10 TABLET | Refills: 5 | Status: SHIPPED | OUTPATIENT
Start: 2025-06-20 | End: 2025-12-17

## 2025-06-20 ASSESSMENT — PATIENT HEALTH QUESTIONNAIRE - PHQ9: CLINICAL INTERPRETATION OF PHQ2 SCORE: 0

## 2025-06-20 ASSESSMENT — VISUAL ACUITY: VA_NORMAL: 1

## 2025-06-20 NOTE — PATIENT INSTRUCTIONS
NEUROLOGY CLINIC VISIT WITH DR. BRUMFIELD     PLEASE READ THIS ENTIRE DOCUMENT CAREFULLY AND COMPLETELY:    First and foremost, you matter to Dr. Brumfield and you deserve the best care.   Dr. Brumfield prides himself on providing the best possible care to all his patients. He strives to make each appointment meaningful, so that all your concerns are being addressed and all your neurological problems are being optimally treated. In order to achieve these goals for everyone, Dr. Brumfield has listed important reminders and the best ways to prepare for each appointment. Please read each item carefully. Thank you!    Due to the high volume of patients we are trying to help, your physician will not be able to respond by phone or in [employer] to your routine concerns between appointments.  This does not reflect a lack of interest or concern for you or your diagnosis.  Please bring these questions and concerns to your appointment where your physician can answer.  Please relay more pressing concerns to our office, either via [employer], or by phone; if not able to reach us please visit nearby Urgent Care Center or Emergency Department.  If any emergent medical needs, please seek emergent medical help and/or call 911.    Also, please note that we are not able to fill out paperwork that might be related to your work, utility company, disability, and/or driving, among others, in between the visits.  Please schedule a dedicated appointment to address any and all paperwork.  This is not due to lack of concern or interest for your disease-related work/administrative problems, but to make sure that we provide the best possible care and to fill out your paperwork in a correct, complete, and timely manner.  ------------------------------------------------------------------------------------------  Please let our office know if you have any changes in your seizure frequency and/characteristics.     Please keep a diary of your seizures and bring it  with you to each appointment.    Please take vitamin D3 [id number] internation units daily.     Please abstain from driving until further notice    If you are a biological female with epilepsy who is of reproductive age, who is actively breastfeeding, and/or who infants/young children:  Please take folic acid 1 mg daily. This is an over-the-counter supplement that is recommended to prevent certain developmental problems in your baby, in case you become pregnant in the future.  It is critical that you let our office know as soon as you become pregnant or plan to become pregnant.  If you are caring for a baby/young child, please make sure to be sitting on a soft surface while holding your baby/young child, so in case you have a seizure, your baby/young child is not injured due to fall.   Please let us know if, while breastfeeding, you observe that your baby is excessively sleepy and/or has other behavioral changes. Because many antiseizure medications are collected in breast milk, some nursing babies can suffer adverse medication effects.    Please note that the following might precipitate seizures:   missed doses of antiseizure medications  being sick with a fever, stress  Fatigue  sleep deprivation or abnormal sleeping patterns  not eating regularly  not drinking enough water  drinking too much alcohol  stopping alcohol suddenly if you are currently using it on a regular/daily basis,   using recreational drugs, among others.    Please note that the following might lead to an injury or even be life-threatening in the event you have a seizure and/or lose awareness while:  being in a large body of water by yourself, such as bath, pool, lake, ocean, among others (risk of drowning)  being on unprotected heights (risk of fall)  being around and/or operating heavy machinery (risk of injury)  being around open fire/hot surfaces (risk of burns)  any other activities/circumstances, in which if you lose awareness, you might  injure yourself and/or others.  -------------------------------------------------------------------------------------------  SUDEP (SUDDEN UNEXPECTED DEATH IN EPILEPSY)  It is important that your seizures are well controlled and you have none or have them rarely. In addition to avoiding injury related to breakthrough seizures, frequent seizures increase risk of SUDEP (sudden unexpected death in epilepsy), where a person goes into a seizure and then never wakes up. The best way to prevent SUDEP is to control your seizures well.   ------------------------------------------------------------------------------------------  Please call for help (crisis line and/or 911) in case you have thoughts of harming yourself and/or others.  ------------------------------------------------------------------------------------------  INSTRUCTIONS FOR YOUR FAMILY/CAREGIVERS:  Please call 911 if the patient has a seizure longer than 2-3 minutes, if seizures are back to back without her recovering to her baseline, or she does not start recovering within 5-10 minutes after the seizure stops. During the seizure - please turn her on her side, please make sure her head is protected (for example, you should put a pillow under her head, if one is available), and please do not put anything in her mouth.   ------------------------------------------------------------------------------------------  PATIENT EXPECTATIONS,  IMPORTANT APPOINTMENT REMINDERS, AND ADDITIONAL HELPFUL TIPS:   REFILLS:   Request refills AT LEAST 1 week in advance to ensure you do not run out of medications    MyChart  It is STRONGLY encouraged that ALL patients sign up for MyChart. It is BY FAR the fastest and most convenient way for both Dr. Brumfield and patients to obtain timely refills.  If you are having trouble signing up or logging into your account, staff are available to help you. Please ask a medical assistant or staff at the  to assist you.    TEST RESULS:    All labs and diagnostic test results will be reviewed at your next visit, UNLESS  Dr. Brumfield determines that there are important findings on the tests need to be acted on sooner. Dr. Brumfield will either call or send a message through [employer] if this is the case.    BE PREPARED PRIOR TO EVERY APPOINTMENT:  All patient are responsible for ensuring that ALL test results that were completed outside of the [facility] system have been received by our Neurology Department PRIOR to your appointment with Dr. Brumfield.    IMPORTANT:  ALL images (not just the reports) must be sent and uploaded to the [facility] system. Dr. Brumfield reviews all images personally prior to each visit. Ensuring that ALL the test results and test images are accessible to Dr. Brumfield prior to your appointment is YOUR responsibility and an important part of making the most out of each appointment.   Bring a government-issues picture ID and an updated insurance card EVERY visit.  It is highly recommended that you bring at every visit a list of the most important topics that you want address. While it may not be possible to address all items on the list in a single visit, preparing a list will ensure that Dr. Brumfield addresses the items that are most important to you and your health    PAPERWORK, DOCUMENTATION, LETTER REQUESTS:  You must notify the office ahead of your appointment of all paperwork or letter requests.   Please DO NOT wait until the last minute to make these requests. Please give all paperwork to the medical assistant at the start of the appointment and check-in process. Please note that Dr. Brumfield may not be able complete some types of documentation in a single appointment or even within a single day or week. This is why it is important to communicate paperwork requests prior to your appointment and at least 2 weeks prior to any deadlines.    KNOW ALL YOU MEDICATIONS:   AT EVERY SINGLE APPOINTMENT, please bring a list of every single prescribed,  non-prescribed, and over the counter medication or supplements you are taking, including ones taken on a rare or intermittent basis.  Include the following information for each prescribed or non-prescribed medications:  Name of medication   The strength of EACH pill/capsule/tablet, etc.   The number of pills/capsules/tablets, etc taken per dose  The number and time of day that doses are taken  For every single Supplement that you take on a routine or intermittent basis, you must include:  The Brand Name   A complete list of every single ingredient, compound, vitamin, and/or mineral in each dose, along with the corresponding amounts/strengths of all ingredients, vitamins, minerals, etc., if such information is provided or known  The number of doses taken per day and time of day doses are taken  If medications are taken on an intermittent or as needed basis, please estimate how many days per week or days per month the medications are used  DO NOT just print out your medication list from [employer] or bring a list from a prior appointment or hospitalizations because the information is often often unreliable, inaccurate, outdated, and/or incomplete   The list should be printed or written  If you forget or do not have a list of all the medication, then it is acceptable, although less preferred, to bring all the bottles to the appointment     ARRIVE EARLY FOR ALL VISITS:  Please note that we are unable to accommodate late arrivals as per office policy.  YOU-the patient - (NOT a parent, spouse, or friend) must be physically present at check-in no later than 12 minutes after the scheduled appointment time, or you will be asked to reschedule   Consider scheduling a virtual appointment with Dr. Brumfield through [employer] as an alternative if transportation to the clinic is difficult or unavailable   Please note, however, that virtual visits can only be scheduled after being an established patient of Dr. Brumfield. All new appointments  "must be done in-person in clinic  Some insurances will not cover the cost of virtual appointments. Please check with your insurance to find out if these visits are covered    COMMUNICATING URGENT AND NON-URGENT MATTERS:  Your concerns are important and deserve to be heard and addressed. If you have an urgent matter, there are two methods that will ensure your concerns are prioritized appropriately:   Preferred method: Sign-up/Login to your [employer] account and send a message addressed to Dr. Brumfield or Nicole Malin (Dr. Brumfield's assistant). In the subject line, type \"urgent\" followed by a word or phrase describing the situation (For example, write \"Urgent: Out of antiseuzre med and need refill\" or \"Urgent: Severe side effects to new meds\". In doing this, our staff can ensure urgent messages are triaged appropriately and communicated to Dr. Brumfield that day.  Call [facility] main line at [phone]. Dr. Brumfield's voicemail extension is [phone]. When leaving a voice message, specifically indicate if it is urgent (or non-urgent) so that the matter can be triaged appropriately and addressed in a timely manner    Thank you for entrusting your neurological care to [facility] and we look forward to continuing to serve you.   "
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
## 2025-07-07 DIAGNOSIS — F31.81 BIPOLAR 2 DISORDER (HCC): ICD-10-CM

## 2025-07-07 RX ORDER — CLONIDINE HYDROCHLORIDE 0.1 MG/1
0.1 TABLET ORAL
Qty: 30 TABLET | Refills: 1 | Status: SHIPPED | OUTPATIENT
Start: 2025-07-07 | End: 2025-07-29

## 2025-07-08 ENCOUNTER — PHARMACY VISIT (OUTPATIENT)
Dept: PHARMACY | Facility: MEDICAL CENTER | Age: 50
End: 2025-07-08
Payer: COMMERCIAL

## 2025-07-08 ENCOUNTER — HOSPITAL ENCOUNTER (EMERGENCY)
Facility: MEDICAL CENTER | Age: 50
End: 2025-07-08
Attending: EMERGENCY MEDICINE
Payer: COMMERCIAL

## 2025-07-08 ENCOUNTER — APPOINTMENT (OUTPATIENT)
Dept: RADIOLOGY | Facility: MEDICAL CENTER | Age: 50
End: 2025-07-08
Attending: EMERGENCY MEDICINE
Payer: COMMERCIAL

## 2025-07-08 VITALS
DIASTOLIC BLOOD PRESSURE: 78 MMHG | HEIGHT: 64 IN | WEIGHT: 158.07 LBS | OXYGEN SATURATION: 96 % | SYSTOLIC BLOOD PRESSURE: 122 MMHG | TEMPERATURE: 98 F | HEART RATE: 114 BPM | RESPIRATION RATE: 18 BRPM | BODY MASS INDEX: 26.99 KG/M2

## 2025-07-08 DIAGNOSIS — R10.9 FLANK PAIN: Primary | ICD-10-CM

## 2025-07-08 DIAGNOSIS — R51.9 ACUTE NONINTRACTABLE HEADACHE, UNSPECIFIED HEADACHE TYPE: ICD-10-CM

## 2025-07-08 DIAGNOSIS — N12 PYELONEPHRITIS: ICD-10-CM

## 2025-07-08 DIAGNOSIS — R74.01 TRANSAMINITIS: ICD-10-CM

## 2025-07-08 LAB
ALBUMIN SERPL BCP-MCNC: 4.4 G/DL (ref 3.2–4.9)
ALBUMIN/GLOB SERPL: 1.4 G/DL
ALP SERPL-CCNC: 167 U/L (ref 30–99)
ALT SERPL-CCNC: 144 U/L (ref 2–50)
ANION GAP SERPL CALC-SCNC: 13 MMOL/L (ref 7–16)
APPEARANCE UR: CLEAR
AST SERPL-CCNC: 123 U/L (ref 12–45)
BACTERIA #/AREA URNS HPF: ABNORMAL /HPF
BASOPHILS # BLD AUTO: 0.3 % (ref 0–1.8)
BASOPHILS # BLD: 0.04 K/UL (ref 0–0.12)
BILIRUB SERPL-MCNC: 0.9 MG/DL (ref 0.1–1.5)
BILIRUB UR QL STRIP.AUTO: NEGATIVE
BUN SERPL-MCNC: 10 MG/DL (ref 8–22)
CALCIUM ALBUM COR SERPL-MCNC: 9.7 MG/DL (ref 8.5–10.5)
CALCIUM SERPL-MCNC: 10 MG/DL (ref 8.5–10.5)
CASTS URNS QL MICRO: ABNORMAL /LPF (ref 0–2)
CHLORIDE SERPL-SCNC: 93 MMOL/L (ref 96–112)
CO2 SERPL-SCNC: 25 MMOL/L (ref 20–33)
COLOR UR: YELLOW
CREAT SERPL-MCNC: 1.02 MG/DL (ref 0.5–1.4)
EOSINOPHIL # BLD AUTO: 0 K/UL (ref 0–0.51)
EOSINOPHIL NFR BLD: 0 % (ref 0–6.9)
EPITHELIAL CELLS 1715: ABNORMAL /HPF (ref 0–5)
ERYTHROCYTE [DISTWIDTH] IN BLOOD BY AUTOMATED COUNT: 40 FL (ref 35.9–50)
GFR SERPLBLD CREATININE-BSD FMLA CKD-EPI: 66 ML/MIN/1.73 M 2
GLOBULIN SER CALC-MCNC: 3.1 G/DL (ref 1.9–3.5)
GLUCOSE SERPL-MCNC: 118 MG/DL (ref 65–99)
GLUCOSE UR STRIP.AUTO-MCNC: NEGATIVE MG/DL
HCT VFR BLD AUTO: 44.9 % (ref 37–47)
HGB BLD-MCNC: 15.6 G/DL (ref 12–16)
IMM GRANULOCYTES # BLD AUTO: 0.05 K/UL (ref 0–0.11)
IMM GRANULOCYTES NFR BLD AUTO: 0.4 % (ref 0–0.9)
KETONES UR STRIP.AUTO-MCNC: NEGATIVE MG/DL
LEUKOCYTE ESTERASE UR QL STRIP.AUTO: ABNORMAL
LIPASE SERPL-CCNC: 22 U/L (ref 11–82)
LYMPHOCYTES # BLD AUTO: 0.59 K/UL (ref 1–4.8)
LYMPHOCYTES NFR BLD: 4.6 % (ref 22–41)
MCH RBC QN AUTO: 31.6 PG (ref 27–33)
MCHC RBC AUTO-ENTMCNC: 34.7 G/DL (ref 32.2–35.5)
MCV RBC AUTO: 90.9 FL (ref 81.4–97.8)
MICRO URNS: ABNORMAL
MONOCYTES # BLD AUTO: 1.77 K/UL (ref 0–0.85)
MONOCYTES NFR BLD AUTO: 13.7 % (ref 0–13.4)
NEUTROPHILS # BLD AUTO: 10.45 K/UL (ref 1.82–7.42)
NEUTROPHILS NFR BLD: 81 % (ref 44–72)
NITRITE UR QL STRIP.AUTO: NEGATIVE
NRBC # BLD AUTO: 0 K/UL
NRBC BLD-RTO: 0 /100 WBC (ref 0–0.2)
PH UR STRIP.AUTO: 8 [PH] (ref 5–8)
PLATELET # BLD AUTO: 294 K/UL (ref 164–446)
PMV BLD AUTO: 8.9 FL (ref 9–12.9)
POTASSIUM SERPL-SCNC: 3.4 MMOL/L (ref 3.6–5.5)
PROT SERPL-MCNC: 7.5 G/DL (ref 6–8.2)
PROT UR QL STRIP: NEGATIVE MG/DL
RBC # BLD AUTO: 4.94 M/UL (ref 4.2–5.4)
RBC # URNS HPF: ABNORMAL /HPF (ref 0–2)
RBC UR QL AUTO: ABNORMAL
SODIUM SERPL-SCNC: 131 MMOL/L (ref 135–145)
SP GR UR STRIP.AUTO: 1
UROBILINOGEN UR STRIP.AUTO-MCNC: 1 EU/DL
WBC # BLD AUTO: 12.9 K/UL (ref 4.8–10.8)
WBC #/AREA URNS HPF: >100 /HPF

## 2025-07-08 PROCEDURE — 80053 COMPREHEN METABOLIC PANEL: CPT

## 2025-07-08 PROCEDURE — A9270 NON-COVERED ITEM OR SERVICE: HCPCS | Performed by: EMERGENCY MEDICINE

## 2025-07-08 PROCEDURE — 36415 COLL VENOUS BLD VENIPUNCTURE: CPT

## 2025-07-08 PROCEDURE — 74176 CT ABD & PELVIS W/O CONTRAST: CPT

## 2025-07-08 PROCEDURE — RXMED WILLOW AMBULATORY MEDICATION CHARGE: Performed by: EMERGENCY MEDICINE

## 2025-07-08 PROCEDURE — 99284 EMERGENCY DEPT VISIT MOD MDM: CPT

## 2025-07-08 PROCEDURE — 96372 THER/PROPH/DIAG INJ SC/IM: CPT

## 2025-07-08 PROCEDURE — 83690 ASSAY OF LIPASE: CPT

## 2025-07-08 PROCEDURE — 85025 COMPLETE CBC W/AUTO DIFF WBC: CPT

## 2025-07-08 PROCEDURE — 700111 HCHG RX REV CODE 636 W/ 250 OVERRIDE (IP): Performed by: EMERGENCY MEDICINE

## 2025-07-08 PROCEDURE — 700102 HCHG RX REV CODE 250 W/ 637 OVERRIDE(OP): Performed by: EMERGENCY MEDICINE

## 2025-07-08 PROCEDURE — 81001 URINALYSIS AUTO W/SCOPE: CPT

## 2025-07-08 RX ORDER — SULFAMETHOXAZOLE AND TRIMETHOPRIM 800; 160 MG/1; MG/1
1 TABLET ORAL 2 TIMES DAILY
Qty: 14 TABLET | Refills: 0 | Status: ACTIVE | OUTPATIENT
Start: 2025-07-08 | End: 2025-07-15

## 2025-07-08 RX ORDER — CEFTRIAXONE 1 G/1
1000 INJECTION, POWDER, FOR SOLUTION INTRAMUSCULAR; INTRAVENOUS ONCE
Status: COMPLETED | OUTPATIENT
Start: 2025-07-08 | End: 2025-07-08

## 2025-07-08 RX ORDER — ONDANSETRON 4 MG/1
4 TABLET, ORALLY DISINTEGRATING ORAL EVERY 8 HOURS PRN
Qty: 10 TABLET | Refills: 0 | Status: SHIPPED | OUTPATIENT
Start: 2025-07-08

## 2025-07-08 RX ORDER — OXYCODONE AND ACETAMINOPHEN 5; 325 MG/1; MG/1
1 TABLET ORAL ONCE
Refills: 0 | Status: COMPLETED | OUTPATIENT
Start: 2025-07-08 | End: 2025-07-08

## 2025-07-08 RX ORDER — ONDANSETRON 4 MG/1
4 TABLET, ORALLY DISINTEGRATING ORAL ONCE
Status: COMPLETED | OUTPATIENT
Start: 2025-07-08 | End: 2025-07-08

## 2025-07-08 RX ADMIN — OXYCODONE AND ACETAMINOPHEN 1 TABLET: 5; 325 TABLET ORAL at 16:32

## 2025-07-08 RX ADMIN — ONDANSETRON 4 MG: 4 TABLET, ORALLY DISINTEGRATING ORAL at 16:33

## 2025-07-08 RX ADMIN — CEFTRIAXONE SODIUM 1000 MG: 1 INJECTION, POWDER, FOR SOLUTION INTRAMUSCULAR; INTRAVENOUS at 16:34

## 2025-07-08 ASSESSMENT — PAIN DESCRIPTION - PAIN TYPE
TYPE: ACUTE PAIN
TYPE: ACUTE PAIN

## 2025-07-08 NOTE — ED TRIAGE NOTES
Chief Complaint   Patient presents with    Flank Pain     L sided flank pain radiating to the lower back x 1 day, hx of kidney stones     Migraine     Pt ambulatory to triage for above. A+O x 4, GCS 15, answering questions appropriately     Abd protocol ordered

## 2025-07-08 NOTE — DISCHARGE PLANNING
TCN following. HTH/SCP chart review completed. Note pt currently in ED 2' to flank pain radiating to lower back X 1 day and migraine.      Per chart review, note patient has a [facility] PCP.  She does not have a PCP f/u appointment scheduled.  Will refer to  if indicated by ERP at discharge.  Per review, she is ambulatory to triage and does not have need for O2 at this time. Based on current review, it is anticipated that pt will dc to home with close OP f/u (either directly from ED or after admission to [facility] if warranted).     If patient does not warrant admission/ inpatient status to [facility] and is unable to functionally discharge home, please reach out to TCN for assist with SCP auth to discharge directly from ED to skilled.     If patient admits to [facility], please reach out to TCN via VOALTE if post acute transitional care needs are warranted for dc planning. Thank you.      Addendum:  1649- Per chart review, patient ambulatory and discharged home.  Referred to  for PCP f/u:   Hospital Follow Up with Nurse Practitioner CARRIE Ramsey on Thursday July 10 12:45 PM.

## 2025-07-08 NOTE — ED PROVIDER NOTES
ER Provider Note    Scribed for Bill Tao M.D. by David Callahan. 7/8/2025   3:54 PM    Primary Care Provider: CARRIE Whiting    CHIEF COMPLAINT  Chief Complaint   Patient presents with    Flank Pain     L sided flank pain radiating to the lower back x 1 day, hx of kidney stones     Migraine     EXTERNAL RECORDS REVIEWED  Other Patient has history of kidney stones and chronic kidney disease.    HPI/ROS  LIMITATION TO HISTORY   Select: : None  OUTSIDE HISTORIAN(S):  None    Earnestine Lindsay is a 50 y.o. female with history of kidney stones and chronic kidney disease who presents to the ED for evaluation of flank pain onset 1 day ago. Patient states that the pain is located on her left side, which occasionally radiates to her lower back. Patient also mentions associated migraine. No alleviating or exacerbating factors noted.    PAST MEDICAL HISTORY  Past Medical History[1]    SURGICAL HISTORY  Past Surgical History[2]    FAMILY HISTORY  Family History   Problem Relation Age of Onset    Kidney stones Brother     Stroke Mother     Diabetes Father     Hypertension Father     Hyperlipidemia Father     Kidney Disease Neg Hx        SOCIAL HISTORY   reports that she quit smoking about 15 years ago. Her smoking use included cigarettes. She started smoking about 34 years ago. She has a 37.3 pack-year smoking history. She has never used smokeless tobacco. She reports that she does not currently use alcohol. She reports current drug use. Drugs: Marijuana, Inhaled, and Oral.    CURRENT MEDICATIONS  Previous Medications    ALBUTEROL 108 (90 BASE) MCG/ACT AERO SOLN INHALATION AEROSOL    Inhale 2 Puffs every four hours as needed for Shortness of Breath.    ALPRAZOLAM (XANAX) 0.25 MG TAB    Take 1 Tablet by mouth at bedtime as needed for Anxiety or Sleep for up to 180 days. Take 0.25 mg by mouth at bedtime as needed for Anxiety or Sleep.    CLONIDINE (CATAPRES) 0.1 MG TAB    Take 1 Tablet by mouth at bedtime.     "DESMOPRESSIN (DDAVP) 0.2 MG TABLET    Take 1 Tablet by mouth 2 times a day.    ESZOPICLONE (LUNESTA) 1 MG TAB TABLET    Take 1 Tablet by mouth at bedtime as needed for Insomnia for up to 90 days.    FREMANEZUMAB-VFRM (AJOVY) 225 MG/1.5ML SOLUTION AUTO-INJECTOR    Inject 225 mg under the skin every 30 DAYS for 360 days.    HYDROCHLOROTHIAZIDE 25 MG TAB    TAKE 1 TABLET BY MOUTH TWICE A DAY    LAMOTRIGINE (LAMICTAL) 200 MG TABLET    Take 1 Tablet by mouth 2 times a day for 360 days.    LEVOTHYROXINE (SYNTHROID) 100 MCG TAB    Take 100 mcg by mouth every morning on an empty stomach.    LIOTHYRONINE (CYTOMEL) 5 MCG TAB    Take 10 mcg by mouth every day. Take 1 tablet by mouth every day on an empty stomach.    METFORMIN ER (GLUCOPHAGE XR) 500 MG TABLET SR 24 HR    Take 2 Tablets by mouth every day for 90 days.    ONDANSETRON (ZOFRAN ODT) 8 MG TABLET DISPERSIBLE    Take 1 Tablet by mouth every 8 hours as needed (nausea and/or vomiting.) for up to 180 days.    PHENTERMINE (ADIPEX-P) 37.5 MG TABLET    Take 37.5 mg by mouth every day.    POTASSIUM CITRATE 15 MEQ (1620 MG) TAB CR    TAKE 2 TABLETS BY MOUTH TWICE A DAY    PROGESTERONE (PROMETRIUM) 100 MG CAP    Take 100 mg by mouth every day.    RISPERIDONE (RISPERDAL) 2 MG TAB    Take 1 Tablet by mouth every evening for 180 days.    SUMATRIPTAN (IMITREX) 100 MG TABLET    Take 1 Tablet by mouth one time as needed for Migraine for up to 180 days.    TESTOSTERONE CYPIONATE (DEPO-TESTOSTERONE) 200 MG/ML SOLUTION INJECTION    Inject 0.25 mL into the shoulder, thigh, or buttocks every 7 days.       ALLERGIES  Allergies[3]     PHYSICAL EXAM  BP (!) 142/87   Pulse (!) 109   Temp 36.3 °C (97.4 °F) (Temporal)   Resp 16   Ht 1.626 m (5' 4\")   Wt 71.7 kg (158 lb 1.1 oz)   SpO2 94%   BMI 27.13 kg/m²    Nursing note and vitals reviewed.  Constitutional: Well-developed and well-nourished. No distress.   HENT: Head is normocephalic and atraumatic. Oropharynx is clear and moist without " exudate or erythema.   Eyes: Pupils are equal, round, and reactive to light. Conjunctiva are normal.   Cardiovascular: Normal rate and regular rhythm. No murmur heard. Normal radial pulses.  Pulmonary/Chest: Breath sounds normal. No wheezes or rales.   Abdominal: Soft and non-tender. No distention    Musculoskeletal: Extremities exhibit normal range of motion without edema or tenderness.   Back: Bilateral CVA tenderness.  Neurological: Awake, alert and oriented to person, place, and time. No focal deficits noted.  Skin: Skin is warm and dry. No rash.   Psychiatric: Normal mood and affect. Appropriate for clinical situation    DIAGNOSTIC STUDIES    Labs:   Results for orders placed or performed during the hospital encounter of 07/08/25   CBC with Differential    Collection Time: 07/08/25  2:39 PM   Result Value Ref Range    WBC 12.9 (H) 4.8 - 10.8 K/uL    RBC 4.94 4.20 - 5.40 M/uL    Hemoglobin 15.6 12.0 - 16.0 g/dL    Hematocrit 44.9 37.0 - 47.0 %    MCV 90.9 81.4 - 97.8 fL    MCH 31.6 27.0 - 33.0 pg    MCHC 34.7 32.2 - 35.5 g/dL    RDW 40.0 35.9 - 50.0 fL    Platelet Count 294 164 - 446 K/uL    MPV 8.9 (L) 9.0 - 12.9 fL    Neutrophils-Polys 81.00 (H) 44.00 - 72.00 %    Lymphocytes 4.60 (L) 22.00 - 41.00 %    Monocytes 13.70 (H) 0.00 - 13.40 %    Eosinophils 0.00 0.00 - 6.90 %    Basophils 0.30 0.00 - 1.80 %    Immature Granulocytes 0.40 0.00 - 0.90 %    Nucleated RBC 0.00 0.00 - 0.20 /100 WBC    Neutrophils (Absolute) 10.45 (H) 1.82 - 7.42 K/uL    Lymphs (Absolute) 0.59 (L) 1.00 - 4.80 K/uL    Monos (Absolute) 1.77 (H) 0.00 - 0.85 K/uL    Eos (Absolute) 0.00 0.00 - 0.51 K/uL    Baso (Absolute) 0.04 0.00 - 0.12 K/uL    Immature Granulocytes (abs) 0.05 0.00 - 0.11 K/uL    NRBC (Absolute) 0.00 K/uL   Complete Metabolic Panel    Collection Time: 07/08/25  2:39 PM   Result Value Ref Range    Sodium 131 (L) 135 - 145 mmol/L    Potassium 3.4 (L) 3.6 - 5.5 mmol/L    Chloride 93 (L) 96 - 112 mmol/L    Co2 25 20 - 33 mmol/L     Anion Gap 13.0 7.0 - 16.0    Glucose 118 (H) 65 - 99 mg/dL    Bun 10 8 - 22 mg/dL    Creatinine 1.02 0.50 - 1.40 mg/dL    Calcium 10.0 8.5 - 10.5 mg/dL    Correct Calcium 9.7 8.5 - 10.5 mg/dL    AST(SGOT) 123 (H) 12 - 45 U/L    ALT(SGPT) 144 (H) 2 - 50 U/L    Alkaline Phosphatase 167 (H) 30 - 99 U/L    Total Bilirubin 0.9 0.1 - 1.5 mg/dL    Albumin 4.4 3.2 - 4.9 g/dL    Total Protein 7.5 6.0 - 8.2 g/dL    Globulin 3.1 1.9 - 3.5 g/dL    A-G Ratio 1.4 g/dL   Lipase    Collection Time: 07/08/25  2:39 PM   Result Value Ref Range    Lipase 22 11 - 82 U/L   ESTIMATED GFR    Collection Time: 07/08/25  2:39 PM   Result Value Ref Range    GFR (CKD-EPI) 66 >60 mL/min/1.73 m 2   Urinalysis    Collection Time: 07/08/25  2:46 PM    Specimen: Urine   Result Value Ref Range    Color Yellow     Character Clear     Specific Gravity 1.005 <1.035    Ph 8.0 5.0 - 8.0    Glucose Negative Negative mg/dL    Ketones Negative Negative mg/dL    Protein Negative Negative mg/dL    Bilirubin Negative Negative    Urobilinogen, Urine 1.0 <=1.0 EU/dL    Nitrite Negative Negative    Leukocyte Esterase Large (A) Negative    Occult Blood Trace (A) Negative    Micro Urine Req Microscopic    URINE MICROSCOPIC (W/UA)    Collection Time: 07/08/25  2:46 PM   Result Value Ref Range    WBC >100 (A) /hpf    RBC 0-2 0 - 2 /hpf    Bacteria Many (A) None /hpf    Epithelial Cells 3-5 0 - 5 /hpf    Urine Casts 0-2 0 - 2 /lpf       Radiology:   This attending emergency physician has independently interpreted the diagnostic imaging associated with this visit and is awaiting the final reading from the radiologist.   Preliminary interpretation is a follows: CT scan does not demonstrate any evidence of ureterolithiasis    Radiologist interpretation:   CT-RENAL COLIC EVALUATION(A/P W/O)   Final Result      1.  Nonobstructing bilateral renal stones. No hydronephrosis.   2.  No acute inflammatory process in the abdomen or pelvis.   3.  Normal appendix.   4.  Stable  postsurgical changes of hysterectomy.           ASSESSMENT AND PLAN    3:54 PM - Patient was evaluated at bedside. Ordered for UA, Lipase, CMP, CBC w/ Diff, Urine Microscopic, and CT-Renal-Colic w/o to evaluate. Patient verbalizes understanding and support with my plan of care.  Differential diagnoses include but not limited to: UTI, kidney stones, musculoskeletal pain     4:10 PM - Lab studies today show urinalysis consistent with urinary tract infection, elevated liver function tests, and elevated white blood cell count.  Patient does not have any right upper quadrant pain.  I will have the patient follow-up as an outpatient for recheck of her liver function test.  Today workup is consistent with urinary tract infection.    4:22 PM - CT-imaging indicates no ureterolithiasis, though she does have pyelonephritis. Discussed discharge instructions and return precautions with the patient and they were cleared for discharge. Patient was given the opportunity to ask any further questions. Patient is comfortable with discharge at this time.      DISPOSITION AND DISCUSSIONS    I have discussed management of the patient with the following physicians and PATRICAI's:  None    Discussion of management with other [facility] or appropriate source(s): None     The patient will return for new or worsening symptoms and is stable at the time of discharge.    DISPOSITION:  Patient will be discharged home in stable condition.    FOLLOW UP:  LANA WhitingRKennethN.  [address] [place] [address] [phone]    Schedule an appointment as soon as possible for a visit   have your liver function tests rechecked and review all test results from today.    [facility], Emergency Dept  [address] [id number] [phone]    If symptoms worsen      OUTPATIENT MEDICATIONS:  New Prescriptions    ONDANSETRON (ZOFRAN ODT) 4 MG TABLET DISPERSIBLE    Take 1 Tablet by mouth every 8 hours as needed for  Nausea/Vomiting.    SULFAMETHOXAZOLE-TRIMETHOPRIM (BACTRIM DS) 800-160 MG TABLET    Take 1 Tablet by mouth 2 times a day for 7 days.         FINAL DIAGNOSIS  1. Flank pain    2. Acute nonintractable headache, unspecified headache type    3. Pyelonephritis         David JUAREZ ([employer]), am scribing for, and in the presence of, Bill Tao M.D..    Electronically signed by: David Callahan ([employer]), 7/8/2025    Bill JUAREZ M.D. personally performed the services described in this documentation, as scribed by David Callahan in my presence, and it is both accurate and complete.      The note accurately reflects work and decisions made by me.  Bill Tao M.D.  7/8/2025  4:58 PM          [1]   Past Medical History:  Diagnosis Date    Anxiety     Asthma     inhalers    Bipolar 2 disorder (HCC)     depression , anxiety    Bipolar disorder (HCC) 11/11/2020    Cancer ([facility]) 1996    cervical    Depression     Hypothyroidism 11/11/2020    Migraine     Nephrolithiasis 11/11/2020    Pain     back    Seizure (HCC) 2013    takes po meds    Stroke ([facility]) 2013    TIA    Urinary incontinence 11/11/2020   [2]   Past Surgical History:  Procedure Laterality Date    OK CYSTOSCOPY,INSERT URETERAL STENT Right 12/09/2020    Procedure: CYSTOSCOPY, WITH URETERAL STENT INSERTION;  Surgeon: Dorian Estrada M.D.;  Location: SURGERY [facility];  Service: Urology    OK CYSTO/URETERO/PYELOSCOPY, DX Right 12/09/2020    Procedure: URETEROSCOPY;  Surgeon: Dorian Estrada M.D.;  Location: SURGERY [facility];  Service: Urology    CHOLECYSTECTOMY  2009    ABDOMINAL HYSTERECTOMY TOTAL  2002    EXPLORATORY LAPAROTOMY      HAND SURGERY      3 x right hand, 1x left    LITHOTRIPSY Right 2014 and 2015    kidney stone    LYSIS ADHESIONS GENERAL      OOPHORECTOMY  2003, 2004,    ovarian cysct removal     PRIMARY C SECTION     [3]   Allergies  Allergen Reactions    Promethazine Hives

## 2025-07-08 NOTE — ED NOTES
PT ambulated to PUR 72 with a steady gate. C/C is flank pain and headache. Pt is on the monitor, and call light is within reach. Chart up for ERP.

## 2025-07-09 PROCEDURE — RXMED WILLOW AMBULATORY MEDICATION CHARGE: Performed by: STUDENT IN AN ORGANIZED HEALTH CARE EDUCATION/TRAINING PROGRAM

## 2025-07-10 ENCOUNTER — OFFICE VISIT (OUTPATIENT)
Dept: MEDICAL GROUP | Facility: IMAGING CENTER | Age: 50
End: 2025-07-10
Payer: COMMERCIAL

## 2025-07-10 VITALS
TEMPERATURE: 98.4 F | OXYGEN SATURATION: 98 % | HEIGHT: 64 IN | SYSTOLIC BLOOD PRESSURE: 122 MMHG | HEART RATE: 107 BPM | DIASTOLIC BLOOD PRESSURE: 70 MMHG | BODY MASS INDEX: 27.18 KG/M2 | RESPIRATION RATE: 16 BRPM | WEIGHT: 159.2 LBS

## 2025-07-10 DIAGNOSIS — N12 PYELONEPHRITIS: Primary | ICD-10-CM

## 2025-07-10 DIAGNOSIS — R74.8 ELEVATED LIVER ENZYMES: ICD-10-CM

## 2025-07-10 PROCEDURE — 3074F SYST BP LT 130 MM HG: CPT

## 2025-07-10 PROCEDURE — 1125F AMNT PAIN NOTED PAIN PRSNT: CPT

## 2025-07-10 PROCEDURE — 99213 OFFICE O/P EST LOW 20 MIN: CPT

## 2025-07-10 PROCEDURE — 3078F DIAST BP <80 MM HG: CPT

## 2025-07-10 ASSESSMENT — PAIN SCALES - GENERAL: PAINLEVEL_OUTOF10: 5=MODERATE PAIN

## 2025-07-10 ASSESSMENT — FIBROSIS 4 INDEX: FIB4 SCORE: 1.81
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
## 2025-07-11 ENCOUNTER — PHARMACY VISIT (OUTPATIENT)
Dept: PHARMACY | Facility: MEDICAL CENTER | Age: 50
End: 2025-07-11
Payer: COMMERCIAL

## 2025-07-16 ENCOUNTER — TELEPHONE (OUTPATIENT)
Dept: MEDICAL GROUP | Facility: IMAGING CENTER | Age: 50
End: 2025-07-16
Payer: COMMERCIAL

## 2025-07-16 DIAGNOSIS — Z79.899 LONG TERM CURRENT USE OF ANTIPSYCHOTIC MEDICATION: ICD-10-CM

## 2025-07-16 DIAGNOSIS — Z79.899 HIGH RISK MEDICATION USE: ICD-10-CM

## 2025-07-16 RX ORDER — METFORMIN HYDROCHLORIDE 500 MG/1
1000 TABLET, EXTENDED RELEASE ORAL DAILY
Qty: 180 TABLET | Refills: 0 | Status: SHIPPED | OUTPATIENT
Start: 2025-07-16 | End: 2025-10-14

## 2025-07-16 NOTE — TELEPHONE ENCOUNTER
Lo Huerta this is a previous  pt req. refills on her Metformin ER 500mg RX. It looks like her pharmacy did not dispense the RX that was sent on 06/04/25 b/c the day supply was incorrect . Can you review/correct and resend so pt can  med today ? Thank you           Received request via: Patient    Was the patient seen in the last year in this department? Yes    Does the patient have an active prescription (recently filled or refills available) for medication(s) requested? No    Pharmacy Name: [id number]    Does the patient have [facility] and need 100-day supply? (This applies to ALL medications) Patient does not have S[facility]

## 2025-07-16 NOTE — TELEPHONE ENCOUNTER
VOICEMAIL  1. Caller Name: Earnestine Pete                      Call Back Number:     2. Message: Pt states she has appt for tomorrow and is experiencing painful burning sensation in urine. Pt states she has been taking azo and is wondering if this would skew the results of the urine test when she comes in office tomorrow    3. Patient approves office to leave a detailed voicemail/MyChart message: N\A

## 2025-07-17 ENCOUNTER — RESULTS FOLLOW-UP (OUTPATIENT)
Dept: MEDICAL GROUP | Facility: IMAGING CENTER | Age: 50
End: 2025-07-17

## 2025-07-17 ENCOUNTER — OFFICE VISIT (OUTPATIENT)
Dept: MEDICAL GROUP | Facility: IMAGING CENTER | Age: 50
End: 2025-07-17
Payer: COMMERCIAL

## 2025-07-17 VITALS
WEIGHT: 157.8 LBS | DIASTOLIC BLOOD PRESSURE: 66 MMHG | TEMPERATURE: 97.5 F | HEIGHT: 64 IN | OXYGEN SATURATION: 100 % | RESPIRATION RATE: 16 BRPM | HEART RATE: 100 BPM | SYSTOLIC BLOOD PRESSURE: 118 MMHG | BODY MASS INDEX: 26.94 KG/M2

## 2025-07-17 DIAGNOSIS — Z23 NEED FOR VACCINATION: ICD-10-CM

## 2025-07-17 DIAGNOSIS — Z12.31 ENCOUNTER FOR SCREENING MAMMOGRAM FOR MALIGNANT NEOPLASM OF BREAST: ICD-10-CM

## 2025-07-17 DIAGNOSIS — N12 PYELONEPHRITIS: Primary | ICD-10-CM

## 2025-07-17 LAB
APPEARANCE UR: NORMAL
BILIRUB UR STRIP-MCNC: NEGATIVE MG/DL
COLOR UR AUTO: YELLOW
GLUCOSE UR STRIP.AUTO-MCNC: NEGATIVE MG/DL
KETONES UR STRIP.AUTO-MCNC: NEGATIVE MG/DL
LEUKOCYTE ESTERASE UR QL STRIP.AUTO: NEGATIVE
NITRITE UR QL STRIP.AUTO: NEGATIVE
PH UR STRIP.AUTO: 7.5 [PH] (ref 5–8)
PROT UR QL STRIP: NEGATIVE MG/DL
RBC UR QL AUTO: NEGATIVE
SP GR UR STRIP.AUTO: 1.01
UROBILINOGEN UR STRIP-MCNC: 0.2 MG/DL

## 2025-07-17 PROCEDURE — 90715 TDAP VACCINE 7 YRS/> IM: CPT

## 2025-07-17 PROCEDURE — 81002 URINALYSIS NONAUTO W/O SCOPE: CPT

## 2025-07-17 PROCEDURE — 3078F DIAST BP <80 MM HG: CPT

## 2025-07-17 PROCEDURE — 90471 IMMUNIZATION ADMIN: CPT

## 2025-07-17 PROCEDURE — 1126F AMNT PAIN NOTED NONE PRSNT: CPT

## 2025-07-17 PROCEDURE — 3074F SYST BP LT 130 MM HG: CPT

## 2025-07-17 PROCEDURE — 99213 OFFICE O/P EST LOW 20 MIN: CPT | Mod: 25

## 2025-07-17 ASSESSMENT — FIBROSIS 4 INDEX: FIB4 SCORE: 1.81

## 2025-07-17 ASSESSMENT — PAIN SCALES - GENERAL: PAINLEVEL_OUTOF10: NO PAIN
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
## 2025-07-18 DIAGNOSIS — N20.0 NEPHROLITHIASIS: ICD-10-CM

## 2025-07-21 RX ORDER — POTASSIUM CITRATE 15 MEQ/1
2 TABLET, EXTENDED RELEASE ORAL 2 TIMES DAILY
Qty: 360 TABLET | Refills: 3 | Status: SHIPPED | OUTPATIENT
Start: 2025-07-21

## 2025-07-22 ENCOUNTER — HOSPITAL ENCOUNTER (OUTPATIENT)
Dept: RADIOLOGY | Facility: MEDICAL CENTER | Age: 50
End: 2025-07-22
Payer: COMMERCIAL

## 2025-07-22 DIAGNOSIS — Z12.31 ENCOUNTER FOR SCREENING MAMMOGRAM FOR MALIGNANT NEOPLASM OF BREAST: ICD-10-CM

## 2025-07-22 PROCEDURE — 77063 BREAST TOMOSYNTHESIS BI: CPT

## 2025-07-23 DIAGNOSIS — R92.1 BREAST CALCIFICATIONS: ICD-10-CM

## 2025-07-23 DIAGNOSIS — N64.89 BREAST ASYMMETRY: Primary | ICD-10-CM

## 2025-07-23 NOTE — TELEPHONE ENCOUNTER
Received request via: Pharmacy    Medication Name/Dosage Emgality 120 mg/ml (sample)     When was medication last prescribed 12/29/2023    How many refills were previously provided 0 this was a sample     How many Refills does he patient have left from last prescription 0    Was the patient seen in the last year in this department? Yes   Date of last office visit 3/2/23     Per last Neurology Office Visit, when was the date of next follow up visit set for?                          11 mths  Date of office visit follow up request 2/2/2024     Does the patient have an upcoming appointment? Yes   If yes, when 2/23/24             If no, schedule appointment     Does the patient have [facility] Plus and need 100 day supply (blood pressure, diabetes and cholesterol meds only)? Medication is not for cholesterol, blood pressure or diabetes     Thank you for referring Meron Jeong to me for evaluation. Below are my notes for this visit with her.  If you have questions, please do not hesitate to call me. I look forward to following your patient along with you.  Sincerely,  Hannah Severino MD, FACP Gastroenterology and Liver [facility]

## 2025-07-24 NOTE — PROGRESS NOTES
Covering for PCP.  Diagnostic mammogram and breast ultrasound ordered to evaluate the abnormal findings on screening mammogram.

## 2025-07-29 DIAGNOSIS — F31.81 BIPOLAR 2 DISORDER (HCC): ICD-10-CM

## 2025-07-29 RX ORDER — CLONIDINE HYDROCHLORIDE 0.1 MG/1
0.1 TABLET ORAL
Qty: 90 TABLET | Refills: 0 | Status: SHIPPED | OUTPATIENT
Start: 2025-07-29

## 2025-08-06 DIAGNOSIS — F31.81 BIPOLAR 2 DISORDER (HCC): ICD-10-CM

## 2025-08-06 RX ORDER — ESZOPICLONE 1 MG/1
1 TABLET, FILM COATED ORAL
Qty: 30 TABLET | Refills: 2 | Status: SHIPPED | OUTPATIENT
Start: 2025-08-09 | End: 2025-09-08

## 2025-08-07 ENCOUNTER — HOSPITAL ENCOUNTER (OUTPATIENT)
Facility: MEDICAL CENTER | Age: 50
End: 2025-08-07
Payer: COMMERCIAL

## 2025-08-07 ENCOUNTER — RESULTS FOLLOW-UP (OUTPATIENT)
Dept: MEDICAL GROUP | Facility: IMAGING CENTER | Age: 50
End: 2025-08-07

## 2025-08-07 ENCOUNTER — APPOINTMENT (OUTPATIENT)
Dept: MEDICAL GROUP | Facility: IMAGING CENTER | Age: 50
End: 2025-08-07
Payer: COMMERCIAL

## 2025-08-07 DIAGNOSIS — R82.998 LEUKOCYTES IN URINE: ICD-10-CM

## 2025-08-07 DIAGNOSIS — N30.01 ACUTE CYSTITIS WITH HEMATURIA: ICD-10-CM

## 2025-08-07 PROBLEM — E28.319 PREMATURE MENOPAUSE ON HRT: Status: ACTIVE | Noted: 2025-08-07

## 2025-08-07 PROBLEM — Z79.890 PREMATURE MENOPAUSE ON HRT: Status: ACTIVE | Noted: 2025-08-07

## 2025-08-07 PROCEDURE — 87086 URINE CULTURE/COLONY COUNT: CPT

## 2025-08-07 PROCEDURE — 87077 CULTURE AEROBIC IDENTIFY: CPT

## 2025-08-07 PROCEDURE — 87186 SC STD MICRODIL/AGAR DIL: CPT

## 2025-08-07 ASSESSMENT — PAIN SCALES - GENERAL: PAINLEVEL_OUTOF10: 7=MODERATE-SEVERE PAIN

## 2025-08-07 ASSESSMENT — FIBROSIS 4 INDEX: FIB4 SCORE: 1.81

## 2025-08-08 ENCOUNTER — SPECIALTY PHARMACY (OUTPATIENT)
Dept: PHARMACY | Facility: MEDICAL CENTER | Age: 50
End: 2025-08-08
Payer: COMMERCIAL

## 2025-08-11 ENCOUNTER — SPECIALTY PHARMACY (OUTPATIENT)
Dept: PHARMACY | Facility: MEDICAL CENTER | Age: 50
End: 2025-08-11
Payer: COMMERCIAL

## 2025-08-11 DIAGNOSIS — N39.0 COMPLICATED UTI (URINARY TRACT INFECTION): ICD-10-CM

## 2025-08-11 DIAGNOSIS — G40.909 SEIZURE DISORDER (HCC): ICD-10-CM

## 2025-08-11 DIAGNOSIS — N39.0 RECURRENT UTI: Primary | ICD-10-CM

## 2025-08-11 DIAGNOSIS — F41.1 GENERALIZED ANXIETY DISORDER: ICD-10-CM

## 2025-08-11 DIAGNOSIS — F51.01 PRIMARY INSOMNIA: ICD-10-CM

## 2025-08-11 DIAGNOSIS — Z87.448 HISTORY OF PYELONEPHRITIS: ICD-10-CM

## 2025-08-11 PROCEDURE — RXMED WILLOW AMBULATORY MEDICATION CHARGE: Performed by: STUDENT IN AN ORGANIZED HEALTH CARE EDUCATION/TRAINING PROGRAM

## 2025-08-11 RX ORDER — SULFAMETHOXAZOLE AND TRIMETHOPRIM 800; 160 MG/1; MG/1
1 TABLET ORAL 2 TIMES DAILY
Qty: 20 TABLET | Refills: 0 | Status: SHIPPED | OUTPATIENT
Start: 2025-08-11 | End: 2025-08-21

## 2025-08-12 ENCOUNTER — PHARMACY VISIT (OUTPATIENT)
Dept: PHARMACY | Facility: MEDICAL CENTER | Age: 50
End: 2025-08-12
Payer: COMMERCIAL

## 2025-08-13 RX ORDER — ALPRAZOLAM 0.25 MG
0.25 TABLET ORAL NIGHTLY PRN
Qty: 90 TABLET | Refills: 1 | Status: SHIPPED | OUTPATIENT
Start: 2025-08-13 | End: 2026-02-09

## 2025-08-21 ENCOUNTER — RESULTS FOLLOW-UP (OUTPATIENT)
Dept: MEDICAL GROUP | Facility: IMAGING CENTER | Age: 50
End: 2025-08-21

## 2025-08-21 ENCOUNTER — HOSPITAL ENCOUNTER (OUTPATIENT)
Facility: MEDICAL CENTER | Age: 50
End: 2025-08-21
Attending: STUDENT IN AN ORGANIZED HEALTH CARE EDUCATION/TRAINING PROGRAM
Payer: COMMERCIAL

## 2025-08-21 DIAGNOSIS — R92.1 BREAST CALCIFICATIONS: ICD-10-CM

## 2025-08-21 DIAGNOSIS — N64.89 BREAST ASYMMETRY: ICD-10-CM

## 2025-08-21 PROCEDURE — G0279 TOMOSYNTHESIS, MAMMO: HCPCS

## (undated) DEVICE — SET LEADWIRE 5 LEAD BEDSIDE DISPOSABLE ECG (1SET OF 5/EA)

## (undated) DEVICE — BAG URODRAIN WITH TUBING - (20/CA)

## (undated) DEVICE — SLEEVE, VASO, THIGH, MED

## (undated) DEVICE — CATHETER URETHRAL OPEN END AXXCESS (10EA/BX)

## (undated) DEVICE — MASK ANESTHESIA ADULT  - (100/CA)

## (undated) DEVICE — GOWN WARMING STANDARD FLEX - (30/CA)

## (undated) DEVICE — HEAD HOLDER JUNIOR/ADULT

## (undated) DEVICE — WIRE GUIDE SENSOR DUAL FLEX - 5/BX

## (undated) DEVICE — CATHETER URET OPEN END 6FR (10EA/BX)

## (undated) DEVICE — ELECTRODE 850 FOAM ADHESIVE - HYDROGEL RADIOTRNSPRNT (50/PK)

## (undated) DEVICE — NEPTUNE 4 PORT MANIFOLD - (20/PK)

## (undated) DEVICE — COVER FOOT UNIVERSAL DISP. - (25EA/CA)

## (undated) DEVICE — GLOVE BIOGEL SZ 7.5 SURGICAL PF LTX - (50PR/BX 4BX/CA)

## (undated) DEVICE — GOWN SURGICAL X-LARGE ULTRA - FILM-REINFORCED (20/CA)

## (undated) DEVICE — SET IRRIGATION CYSTOSCOPY Y-TYPE L81 IN (20EA/CA)

## (undated) DEVICE — KIT ANESTHESIA W/CIRCUIT & 3/LT BAG W/FILTER (20EA/CA)

## (undated) DEVICE — SUCTION INSTRUMENT YANKAUER BULBOUS TIP W/O VENT (50EA/CA)

## (undated) DEVICE — CONNECTOR HOSE NEPTUNE FOR CYSTO ROOM

## (undated) DEVICE — WATER IRRIG. STER 3000 ML - (4/CA)

## (undated) DEVICE — LACTATED RINGERS INJ 1000 ML - (14EA/CA 60CA/PF)

## (undated) DEVICE — GOWN SURGEONS X-LARGE - DISP. (30/CA)

## (undated) DEVICE — JELLY SURGILUBE STERILE TUBE 4.25 OZ (1/EA)

## (undated) DEVICE — SODIUM CHL. IRRIGATION 0.9% 3000ML (4EA/CA 65CA/PF)

## (undated) DEVICE — SET EXTENSION WITH 2 PORTS (48EA/CA) ***PART #2C8610 IS A SUBSTITUTE*****

## (undated) DEVICE — PACK CYSTOSCOPY III - (8/CA)

## (undated) DEVICE — TUBE CONNECT SUCTION CLEAR 120 X 1/4" (50EA/CA)"

## (undated) DEVICE — WATER IRRIGATION STERILE 1000ML (12EA/CA)

## (undated) DEVICE — PROTECTOR ULNA NERVE - (36PR/CA)

## (undated) DEVICE — SENSOR SPO2 NEO LNCS ADHESIVE (20/BX) SEE USER NOTES

## (undated) DEVICE — SPONGE GAUZESTER 4 X 4 4PLY - (128PK/CA)

## (undated) DEVICE — SCOPE DIGITAL URETEROSCOPE DISPOSABLE

## (undated) DEVICE — TUBING CLEARLINK DUO-VENT - C-FLO (48EA/CA)

## (undated) DEVICE — CATHETER URET DUAL LUMEN